# Patient Record
Sex: MALE | Race: WHITE | Employment: FULL TIME | URBAN - METROPOLITAN AREA
[De-identification: names, ages, dates, MRNs, and addresses within clinical notes are randomized per-mention and may not be internally consistent; named-entity substitution may affect disease eponyms.]

---

## 2017-02-28 RX ORDER — AMLODIPINE BESYLATE 10 MG/1
TABLET ORAL
Qty: 30 TABLET | Refills: 1 | Status: SHIPPED | OUTPATIENT
Start: 2017-02-28 | End: 2017-05-11 | Stop reason: SDUPTHER

## 2017-02-28 RX ORDER — LOSARTAN POTASSIUM 100 MG/1
TABLET ORAL
Qty: 30 TABLET | Refills: 1 | Status: SHIPPED | OUTPATIENT
Start: 2017-02-28 | End: 2017-05-11 | Stop reason: SDUPTHER

## 2017-02-28 RX ORDER — METOPROLOL SUCCINATE 50 MG/1
TABLET, EXTENDED RELEASE ORAL
Qty: 30 TABLET | Refills: 1 | Status: SHIPPED | OUTPATIENT
Start: 2017-02-28 | End: 2017-05-11 | Stop reason: SDUPTHER

## 2017-05-11 RX ORDER — METOPROLOL SUCCINATE 50 MG/1
TABLET, EXTENDED RELEASE ORAL
Qty: 30 TABLET | Refills: 0 | Status: SHIPPED | OUTPATIENT
Start: 2017-05-11 | End: 2017-05-19 | Stop reason: SDUPTHER

## 2017-05-11 RX ORDER — AMLODIPINE BESYLATE 10 MG/1
TABLET ORAL
Qty: 30 TABLET | Refills: 0 | Status: SHIPPED | OUTPATIENT
Start: 2017-05-11 | End: 2017-05-19 | Stop reason: SDUPTHER

## 2017-05-11 RX ORDER — LOSARTAN POTASSIUM 100 MG/1
TABLET ORAL
Qty: 30 TABLET | Refills: 0 | Status: SHIPPED | OUTPATIENT
Start: 2017-05-11 | End: 2017-05-19 | Stop reason: SDUPTHER

## 2017-05-19 ENCOUNTER — OFFICE VISIT (OUTPATIENT)
Dept: FAMILY MEDICINE CLINIC | Age: 57
End: 2017-05-19

## 2017-05-19 VITALS
RESPIRATION RATE: 16 BRPM | HEART RATE: 64 BPM | TEMPERATURE: 97.9 F | SYSTOLIC BLOOD PRESSURE: 124 MMHG | HEIGHT: 67 IN | WEIGHT: 185.6 LBS | DIASTOLIC BLOOD PRESSURE: 76 MMHG | BODY MASS INDEX: 29.13 KG/M2

## 2017-05-19 DIAGNOSIS — R60.0 LEG EDEMA, RIGHT: ICD-10-CM

## 2017-05-19 DIAGNOSIS — M79.604 RIGHT LEG PAIN: ICD-10-CM

## 2017-05-19 DIAGNOSIS — Z00.00 HEALTHCARE MAINTENANCE: Primary | ICD-10-CM

## 2017-05-19 DIAGNOSIS — I83.91 VARICOSE VEINS OF RIGHT LOWER EXTREMITY: ICD-10-CM

## 2017-05-19 DIAGNOSIS — E78.5 DYSLIPIDEMIA: ICD-10-CM

## 2017-05-19 DIAGNOSIS — I10 ESSENTIAL HYPERTENSION: ICD-10-CM

## 2017-05-19 PROCEDURE — 99396 PREV VISIT EST AGE 40-64: CPT | Performed by: FAMILY MEDICINE

## 2017-05-19 RX ORDER — METOPROLOL SUCCINATE 50 MG/1
TABLET, EXTENDED RELEASE ORAL
Qty: 30 TABLET | Refills: 5 | Status: SHIPPED | OUTPATIENT
Start: 2017-05-19 | End: 2017-12-24 | Stop reason: SDUPTHER

## 2017-05-19 RX ORDER — AMLODIPINE BESYLATE 10 MG/1
TABLET ORAL
Qty: 30 TABLET | Refills: 5 | Status: SHIPPED | OUTPATIENT
Start: 2017-05-19 | End: 2017-12-24 | Stop reason: SDUPTHER

## 2017-05-19 RX ORDER — LOSARTAN POTASSIUM 100 MG/1
TABLET ORAL
Qty: 30 TABLET | Refills: 5 | Status: SHIPPED | OUTPATIENT
Start: 2017-05-19 | End: 2017-12-24 | Stop reason: SDUPTHER

## 2017-05-22 DIAGNOSIS — M79.604 RIGHT LEG PAIN: ICD-10-CM

## 2017-05-22 DIAGNOSIS — R60.0 LEG EDEMA, RIGHT: ICD-10-CM

## 2017-06-09 DIAGNOSIS — I10 ESSENTIAL HYPERTENSION: ICD-10-CM

## 2017-06-09 DIAGNOSIS — E78.5 DYSLIPIDEMIA: ICD-10-CM

## 2017-06-09 LAB
ALBUMIN SERPL-MCNC: 4.3 G/DL (ref 3.9–4.9)
ALP BLD-CCNC: 86 U/L (ref 35–104)
ALT SERPL-CCNC: 24 U/L (ref 0–41)
ANION GAP SERPL CALCULATED.3IONS-SCNC: 14 MEQ/L (ref 7–13)
AST SERPL-CCNC: 22 U/L (ref 0–40)
BILIRUB SERPL-MCNC: 0.7 MG/DL (ref 0–1.2)
BUN BLDV-MCNC: 23 MG/DL (ref 6–20)
CALCIUM SERPL-MCNC: 9.1 MG/DL (ref 8.6–10.2)
CHLORIDE BLD-SCNC: 99 MEQ/L (ref 98–107)
CHOLESTEROL, TOTAL: 219 MG/DL (ref 0–199)
CO2: 26 MEQ/L (ref 22–29)
CREAT SERPL-MCNC: 0.74 MG/DL (ref 0.7–1.2)
GFR AFRICAN AMERICAN: >60
GFR NON-AFRICAN AMERICAN: >60
GLOBULIN: 2.7 G/DL (ref 2.3–3.5)
GLUCOSE BLD-MCNC: 83 MG/DL (ref 74–109)
HDLC SERPL-MCNC: 66 MG/DL (ref 40–59)
LDL CHOLESTEROL CALCULATED: 130 MG/DL (ref 0–129)
POTASSIUM SERPL-SCNC: 4.4 MEQ/L (ref 3.5–5.1)
SODIUM BLD-SCNC: 139 MEQ/L (ref 132–144)
TOTAL PROTEIN: 7 G/DL (ref 6.4–8.1)
TRIGL SERPL-MCNC: 117 MG/DL (ref 0–200)

## 2017-12-26 RX ORDER — AMLODIPINE BESYLATE 10 MG/1
TABLET ORAL
Qty: 30 TABLET | Refills: 0 | Status: SHIPPED | OUTPATIENT
Start: 2017-12-26 | End: 2018-01-23 | Stop reason: SDUPTHER

## 2017-12-26 RX ORDER — METOPROLOL SUCCINATE 50 MG/1
TABLET, EXTENDED RELEASE ORAL
Qty: 30 TABLET | Refills: 0 | Status: SHIPPED | OUTPATIENT
Start: 2017-12-26 | End: 2018-01-23 | Stop reason: SDUPTHER

## 2017-12-26 RX ORDER — LOSARTAN POTASSIUM 100 MG/1
TABLET ORAL
Qty: 30 TABLET | Refills: 0 | Status: SHIPPED | OUTPATIENT
Start: 2017-12-26 | End: 2018-01-23 | Stop reason: SDUPTHER

## 2017-12-26 NOTE — TELEPHONE ENCOUNTER
Treatment approved for 1 month   Called the patient. Needs follow up appointment.   Not seen in over 6 months

## 2017-12-26 NOTE — TELEPHONE ENCOUNTER
Pharmacy requests refill on medication. Please approve or deny this request.  Last seen by you on 5/19/2017. Pt needs follow up for HTN    No future appointments.

## 2018-01-21 RX ORDER — AMLODIPINE BESYLATE 10 MG/1
TABLET ORAL
Qty: 30 TABLET | OUTPATIENT
Start: 2018-01-21

## 2018-01-21 RX ORDER — METOPROLOL SUCCINATE 50 MG/1
TABLET, EXTENDED RELEASE ORAL
Qty: 30 TABLET | OUTPATIENT
Start: 2018-01-21

## 2018-01-21 RX ORDER — LOSARTAN POTASSIUM 100 MG/1
TABLET ORAL
Qty: 30 TABLET | OUTPATIENT
Start: 2018-01-21

## 2018-01-23 ENCOUNTER — OFFICE VISIT (OUTPATIENT)
Dept: FAMILY MEDICINE CLINIC | Age: 58
End: 2018-01-23
Payer: COMMERCIAL

## 2018-01-23 VITALS
HEIGHT: 67 IN | HEART RATE: 76 BPM | RESPIRATION RATE: 16 BRPM | DIASTOLIC BLOOD PRESSURE: 82 MMHG | TEMPERATURE: 98.7 F | WEIGHT: 192.4 LBS | BODY MASS INDEX: 30.2 KG/M2 | SYSTOLIC BLOOD PRESSURE: 122 MMHG

## 2018-01-23 DIAGNOSIS — I10 ESSENTIAL HYPERTENSION: Primary | ICD-10-CM

## 2018-01-23 DIAGNOSIS — E78.5 DYSLIPIDEMIA: ICD-10-CM

## 2018-01-23 PROCEDURE — 99213 OFFICE O/P EST LOW 20 MIN: CPT | Performed by: FAMILY MEDICINE

## 2018-01-23 RX ORDER — METOPROLOL SUCCINATE 50 MG/1
TABLET, EXTENDED RELEASE ORAL
Qty: 30 TABLET | Refills: 5 | Status: SHIPPED | OUTPATIENT
Start: 2018-01-23 | End: 2018-07-26 | Stop reason: SDUPTHER

## 2018-01-23 RX ORDER — LOSARTAN POTASSIUM 100 MG/1
TABLET ORAL
Qty: 30 TABLET | Refills: 5 | Status: SHIPPED | OUTPATIENT
Start: 2018-01-23 | End: 2018-07-26 | Stop reason: SDUPTHER

## 2018-01-23 RX ORDER — AMLODIPINE BESYLATE 10 MG/1
TABLET ORAL
Qty: 30 TABLET | Refills: 5 | Status: SHIPPED | OUTPATIENT
Start: 2018-01-23 | End: 2018-07-26 | Stop reason: SDUPTHER

## 2018-01-23 ASSESSMENT — PATIENT HEALTH QUESTIONNAIRE - PHQ9
2. FEELING DOWN, DEPRESSED OR HOPELESS: 0
SUM OF ALL RESPONSES TO PHQ9 QUESTIONS 1 & 2: 0
1. LITTLE INTEREST OR PLEASURE IN DOING THINGS: 0
SUM OF ALL RESPONSES TO PHQ QUESTIONS 1-9: 0

## 2018-01-23 NOTE — PROGRESS NOTES
palpable lymphadenopathy, no hepatosplenomegaly  Chest - clear to auscultation, no wheezes, rales or rhonchi, symmetric air entry  Heart - normal rate, regular rhythm, normal S1, S2, no murmurs, rubs, clicks or gallops  Abdomen - soft, nontender, nondistended, no masses or organomegaly  Neurological - alert, oriented, normal speech, no focal findings or movement disorder noted  Musculoskeletal - no joint tenderness, deformity or swelling. Assessment:     1. Essential hypertension  Comprehensive Metabolic Panel    Lipid Panel    CBC Auto Differential   2. Dyslipidemia            Plan:     Outpatient Encounter Prescriptions as of 1/23/2018   Medication Sig Dispense Refill    metoprolol succinate (TOPROL XL) 50 MG extended release tablet Take 1 tablet by mouth daily 30 tablet 5    amLODIPine (NORVASC) 10 MG tablet Take 1 tablet by mouth daily 30 tablet 5    losartan (COZAAR) 100 MG tablet Take 1 tablet by mouth daily 30 tablet 5    aspirin 81 MG tablet Take 81 mg by mouth daily      [DISCONTINUED] metoprolol succinate (TOPROL XL) 50 MG extended release tablet Take 1 tablet by mouth daily 30 tablet 0    [DISCONTINUED] losartan (COZAAR) 100 MG tablet Take 1 tablet by mouth daily 30 tablet 0    [DISCONTINUED] amLODIPine (NORVASC) 10 MG tablet Take 1 tablet by mouth daily 30 tablet 0     No facility-administered encounter medications on file as of 1/23/2018. Orders Placed This Encounter   Procedures    Comprehensive Metabolic Panel     Standing Status:   Future     Standing Expiration Date:   1/23/2019    Lipid Panel     Standing Status:   Future     Standing Expiration Date:   1/23/2019     Order Specific Question:   Is Patient Fasting?/# of Hours     Answer:   yes 9-12 hrs    CBC Auto Differential     Standing Status:   Future     Standing Expiration Date:   1/23/2019        Reviewed diet, exercise and weight control. Recommended sodium restriction.   Cardiovascular risk and specific lipid/LDL goals reviewed. Reviewed medications and side effects in detail. Return in about 6 months (around 7/23/2018).

## 2018-01-25 ENCOUNTER — TELEPHONE (OUTPATIENT)
Dept: OTHER | Facility: CLINIC | Age: 58
End: 2018-01-25

## 2018-03-13 ENCOUNTER — OFFICE VISIT (OUTPATIENT)
Dept: FAMILY MEDICINE CLINIC | Age: 58
End: 2018-03-13
Payer: COMMERCIAL

## 2018-03-13 ENCOUNTER — TELEPHONE (OUTPATIENT)
Dept: FAMILY MEDICINE CLINIC | Age: 58
End: 2018-03-13

## 2018-03-13 VITALS
HEIGHT: 67 IN | WEIGHT: 195.4 LBS | RESPIRATION RATE: 16 BRPM | BODY MASS INDEX: 30.67 KG/M2 | HEART RATE: 87 BPM | OXYGEN SATURATION: 95 % | TEMPERATURE: 100.5 F | DIASTOLIC BLOOD PRESSURE: 78 MMHG | SYSTOLIC BLOOD PRESSURE: 130 MMHG

## 2018-03-13 DIAGNOSIS — R06.2 WHEEZES: ICD-10-CM

## 2018-03-13 DIAGNOSIS — J11.1 INFLUENZA: Primary | ICD-10-CM

## 2018-03-13 DIAGNOSIS — R09.89 RHONCHI: ICD-10-CM

## 2018-03-13 DIAGNOSIS — R06.02 SHORTNESS OF BREATH: ICD-10-CM

## 2018-03-13 DIAGNOSIS — R05.9 COUGH: ICD-10-CM

## 2018-03-13 DIAGNOSIS — R68.89 FLU-LIKE SYMPTOMS: ICD-10-CM

## 2018-03-13 LAB
INFLUENZA A ANTIBODY: NORMAL
INFLUENZA B ANTIBODY: NORMAL

## 2018-03-13 PROCEDURE — 87804 INFLUENZA ASSAY W/OPTIC: CPT | Performed by: NURSE PRACTITIONER

## 2018-03-13 PROCEDURE — 99213 OFFICE O/P EST LOW 20 MIN: CPT | Performed by: NURSE PRACTITIONER

## 2018-03-13 PROCEDURE — 94640 AIRWAY INHALATION TREATMENT: CPT | Performed by: NURSE PRACTITIONER

## 2018-03-13 RX ORDER — ALBUTEROL SULFATE 2.5 MG/3ML
2.5 SOLUTION RESPIRATORY (INHALATION) ONCE
Status: COMPLETED | OUTPATIENT
Start: 2018-03-13 | End: 2018-03-13

## 2018-03-13 RX ORDER — DEXTROMETHORPHAN HYDROBROMIDE AND PROMETHAZINE HYDROCHLORIDE 15; 6.25 MG/5ML; MG/5ML
5 SYRUP ORAL 4 TIMES DAILY PRN
Qty: 150 ML | Refills: 0 | Status: SHIPPED | OUTPATIENT
Start: 2018-03-13 | End: 2018-03-20

## 2018-03-13 RX ORDER — ALBUTEROL SULFATE 90 UG/1
2 AEROSOL, METERED RESPIRATORY (INHALATION) EVERY 6 HOURS PRN
Qty: 1 INHALER | Refills: 0 | Status: SHIPPED | OUTPATIENT
Start: 2018-03-13 | End: 2018-07-26 | Stop reason: ALTCHOICE

## 2018-03-13 RX ORDER — ALBUTEROL SULFATE 90 UG/1
2 AEROSOL, METERED RESPIRATORY (INHALATION) EVERY 4 HOURS PRN
Qty: 1 INHALER | Refills: 0 | Status: SHIPPED | OUTPATIENT
Start: 2018-03-13 | End: 2018-03-13

## 2018-03-13 RX ORDER — GUAIFENESIN 600 MG/1
1200 TABLET, EXTENDED RELEASE ORAL 2 TIMES DAILY
Qty: 28 TABLET | Refills: 0 | Status: SHIPPED | OUTPATIENT
Start: 2018-03-13 | End: 2018-03-20

## 2018-03-13 RX ORDER — AZITHROMYCIN 250 MG/1
TABLET, FILM COATED ORAL
Qty: 1 PACKET | Refills: 0 | Status: SHIPPED | OUTPATIENT
Start: 2018-03-13 | End: 2018-07-26 | Stop reason: ALTCHOICE

## 2018-03-13 RX ORDER — PREDNISONE 10 MG/1
TABLET ORAL
Qty: 20 TABLET | Refills: 0 | Status: SHIPPED | OUTPATIENT
Start: 2018-03-13 | End: 2018-07-26 | Stop reason: ALTCHOICE

## 2018-03-13 RX ADMIN — ALBUTEROL SULFATE 2.5 MG: 2.5 SOLUTION RESPIRATORY (INHALATION) at 15:24

## 2018-03-13 ASSESSMENT — ENCOUNTER SYMPTOMS
DIARRHEA: 0
NAUSEA: 0
SINUS PAIN: 1
SORE THROAT: 1
WHEEZING: 0
CONSTIPATION: 0
EYE DISCHARGE: 0
SHORTNESS OF BREATH: 0
COUGH: 1
VOMITING: 0
FLU SYMPTOMS: 1
ABDOMINAL PAIN: 0
SINUS PRESSURE: 1

## 2018-03-13 NOTE — PROGRESS NOTES
facility-administered medications for this visit. Review of Systems   Constitutional: Positive for chills, fatigue and fever. HENT: Positive for congestion, sinus pain, sinus pressure, sneezing and sore throat (dry scratchy). Negative for ear pain. Eyes: Negative for discharge. Respiratory: Positive for cough. Negative for shortness of breath and wheezing. Cardiovascular: Negative for chest pain. Gastrointestinal: Positive for anorexia (mild). Negative for abdominal pain, constipation, diarrhea, nausea and vomiting. Genitourinary: Negative for decreased urine volume and difficulty urinating. Musculoskeletal: Positive for myalgias. Skin: Negative for rash. Allergic/Immunologic: Positive for environmental allergies. Neurological: Positive for headaches. Negative for dizziness and light-headedness. PMH, Surgical Hx, Family Hx, and Social Hx reviewed and updated. Health Maintenance reviewed. Objective    Vitals:    03/13/18 1444   BP: 130/78   Site: Left Arm   Position: Sitting   Cuff Size: Large Adult   Pulse: 87   Resp: 16   Temp: 100.5 °F (38.1 °C)   TempSrc: Temporal   SpO2: 95%   Weight: 195 lb 6.4 oz (88.6 kg)   Height: 5' 7\" (1.702 m)       Physical Exam   Constitutional: He is oriented to person, place, and time. Vital signs are normal. He appears well-developed and well-nourished. HENT:   Head: Normocephalic and atraumatic. Right Ear: Hearing, tympanic membrane, external ear and ear canal normal.   Left Ear: Hearing, tympanic membrane, external ear and ear canal normal.   Nose: Rhinorrhea present. No mucosal edema or sinus tenderness. Right sinus exhibits no maxillary sinus tenderness and no frontal sinus tenderness. Left sinus exhibits no maxillary sinus tenderness and no frontal sinus tenderness. Mouth/Throat: Mucous membranes are normal. Posterior oropharyngeal erythema (mild) present. No oropharyngeal exudate or posterior oropharyngeal edema.    Eyes: Conjunctivae

## 2018-03-13 NOTE — PATIENT INSTRUCTIONS
Chest xray  Zpak as ordered  Albuterol inhaler as ordered  Prednisone taper  Cough syrup at night or when you will not be driving as it may make you sleepy  Rest  Increase fluids: especially water  Warm salt water gargles: 1 tsp to 1 cup warm water to help soothe sore and scratchy throat   Warm or cool beverages/soups will also help soothe throat and help break up secretions. Warm decaffeinated tea with honey, brothy soups, sherbet, popsicles  May suck on cough drops or hard candies to help keep throat lubricated  Cool mist humidifier  Tylenol or Ibuprofen for pain/fever  Saline nasal spray for sinus rinses: may use up to 3 times a day  Expel nasal secretions frequently  You may take Mucinex plain with plenty of water. Cough up and expel mucus. Frequent hand washing    Note provided for work    Patient Education        Influenza (Flu): Care Instructions  Your Care Instructions    Influenza (flu) is an infection in the lungs and breathing passages. It is caused by the influenza virus. There are different strains, or types, of the flu virus from year to year. Unlike the common cold, the flu comes on suddenly and the symptoms, such as a cough, congestion, fever, chills, fatigue, aches, and pains, are more severe. These symptoms may last up to 10 days. Although the flu can make you feel very sick, it usually doesn't cause serious health problems. Home treatment is usually all you need for flu symptoms. But your doctor may prescribe antiviral medicine to prevent other health problems, such as pneumonia, from developing. Older people and those who have a long-term health condition, such as lung disease, are most at risk for having pneumonia or other health problems. Follow-up care is a key part of your treatment and safety. Be sure to make and go to all appointments, and call your doctor if you are having problems. It's also a good idea to know your test results and keep a list of the medicines you take.   How can of medicine. · Ask people living with you to talk to their doctors about preventing the flu. They may get antiviral medicine to keep from getting the flu from you. · To prevent the flu in the future, get a flu vaccine every fall. Encourage people living with you to get the vaccine. · Cover your mouth when you cough or sneeze. When should you call for help? Call 911 anytime you think you may need emergency care. For example, call if:  ? · You have severe trouble breathing. ?Call your doctor now or seek immediate medical care if:  ? · You have new or worse trouble breathing. ? · You seem to be getting much sicker. ? · You feel very sleepy or confused. ? · You have a new or higher fever. ? · You get a new rash. ? Watch closely for changes in your health, and be sure to contact your doctor if:  ? · You begin to get better and then get worse. ? · You are not getting better after 1 week. Where can you learn more? Go to https://[employer]ch. org and sign in to your [employer] account. Enter G213 in the CoContest box to learn more about \"Influenza (Flu): Care Instructions. \"     If you do not have an account, please click on the \"Sign Up Now\" link. Current as of: May 12, 2017  Content Version: 11.5  © 0735-8233 [employer]wise, Incorporated. Care instructions adapted under license by [facility] ([facility]). If you have questions about a medical condition or this instruction, always ask your healthcare professional. [place] [address] any warranty or liability for your use of this information. Patient Education        Saline Nasal Washes: Care Instructions  Your Care Instructions  Saline nasal washes help keep the nasal passages open by washing out thick or dried mucus. This simple remedy can help relieve symptoms of allergies, sinusitis, and colds.  It also can make the nose feel more comfortable by keeping the mucous membranes moist. You may notice a little burning

## 2018-03-14 ENCOUNTER — HOSPITAL ENCOUNTER (OUTPATIENT)
Dept: GENERAL RADIOLOGY | Age: 58
Discharge: HOME | End: 2018-03-16
Payer: COMMERCIAL

## 2018-03-14 ENCOUNTER — HOSPITAL ENCOUNTER (OUTPATIENT)
Age: 58
Discharge: HOME | End: 2018-03-16
Payer: COMMERCIAL

## 2018-03-14 DIAGNOSIS — R06.2 WHEEZES: ICD-10-CM

## 2018-03-14 DIAGNOSIS — R06.02 SHORTNESS OF BREATH: ICD-10-CM

## 2018-03-14 DIAGNOSIS — R05.9 COUGH: ICD-10-CM

## 2018-03-14 DIAGNOSIS — R09.89 RHONCHI: ICD-10-CM

## 2018-03-14 PROCEDURE — 71046 X-RAY EXAM CHEST 2 VIEWS: CPT

## 2018-03-18 ENCOUNTER — HOSPITAL ENCOUNTER (EMERGENCY)
Age: 58
Discharge: HOME | End: 2018-03-18
Attending: EMERGENCY MEDICINE
Payer: COMMERCIAL

## 2018-03-18 VITALS
SYSTOLIC BLOOD PRESSURE: 136 MMHG | HEIGHT: 67 IN | TEMPERATURE: 98.5 F | BODY MASS INDEX: 29.03 KG/M2 | WEIGHT: 185 LBS | HEART RATE: 70 BPM | OXYGEN SATURATION: 95 % | DIASTOLIC BLOOD PRESSURE: 87 MMHG | RESPIRATION RATE: 16 BRPM

## 2018-03-18 DIAGNOSIS — I10 HYPERTENSION, UNSPECIFIED TYPE: Primary | ICD-10-CM

## 2018-03-18 PROCEDURE — 99282 EMERGENCY DEPT VISIT SF MDM: CPT

## 2018-03-18 PROCEDURE — 96374 THER/PROPH/DIAG INJ IV PUSH: CPT

## 2018-03-18 PROCEDURE — 2500000003 HC RX 250 WO HCPCS: Performed by: EMERGENCY MEDICINE

## 2018-03-18 RX ORDER — METOPROLOL TARTRATE 5 MG/5ML
5 INJECTION INTRAVENOUS ONCE
Status: COMPLETED | OUTPATIENT
Start: 2018-03-18 | End: 2018-03-18

## 2018-03-18 RX ADMIN — METOPROLOL TARTRATE 5 MG: 5 INJECTION, SOLUTION INTRAVENOUS at 15:34

## 2018-03-18 ASSESSMENT — ENCOUNTER SYMPTOMS
VOMITING: 0
COUGH: 1
WHEEZING: 1

## 2018-03-18 NOTE — ED NOTES
Explained discharge instructions to patient. Went over discharge diagnosis and pertinent educational material with patient. Patient stated understanding of discharge diagnosis, instructions, and follow up. Patient denies any questions at this time. No signs or symptoms of pain or distress noted at this time. Patient discharged to home with spouse. Denies needs at time of discharge, reasons to return to ER reviewed. A/0 x3, ambulatory, respirations even and unlabored on room air.       Kenzie Reina RN  03/18/18 2933

## 2018-03-18 NOTE — ED PROVIDER NOTES
[facility] ED  eMERGENCY dEPARTMENT eNCOUnter      Pt Name: Sherin Hopson  MRN: [id number]  [place] 1960  Date of evaluation: 3/18/2018  Provider: Sb Loya MD    [address] [place] [address]       Chief Complaint   Patient presents with    Hypertension         HISTORY OF PRESENT ILLNESS   (Location/Symptom, Timing/Onset, Context/Setting, Quality, Duration, Modifying Factors, Severity)  Note limiting factors. Sherin Hopson is a 56 y.o. male who presents to the emergency department With feeling hazy are slightly confused and having some muffling of his hearing. He states he has felt this way previously when his blood pressure was elevated. Patient is treated for respiratory infection including steroids multiple cough and and other medications including inhaler. He states prior to all his medications his blood pressure been well controlled in the 130/70 range during most recent office visit. No headache. No visual change. His respirations are symptoms are improving but not completely improved. No chest pain or palpitations. No syncope. No dizziness or weakness. REVIEW OF SYSTEMS    (2-9 systems for level 4, 10 or more for level 5)     Review of Systems   Constitutional: Negative for fever. HENT: Positive for congestion. Eyes: Negative for visual disturbance. Respiratory: Positive for cough and wheezing. Cardiovascular: Negative for chest pain. Gastrointestinal: Negative for vomiting. Skin: Negative for rash. Neurological: Negative for weakness, numbness and headaches. Psychiatric/Behavioral: The patient is nervous/anxious. Except as noted above the remainder of the review of systems was reviewed and negative. PAST MEDICAL HISTORY     Past Medical History:   Diagnosis Date    Anxiety     Osteoarthritis          SURGICAL HISTORY       Past Surgical History:   Procedure Laterality Date    CERVICAL One [address] SURGERY  4/23/13    DR. PIZARRO     COLONOSCOPY  6/13/14

## 2018-07-17 ENCOUNTER — HOSPITAL ENCOUNTER (OUTPATIENT)
Dept: LAB | Age: 58
Discharge: HOME | End: 2018-07-17
Payer: COMMERCIAL

## 2018-07-17 DIAGNOSIS — I10 ESSENTIAL HYPERTENSION: ICD-10-CM

## 2018-07-17 LAB
ALBUMIN SERPL-MCNC: 4.1 G/DL (ref 3.9–4.9)
ALP BLD-CCNC: 80 U/L (ref 35–104)
ALT SERPL-CCNC: 24 U/L (ref 0–41)
ANION GAP SERPL CALCULATED.3IONS-SCNC: 14 MEQ/L (ref 7–13)
AST SERPL-CCNC: 27 U/L (ref 0–40)
BASOPHILS ABSOLUTE: 0.1 K/UL (ref 0–0.2)
BASOPHILS RELATIVE PERCENT: 1.1 %
BILIRUB SERPL-MCNC: 0.7 MG/DL (ref 0–1.2)
BUN BLDV-MCNC: 20 MG/DL (ref 6–20)
CALCIUM SERPL-MCNC: 9 MG/DL (ref 8.6–10.2)
CHLORIDE BLD-SCNC: 103 MEQ/L (ref 98–107)
CHOLESTEROL, TOTAL: 225 MG/DL (ref 0–199)
CO2: 24 MEQ/L (ref 22–29)
CREAT SERPL-MCNC: 0.61 MG/DL (ref 0.7–1.2)
EOSINOPHILS ABSOLUTE: 0.3 K/UL (ref 0–0.7)
EOSINOPHILS RELATIVE PERCENT: 5 %
GFR AFRICAN AMERICAN: >60
GFR NON-AFRICAN AMERICAN: >60
GLOBULIN: 3 G/DL (ref 2.3–3.5)
GLUCOSE BLD-MCNC: 86 MG/DL (ref 74–109)
HCT VFR BLD CALC: 48 % (ref 42–52)
HDLC SERPL-MCNC: 61 MG/DL (ref 40–59)
HEMOGLOBIN: 16 G/DL (ref 14–18)
LDL CHOLESTEROL CALCULATED: 142 MG/DL (ref 0–129)
LYMPHOCYTES ABSOLUTE: 1.8 K/UL (ref 1–4.8)
LYMPHOCYTES RELATIVE PERCENT: 26.6 %
MCH RBC QN AUTO: 31 PG (ref 27–31.3)
MCHC RBC AUTO-ENTMCNC: 33.3 % (ref 33–37)
MCV RBC AUTO: 93.3 FL (ref 80–100)
MONOCYTES ABSOLUTE: 0.8 K/UL (ref 0.2–0.8)
MONOCYTES RELATIVE PERCENT: 12.3 %
NEUTROPHILS ABSOLUTE: 3.7 K/UL (ref 1.4–6.5)
NEUTROPHILS RELATIVE PERCENT: 55 %
PDW BLD-RTO: 13.7 % (ref 11.5–14.5)
PLATELET # BLD: 255 K/UL (ref 130–400)
POTASSIUM SERPL-SCNC: 4.7 MEQ/L (ref 3.5–5.1)
RBC # BLD: 5.15 M/UL (ref 4.7–6.1)
SODIUM BLD-SCNC: 141 MEQ/L (ref 132–144)
TOTAL PROTEIN: 7.1 G/DL (ref 6.4–8.1)
TRIGL SERPL-MCNC: 111 MG/DL (ref 0–200)
WBC # BLD: 6.7 K/UL (ref 4.8–10.8)

## 2018-07-17 PROCEDURE — 80053 COMPREHEN METABOLIC PANEL: CPT

## 2018-07-17 PROCEDURE — 36415 COLL VENOUS BLD VENIPUNCTURE: CPT

## 2018-07-17 PROCEDURE — 85025 COMPLETE CBC W/AUTO DIFF WBC: CPT

## 2018-07-17 PROCEDURE — 80061 LIPID PANEL: CPT

## 2018-07-26 ENCOUNTER — OFFICE VISIT (OUTPATIENT)
Dept: FAMILY MEDICINE CLINIC | Age: 58
End: 2018-07-26
Payer: COMMERCIAL

## 2018-07-26 VITALS
WEIGHT: 188.2 LBS | SYSTOLIC BLOOD PRESSURE: 128 MMHG | HEIGHT: 67 IN | HEART RATE: 68 BPM | RESPIRATION RATE: 16 BRPM | DIASTOLIC BLOOD PRESSURE: 70 MMHG | BODY MASS INDEX: 29.54 KG/M2 | TEMPERATURE: 98.3 F

## 2018-07-26 DIAGNOSIS — R68.81 EARLY SATIETY: ICD-10-CM

## 2018-07-26 DIAGNOSIS — R10.13 DYSPEPSIA: ICD-10-CM

## 2018-07-26 DIAGNOSIS — I10 ESSENTIAL HYPERTENSION: Primary | ICD-10-CM

## 2018-07-26 DIAGNOSIS — E78.5 DYSLIPIDEMIA: ICD-10-CM

## 2018-07-26 PROCEDURE — 99214 OFFICE O/P EST MOD 30 MIN: CPT | Performed by: FAMILY MEDICINE

## 2018-07-26 RX ORDER — METOPROLOL SUCCINATE 50 MG/1
TABLET, EXTENDED RELEASE ORAL
Qty: 30 TABLET | Refills: 5 | Status: SHIPPED | OUTPATIENT
Start: 2018-07-26 | End: 2019-03-02 | Stop reason: SDUPTHER

## 2018-07-26 RX ORDER — LOSARTAN POTASSIUM 100 MG/1
TABLET ORAL
Qty: 30 TABLET | Refills: 5 | Status: SHIPPED | OUTPATIENT
Start: 2018-07-26 | End: 2019-03-02 | Stop reason: SDUPTHER

## 2018-07-26 RX ORDER — AMLODIPINE BESYLATE 10 MG/1
TABLET ORAL
Qty: 30 TABLET | Refills: 5 | Status: SHIPPED | OUTPATIENT
Start: 2018-07-26 | End: 2019-03-02 | Stop reason: SDUPTHER

## 2018-07-26 ASSESSMENT — PATIENT HEALTH QUESTIONNAIRE - PHQ9
SUM OF ALL RESPONSES TO PHQ QUESTIONS 1-9: 0
SUM OF ALL RESPONSES TO PHQ9 QUESTIONS 1 & 2: 0
2. FEELING DOWN, DEPRESSED OR HOPELESS: 0
1. LITTLE INTEREST OR PLEASURE IN DOING THINGS: 0

## 2018-07-26 NOTE — PROGRESS NOTES
Subjective:     Chief Complaint   Patient presents with    6 Month Follow-Up     htn, dyslipidemia and labs         Susana Guadalupe is a 56 y.o. male who presents for follow up on hypertension,  Hyperlipidemia, and other medical conditions noted below. He indicates that he is feeling well and denies any symptoms referable to his elevated blood pressure. Specifically denies chest pain, palpitations, dyspnea, orthopnea, PND or peripheral edema. No anorexia, arthralgia, or leg cramps noted. Current medication regimen is as listed below. He denies any side effects of medication, and has been taking it regularly. He presents today with dyspepsia and feeling of fullness after eating small meals. He also complains of early satiety. He has occasional epigastric pain and discomfort with eating. No nausea no vomiting. No heartburn. He denies dysphagia and no odynophagia. He has no constipation and no diarrhea. No weight loss. He has no hematochezia etc. no hematemesis. Patient Active Problem List   Diagnosis    Osteoarthritis    Anxiety    HTN (hypertension)    Dyslipidemia    Drug-induced erectile dysfunction    S/P lumbar microdiscectomy    Herniated lumbar intervertebral disc       Current Outpatient Prescriptions   Medication Sig Dispense Refill    metoprolol succinate (TOPROL XL) 50 MG extended release tablet Take 1 tablet by mouth daily 30 tablet 5    amLODIPine (NORVASC) 10 MG tablet Take 1 tablet by mouth daily 30 tablet 5    losartan (COZAAR) 100 MG tablet Take 1 tablet by mouth daily 30 tablet 5    aspirin 81 MG tablet Take 81 mg by mouth daily       No current facility-administered medications for this visit.         Allergies   Allergen Reactions    Lisinopril      cough    Morphine        Social History   Substance Use Topics    Smoking status: Current Every Day Smoker     Packs/day: 0.10     Years: 18.00     Types: Cigars     Start date: 2000    Smokeless tobacco: Never Used significant adenopathy  Lymphatics - no palpable lymphadenopathy, no hepatosplenomegaly  Chest - clear to auscultation, no wheezes, rales or rhonchi, symmetric air entry  Heart - normal rate, regular rhythm, normal S1, S2, no murmurs, rubs, clicks or gallops  Abdomen - soft, nontender, nondistended, no masses or organomegaly  Neurological - alert, oriented, normal speech, no focal findings or movement disorder noted  Musculoskeletal - no joint tenderness, deformity or swelling        Hospital Outpatient Visit on 07/17/2018   Component Date Value Ref Range Status    Sodium 07/17/2018 141  132 - 144 mEq/L Final    Potassium 07/17/2018 4.7  3.5 - 5.1 mEq/L Final    Chloride 07/17/2018 103  98 - 107 mEq/L Final    CO2 07/17/2018 24  22 - 29 mEq/L Final    Anion Gap 07/17/2018 14* 7 - 13 mEq/L Final    Glucose 07/17/2018 86  74 - 109 mg/dL Final    BUN 07/17/2018 20  6 - 20 mg/dL Final    CREATININE 07/17/2018 0.61* 0.70 - 1.20 mg/dL Final    GFR Non- 07/17/2018 >60.0  >60 Final    Comment: >60 mL/min/1.73m2 EGFR, calc. for ages 25 and older using the  MDRD formula (not corrected for weight), is valid for stable  renal function.  GFR  07/17/2018 >60.0  >60 Final    Comment: >60 mL/min/1.73m2 EGFR, calc. for ages 25 and older using the  MDRD formula (not corrected for weight), is valid for stable  renal function.  Calcium 07/17/2018 9.0  8.6 - 10.2 mg/dL Final    Total Protein 07/17/2018 7.1  6.4 - 8.1 g/dL Final    Alb 07/17/2018 4.1  3.9 - 4.9 g/dL Final    Total Bilirubin 07/17/2018 0.7  0.0 - 1.2 mg/dL Final    Alkaline Phosphatase 07/17/2018 80  35 - 104 U/L Final    ALT 07/17/2018 24  0 - 41 U/L Final    AST 07/17/2018 27  0 - 40 U/L Final    Globulin 07/17/2018 3.0  2.3 - 3.5 g/dL Final    Cholesterol, Total 07/17/2018 225* 0 - 199 mg/dL Final    ATP III Cholesterol Classification is Borderline High.     Triglycerides 07/17/2018 111  0 - 200 mg/dL Final Visits Requested:   1     Outpatient Encounter Prescriptions as of 7/26/2018   Medication Sig Dispense Refill    metoprolol succinate (TOPROL XL) 50 MG extended release tablet Take 1 tablet by mouth daily 30 tablet 5    amLODIPine (NORVASC) 10 MG tablet Take 1 tablet by mouth daily 30 tablet 5    losartan (COZAAR) 100 MG tablet Take 1 tablet by mouth daily 30 tablet 5    aspirin 81 MG tablet Take 81 mg by mouth daily      [DISCONTINUED] azithromycin (ZITHROMAX) 250 MG tablet Take 2 tabs (500 mg) on Day 1, and take 1 tab (250 mg) on days 2 through 5. 1 packet 0    [DISCONTINUED] predniSONE (DELTASONE) 10 MG tablet Take 3 tabs for 3 days, then 2 tabs for 3 days, then 1 tab for 3 days, then 1/2 tab for 3 days, then stop. 20 tablet 0    [DISCONTINUED] albuterol sulfate  (90 Base) MCG/ACT inhaler Inhale 2 puffs into the lungs every 6 hours as needed for Wheezing 1 Inhaler 0    [DISCONTINUED] metoprolol succinate (TOPROL XL) 50 MG extended release tablet Take 1 tablet by mouth daily 30 tablet 5    [DISCONTINUED] amLODIPine (NORVASC) 10 MG tablet Take 1 tablet by mouth daily 30 tablet 5    [DISCONTINUED] losartan (COZAAR) 100 MG tablet Take 1 tablet by mouth daily 30 tablet 5     No facility-administered encounter medications on file as of 7/26/2018. The nature of cardiac risk has been fully discussed with this patient. I have made him aware of his LDL target goal given his cardiovascular risk analysis. I have discussed the appropriate diet. The need for lifelong compliance in order to reduce risk is stressed. A regular exercise program is recommended to help achieve and maintain normal body weight, fitness and improve lipid balance. A written copy of a low fat, low cholesterol diet has been given to the patient. Return in about 6 months (around 1/26/2019).

## 2018-09-11 ENCOUNTER — OFFICE VISIT (OUTPATIENT)
Dept: FAMILY MEDICINE CLINIC | Age: 58
End: 2018-09-11
Payer: COMMERCIAL

## 2018-09-11 ENCOUNTER — HOSPITAL ENCOUNTER (OUTPATIENT)
Age: 58
Discharge: HOME | End: 2018-09-13
Payer: COMMERCIAL

## 2018-09-11 ENCOUNTER — HOSPITAL ENCOUNTER (OUTPATIENT)
Dept: ULTRASOUND IMAGING | Age: 58
Discharge: HOME | End: 2018-09-13
Payer: COMMERCIAL

## 2018-09-11 ENCOUNTER — HOSPITAL ENCOUNTER (OUTPATIENT)
Dept: GENERAL RADIOLOGY | Age: 58
Discharge: HOME | End: 2018-09-13
Payer: COMMERCIAL

## 2018-09-11 VITALS
DIASTOLIC BLOOD PRESSURE: 76 MMHG | SYSTOLIC BLOOD PRESSURE: 116 MMHG | OXYGEN SATURATION: 99 % | TEMPERATURE: 97.9 F | RESPIRATION RATE: 16 BRPM | HEIGHT: 67 IN | BODY MASS INDEX: 29.51 KG/M2 | HEART RATE: 70 BPM | WEIGHT: 188 LBS

## 2018-09-11 DIAGNOSIS — M79.89 PAIN AND SWELLING OF LEFT LOWER LEG: Primary | ICD-10-CM

## 2018-09-11 DIAGNOSIS — M79.662 PAIN AND SWELLING OF LEFT LOWER LEG: ICD-10-CM

## 2018-09-11 DIAGNOSIS — M79.662 PAIN AND SWELLING OF LEFT LOWER LEG: Primary | ICD-10-CM

## 2018-09-11 DIAGNOSIS — M79.89 PAIN AND SWELLING OF LEFT LOWER LEG: ICD-10-CM

## 2018-09-11 PROCEDURE — 99214 OFFICE O/P EST MOD 30 MIN: CPT | Performed by: FAMILY MEDICINE

## 2018-09-11 PROCEDURE — 93971 EXTREMITY STUDY: CPT

## 2018-09-11 PROCEDURE — 73562 X-RAY EXAM OF KNEE 3: CPT

## 2018-09-11 RX ORDER — MELOXICAM 15 MG/1
15 TABLET ORAL DAILY
Qty: 30 TABLET | Refills: 3 | Status: SHIPPED | OUTPATIENT
Start: 2018-09-11 | End: 2019-07-23 | Stop reason: ALTCHOICE

## 2018-09-11 NOTE — PROGRESS NOTES
Subjective:      Patient ID: Lucia Wong is a 66 y.o. male who presents today for:  Chief Complaint   Patient presents with   Manuel Valadez     previous PCP Dr. Vicky Portillo. patient's wife wants him to [facility] providers.  Joint Swelling     x 1-2 weeks. complains left knee swelling, pain. denies any injury to knee. patient has tried Naproxen and Tylenol. stopped taking medication 3 days ago. states medication has provided mild relief of pain. has also tried Ace bandage while at work. after work tries ICE and elavation.  Health Maintenance     patient refuses Flu Vaccination. HPI  Patient is a 51-year-old male presents today to discuss left knee and leg swelling. He states that he noticed it a couple of days after using a leg press machine while at [employer]. He denied any pain at that time and states that the swelling was insidious in onset. He denies any chest pain or shortness of breath states that the swelling around knee and upper calf prevent him from bending leg completely. Denies any instability or locking or popping of the  Past Medical History:   Diagnosis Date    Anxiety     Osteoarthritis      Past Surgical History:   Procedure Laterality Date    CERVICAL One [address] SURGERY  4/23/13    DR. PIZARRO     COLONOSCOPY  6/13/14     [address] [facility] [address] REPAIR  2004    SPINE SURGERY  06/30/2016    lumbar    TOTAL HIP ARTHROPLASTY Right 12/2009     Family History   Problem Relation Age of Onset    COPD Mother     Stroke Father     High Blood Pressure Brother      Social History     Social History    Marital status:      Spouse name: N/A    Number of children: N/A    Years of education: N/A     Occupational History    Not on file.      Social History Main Topics    Smoking status: Current Every Day Smoker     Packs/day: 0.10     Years: 18.00     Types: Cigars     Start date: 2000    Smokeless tobacco: Never Used      Comment: 1-2 per day    Alcohol use 0.0 oz/week      Comment: 21 beers a week, 2 shots of liquor a week     Drug use: No    Sexual activity: Not on file     Other Topics Concern    Not on file     Social History Narrative    No narrative on file     Current Outpatient Prescriptions on File Prior to Visit   Medication Sig Dispense Refill    metoprolol succinate (TOPROL XL) 50 MG extended release tablet Take 1 tablet by mouth daily 30 tablet 5    amLODIPine (NORVASC) 10 MG tablet Take 1 tablet by mouth daily 30 tablet 5    losartan (COZAAR) 100 MG tablet Take 1 tablet by mouth daily 30 tablet 5    aspirin 81 MG tablet Take 81 mg by mouth daily       No current facility-administered medications on file prior to visit. Allergies:  Lisinopril and Morphine    Review of Systems   Constitutional: Negative for activity change, appetite change and fatigue. Respiratory: Negative for apnea, cough, chest tightness and shortness of breath. Cardiovascular: Negative for chest pain, palpitations and leg swelling. Gastrointestinal: Negative for abdominal pain, blood in stool, constipation, diarrhea, nausea and vomiting. Musculoskeletal: Positive for arthralgias, gait problem and joint swelling. Neurological: Negative for seizures and headaches. Psychiatric/Behavioral: Negative for hallucinations and suicidal ideas. Objective:   /76 (Site: Left Upper Arm, Position: Sitting, Cuff Size: Medium Adult)   Pulse 70   Temp 97.9 °F (36.6 °C) (Temporal)   Resp 16   Ht 5' 7\" (1.702 m)   Wt 188 lb (85.3 kg)   SpO2 99%   BMI 29.44 kg/m²     Physical Exam   Constitutional: He is oriented to person, place, and time. He appears well-developed and well-nourished. No distress. HENT:   Head: Normocephalic and atraumatic. Eyes: Pupils are equal, round, and reactive to light. Conjunctivae and EOM are normal.   Neck: Normal range of motion. Cardiovascular: Normal rate, regular rhythm and normal heart sounds. Exam reveals no gallop and no friction rub.     No murmur heard.  Pulmonary/Chest: Effort normal and breath sounds normal. No respiratory distress. He has no wheezes. He has no rales. He exhibits no tenderness. Musculoskeletal:        Left knee: He exhibits decreased range of motion, swelling and effusion. He exhibits no ecchymosis, no laceration, no erythema, normal alignment, no LCL laxity, no bony tenderness and normal meniscus. Tenderness found. Medial joint line tenderness noted. No lateral joint line, no MCL, no LCL and no patellar tendon tenderness noted. Neurological: He is alert and oriented to person, place, and time. Skin: Skin is warm and dry. He is not diaphoretic. Psychiatric: He has a normal mood and affect. His behavior is normal. Judgment and thought content normal.   Nursing note and vitals reviewed. The 10-year ASCVD risk score (Chacha Porter, et al., 2013) is: 10.4%    Values used to calculate the score:      Age: 66 years      Sex: Male      Is Non- : No      Diabetic: No      Tobacco smoker: Yes      Systolic Blood Pressure: 068 mmHg      Is BP treated: Yes      HDL Cholesterol: 61 mg/dL      Total Cholesterol: 225 mg/dL    Assessment & Plan:      1. Pain and swelling of left lower leg  Due to the swelling posterior to the knee into the calf will need to obtain an ultrasound to rule out DVT although less likely  Anti-inflammatory and range of motion exercises for 2 weeks and follow up for signs for improvement if no improvement at that time we will need to obtain an MRI  - XR KNEE LEFT (3 VIEWS); Future  - meloxicam (MOBIC) 15 MG tablet; Take 1 tablet by mouth daily  Dispense: 30 tablet; Refill: 3  - US Duplex Lower Extremity Left Giovani; Future      No Follow-up on file.     José Antonio Donovan MD

## 2018-09-16 ASSESSMENT — ENCOUNTER SYMPTOMS
CHEST TIGHTNESS: 0
DIARRHEA: 0
ABDOMINAL PAIN: 0
COUGH: 0
APNEA: 0
VOMITING: 0
CONSTIPATION: 0
BLOOD IN STOOL: 0
SHORTNESS OF BREATH: 0
NAUSEA: 0

## 2019-03-04 RX ORDER — LOSARTAN POTASSIUM 100 MG/1
TABLET ORAL
Qty: 30 TABLET | Refills: 5 | Status: SHIPPED | OUTPATIENT
Start: 2019-03-04 | End: 2019-10-04 | Stop reason: SDUPTHER

## 2019-03-04 RX ORDER — AMLODIPINE BESYLATE 10 MG/1
TABLET ORAL
Qty: 30 TABLET | Refills: 5 | Status: SHIPPED | OUTPATIENT
Start: 2019-03-04 | End: 2019-10-11 | Stop reason: SDUPTHER

## 2019-03-04 RX ORDER — METOPROLOL SUCCINATE 50 MG/1
TABLET, EXTENDED RELEASE ORAL
Qty: 30 TABLET | Refills: 5 | Status: SHIPPED | OUTPATIENT
Start: 2019-03-04 | End: 2019-10-11 | Stop reason: SDUPTHER

## 2019-07-23 ENCOUNTER — OFFICE VISIT (OUTPATIENT)
Dept: FAMILY MEDICINE CLINIC | Age: 59
End: 2019-07-23
Payer: COMMERCIAL

## 2019-07-23 VITALS
OXYGEN SATURATION: 96 % | BODY MASS INDEX: 29.79 KG/M2 | SYSTOLIC BLOOD PRESSURE: 132 MMHG | HEIGHT: 67 IN | RESPIRATION RATE: 16 BRPM | TEMPERATURE: 98.2 F | WEIGHT: 189.8 LBS | HEART RATE: 84 BPM | DIASTOLIC BLOOD PRESSURE: 80 MMHG

## 2019-07-23 DIAGNOSIS — Z12.5 PROSTATE CANCER SCREENING: ICD-10-CM

## 2019-07-23 DIAGNOSIS — E78.5 DYSLIPIDEMIA: ICD-10-CM

## 2019-07-23 DIAGNOSIS — I10 ESSENTIAL HYPERTENSION: ICD-10-CM

## 2019-07-23 DIAGNOSIS — M54.50 LUMBAR BACK PAIN: Primary | ICD-10-CM

## 2019-07-23 DIAGNOSIS — Z12.11 COLON CANCER SCREENING: ICD-10-CM

## 2019-07-23 DIAGNOSIS — M25.552 LEFT HIP PAIN: ICD-10-CM

## 2019-07-23 PROCEDURE — 99214 OFFICE O/P EST MOD 30 MIN: CPT | Performed by: FAMILY MEDICINE

## 2019-07-23 PROCEDURE — 4004F PT TOBACCO SCREEN RCVD TLK: CPT | Performed by: FAMILY MEDICINE

## 2019-07-23 PROCEDURE — G8419 CALC BMI OUT NRM PARAM NOF/U: HCPCS | Performed by: FAMILY MEDICINE

## 2019-07-23 PROCEDURE — G8427 DOCREV CUR MEDS BY ELIG CLIN: HCPCS | Performed by: FAMILY MEDICINE

## 2019-07-23 PROCEDURE — 3017F COLORECTAL CA SCREEN DOC REV: CPT | Performed by: FAMILY MEDICINE

## 2019-07-23 ASSESSMENT — PATIENT HEALTH QUESTIONNAIRE - PHQ9
SUM OF ALL RESPONSES TO PHQ QUESTIONS 1-9: 0
SUM OF ALL RESPONSES TO PHQ9 QUESTIONS 1 & 2: 0
2. FEELING DOWN, DEPRESSED OR HOPELESS: 0
SUM OF ALL RESPONSES TO PHQ QUESTIONS 1-9: 0
1. LITTLE INTEREST OR PLEASURE IN DOING THINGS: 0

## 2019-07-26 ENCOUNTER — TELEPHONE (OUTPATIENT)
Dept: ENDOSCOPY | Age: 59
End: 2019-07-26

## 2019-08-02 ENCOUNTER — HOSPITAL ENCOUNTER (OUTPATIENT)
Dept: GENERAL RADIOLOGY | Age: 59
Discharge: HOME | End: 2019-08-04
Payer: COMMERCIAL

## 2019-08-02 ENCOUNTER — HOSPITAL ENCOUNTER (OUTPATIENT)
Age: 59
Discharge: HOME | End: 2019-08-04
Payer: COMMERCIAL

## 2019-08-02 DIAGNOSIS — M54.50 LUMBAR BACK PAIN: ICD-10-CM

## 2019-08-02 DIAGNOSIS — M25.552 LEFT HIP PAIN: ICD-10-CM

## 2019-08-02 PROCEDURE — 73521 X-RAY EXAM HIPS BI 2 VIEWS: CPT

## 2019-08-02 PROCEDURE — 72110 X-RAY EXAM L-2 SPINE 4/>VWS: CPT

## 2019-08-04 ASSESSMENT — ENCOUNTER SYMPTOMS
SHORTNESS OF BREATH: 0
VOMITING: 0
APNEA: 0
COUGH: 0
CHEST TIGHTNESS: 0
DIARRHEA: 0
CONSTIPATION: 0
BACK PAIN: 1
ABDOMINAL PAIN: 0
WHEEZING: 0

## 2019-08-09 ENCOUNTER — ANESTHESIA (OUTPATIENT)
Dept: ENDOSCOPY | Age: 59
End: 2019-08-09
Payer: COMMERCIAL

## 2019-08-09 ENCOUNTER — ANESTHESIA EVENT (OUTPATIENT)
Dept: ENDOSCOPY | Age: 59
End: 2019-08-09
Payer: COMMERCIAL

## 2019-08-09 ENCOUNTER — HOSPITAL ENCOUNTER (OUTPATIENT)
Age: 59
Setting detail: OUTPATIENT SURGERY
Discharge: HOME | End: 2019-08-09
Attending: SPECIALIST | Admitting: SPECIALIST
Payer: COMMERCIAL

## 2019-08-09 VITALS
RESPIRATION RATE: 15 BRPM | SYSTOLIC BLOOD PRESSURE: 99 MMHG | OXYGEN SATURATION: 94 % | DIASTOLIC BLOOD PRESSURE: 55 MMHG

## 2019-08-09 VITALS
HEART RATE: 73 BPM | BODY MASS INDEX: 29.66 KG/M2 | WEIGHT: 189 LBS | OXYGEN SATURATION: 96 % | HEIGHT: 67 IN | RESPIRATION RATE: 16 BRPM | TEMPERATURE: 98.2 F | DIASTOLIC BLOOD PRESSURE: 93 MMHG | SYSTOLIC BLOOD PRESSURE: 135 MMHG

## 2019-08-09 PROCEDURE — 6360000002 HC RX W HCPCS: Performed by: NURSE ANESTHETIST, CERTIFIED REGISTERED

## 2019-08-09 PROCEDURE — 2580000003 HC RX 258: Performed by: NURSE ANESTHETIST, CERTIFIED REGISTERED

## 2019-08-09 PROCEDURE — 2580000003 HC RX 258: Performed by: SPECIALIST

## 2019-08-09 PROCEDURE — 88305 TISSUE EXAM BY PATHOLOGIST: CPT

## 2019-08-09 PROCEDURE — 2500000003 HC RX 250 WO HCPCS: Performed by: NURSE ANESTHETIST, CERTIFIED REGISTERED

## 2019-08-09 PROCEDURE — 45385 COLONOSCOPY W/LESION REMOVAL: CPT | Performed by: SPECIALIST

## 2019-08-09 PROCEDURE — 45380 COLONOSCOPY AND BIOPSY: CPT | Performed by: SPECIALIST

## 2019-08-09 PROCEDURE — 3700000000 HC ANESTHESIA ATTENDED CARE: Performed by: SPECIALIST

## 2019-08-09 PROCEDURE — 3609027000 HC COLONOSCOPY: Performed by: SPECIALIST

## 2019-08-09 PROCEDURE — 3700000001 HC ADD 15 MINUTES (ANESTHESIA): Performed by: SPECIALIST

## 2019-08-09 PROCEDURE — 7100000011 HC PHASE II RECOVERY - ADDTL 15 MIN: Performed by: SPECIALIST

## 2019-08-09 PROCEDURE — 7100000010 HC PHASE II RECOVERY - FIRST 15 MIN: Performed by: SPECIALIST

## 2019-08-09 RX ORDER — SODIUM CHLORIDE 0.9 % (FLUSH) 0.9 %
10 SYRINGE (ML) INJECTION EVERY 12 HOURS SCHEDULED
Status: DISCONTINUED | OUTPATIENT
Start: 2019-08-09 | End: 2019-08-09 | Stop reason: HOSPADM

## 2019-08-09 RX ORDER — SODIUM CHLORIDE 0.9 % (FLUSH) 0.9 %
10 SYRINGE (ML) INJECTION PRN
Status: DISCONTINUED | OUTPATIENT
Start: 2019-08-09 | End: 2019-08-09 | Stop reason: HOSPADM

## 2019-08-09 RX ORDER — LIDOCAINE HYDROCHLORIDE 10 MG/ML
1 INJECTION, SOLUTION EPIDURAL; INFILTRATION; INTRACAUDAL; PERINEURAL
Status: DISCONTINUED | OUTPATIENT
Start: 2019-08-09 | End: 2019-08-09 | Stop reason: HOSPADM

## 2019-08-09 RX ORDER — PROPOFOL 10 MG/ML
INJECTION, EMULSION INTRAVENOUS PRN
Status: DISCONTINUED | OUTPATIENT
Start: 2019-08-09 | End: 2019-08-09 | Stop reason: SDUPTHER

## 2019-08-09 RX ORDER — LIDOCAINE HYDROCHLORIDE 10 MG/ML
INJECTION, SOLUTION INFILTRATION; PERINEURAL PRN
Status: DISCONTINUED | OUTPATIENT
Start: 2019-08-09 | End: 2019-08-09 | Stop reason: SDUPTHER

## 2019-08-09 RX ORDER — SODIUM CHLORIDE 9 MG/ML
INJECTION, SOLUTION INTRAVENOUS CONTINUOUS PRN
Status: DISCONTINUED | OUTPATIENT
Start: 2019-08-09 | End: 2019-08-09 | Stop reason: SDUPTHER

## 2019-08-09 RX ORDER — SODIUM CHLORIDE 9 MG/ML
INJECTION, SOLUTION INTRAVENOUS CONTINUOUS
Status: DISCONTINUED | OUTPATIENT
Start: 2019-08-09 | End: 2019-08-09 | Stop reason: HOSPADM

## 2019-08-09 RX ORDER — ONDANSETRON 2 MG/ML
4 INJECTION INTRAMUSCULAR; INTRAVENOUS
Status: DISCONTINUED | OUTPATIENT
Start: 2019-08-09 | End: 2019-08-09 | Stop reason: HOSPADM

## 2019-08-09 RX ADMIN — PROPOFOL 30 MG: 10 INJECTION, EMULSION INTRAVENOUS at 09:15

## 2019-08-09 RX ADMIN — LIDOCAINE HYDROCHLORIDE 30 MG: 10 INJECTION, SOLUTION INFILTRATION; PERINEURAL at 09:07

## 2019-08-09 RX ADMIN — SODIUM CHLORIDE: 9 INJECTION, SOLUTION INTRAVENOUS at 08:49

## 2019-08-09 RX ADMIN — PROPOFOL 30 MG: 10 INJECTION, EMULSION INTRAVENOUS at 09:17

## 2019-08-09 RX ADMIN — PROPOFOL 30 MG: 10 INJECTION, EMULSION INTRAVENOUS at 09:23

## 2019-08-09 RX ADMIN — SODIUM CHLORIDE: 9 INJECTION, SOLUTION INTRAVENOUS at 08:39

## 2019-08-09 RX ADMIN — PROPOFOL 30 MG: 10 INJECTION, EMULSION INTRAVENOUS at 09:12

## 2019-08-09 RX ADMIN — PROPOFOL 100 MG: 10 INJECTION, EMULSION INTRAVENOUS at 09:10

## 2019-08-09 RX ADMIN — PROPOFOL 20 MG: 10 INJECTION, EMULSION INTRAVENOUS at 09:13

## 2019-08-09 RX ADMIN — PROPOFOL 20 MG: 10 INJECTION, EMULSION INTRAVENOUS at 09:07

## 2019-08-09 RX ADMIN — PROPOFOL 50 MG: 10 INJECTION, EMULSION INTRAVENOUS at 09:11

## 2019-08-09 ASSESSMENT — LIFESTYLE VARIABLES: SMOKING_STATUS: 1

## 2019-09-21 ENCOUNTER — HOSPITAL ENCOUNTER (OUTPATIENT)
Dept: LAB | Age: 59
Discharge: HOME | End: 2019-09-21
Payer: COMMERCIAL

## 2019-09-21 DIAGNOSIS — E78.5 DYSLIPIDEMIA: ICD-10-CM

## 2019-09-21 DIAGNOSIS — I10 ESSENTIAL HYPERTENSION: ICD-10-CM

## 2019-09-21 DIAGNOSIS — Z12.5 PROSTATE CANCER SCREENING: ICD-10-CM

## 2019-09-21 LAB
ALBUMIN SERPL-MCNC: 4.1 G/DL (ref 3.5–4.6)
ALP BLD-CCNC: 85 U/L (ref 35–104)
ALT SERPL-CCNC: 18 U/L (ref 0–41)
ANION GAP SERPL CALCULATED.3IONS-SCNC: 12 MEQ/L (ref 9–15)
AST SERPL-CCNC: 22 U/L (ref 0–40)
BASOPHILS ABSOLUTE: 0.1 K/UL (ref 0–0.2)
BASOPHILS RELATIVE PERCENT: 0.9 %
BILIRUB SERPL-MCNC: 0.9 MG/DL (ref 0.2–0.7)
BUN BLDV-MCNC: 21 MG/DL (ref 6–20)
CALCIUM SERPL-MCNC: 9.1 MG/DL (ref 8.5–9.9)
CHLORIDE BLD-SCNC: 107 MEQ/L (ref 95–107)
CHOLESTEROL, TOTAL: 196 MG/DL (ref 0–199)
CO2: 23 MEQ/L (ref 20–31)
CREAT SERPL-MCNC: 0.58 MG/DL (ref 0.7–1.2)
EOSINOPHILS ABSOLUTE: 0.3 K/UL (ref 0–0.7)
EOSINOPHILS RELATIVE PERCENT: 4.4 %
GFR AFRICAN AMERICAN: >60
GFR NON-AFRICAN AMERICAN: >60
GLOBULIN: 3.1 G/DL (ref 2.3–3.5)
GLUCOSE BLD-MCNC: 87 MG/DL (ref 70–99)
HCT VFR BLD CALC: 47.7 % (ref 42–52)
HDLC SERPL-MCNC: 55 MG/DL (ref 40–59)
HEMOGLOBIN: 15.5 G/DL (ref 14–18)
LDL CHOLESTEROL CALCULATED: 116 MG/DL (ref 0–129)
LYMPHOCYTES ABSOLUTE: 2 K/UL (ref 1–4.8)
LYMPHOCYTES RELATIVE PERCENT: 27.9 %
MCH RBC QN AUTO: 30.4 PG (ref 27–31.3)
MCHC RBC AUTO-ENTMCNC: 32.5 % (ref 33–37)
MCV RBC AUTO: 93.6 FL (ref 80–100)
MONOCYTES ABSOLUTE: 0.8 K/UL (ref 0.2–0.8)
MONOCYTES RELATIVE PERCENT: 11.7 %
NEUTROPHILS ABSOLUTE: 4 K/UL (ref 1.4–6.5)
NEUTROPHILS RELATIVE PERCENT: 55.1 %
PDW BLD-RTO: 13.7 % (ref 11.5–14.5)
PLATELET # BLD: 262 K/UL (ref 130–400)
POTASSIUM SERPL-SCNC: 4.5 MEQ/L (ref 3.4–4.9)
PROSTATE SPECIFIC ANTIGEN: 1.28 NG/ML (ref 0–3.89)
RBC # BLD: 5.09 M/UL (ref 4.7–6.1)
SODIUM BLD-SCNC: 142 MEQ/L (ref 135–144)
TOTAL PROTEIN: 7.2 G/DL (ref 6.3–8)
TRIGL SERPL-MCNC: 126 MG/DL (ref 0–150)
WBC # BLD: 7.2 K/UL (ref 4.8–10.8)

## 2019-09-21 PROCEDURE — 85025 COMPLETE CBC W/AUTO DIFF WBC: CPT

## 2019-09-21 PROCEDURE — 36415 COLL VENOUS BLD VENIPUNCTURE: CPT

## 2019-09-21 PROCEDURE — 80053 COMPREHEN METABOLIC PANEL: CPT

## 2019-09-21 PROCEDURE — 84153 ASSAY OF PSA TOTAL: CPT

## 2019-09-21 PROCEDURE — 80061 LIPID PANEL: CPT

## 2019-10-07 RX ORDER — LOSARTAN POTASSIUM 100 MG/1
100 TABLET ORAL DAILY
Qty: 30 TABLET | Refills: 5 | Status: SHIPPED | OUTPATIENT
Start: 2019-10-07 | End: 2020-05-06

## 2019-10-15 RX ORDER — AMLODIPINE BESYLATE 10 MG/1
TABLET ORAL
Qty: 30 TABLET | Refills: 5 | Status: SHIPPED | OUTPATIENT
Start: 2019-10-15 | End: 2020-03-24

## 2019-10-15 RX ORDER — METOPROLOL SUCCINATE 50 MG/1
TABLET, EXTENDED RELEASE ORAL
Qty: 30 TABLET | Refills: 5 | Status: SHIPPED | OUTPATIENT
Start: 2019-10-15 | End: 2020-03-24

## 2020-03-17 ENCOUNTER — OFFICE VISIT (OUTPATIENT)
Dept: FAMILY MEDICINE CLINIC | Age: 60
End: 2020-03-17
Payer: COMMERCIAL

## 2020-03-17 VITALS
HEART RATE: 72 BPM | BODY MASS INDEX: 30.13 KG/M2 | SYSTOLIC BLOOD PRESSURE: 118 MMHG | HEIGHT: 67 IN | WEIGHT: 192 LBS | OXYGEN SATURATION: 95 % | DIASTOLIC BLOOD PRESSURE: 72 MMHG | TEMPERATURE: 97.8 F | RESPIRATION RATE: 14 BRPM

## 2020-03-17 LAB
INFLUENZA A ANTIBODY: NORMAL
INFLUENZA B ANTIBODY: NORMAL

## 2020-03-17 PROCEDURE — G8427 DOCREV CUR MEDS BY ELIG CLIN: HCPCS | Performed by: NURSE PRACTITIONER

## 2020-03-17 PROCEDURE — 87804 INFLUENZA ASSAY W/OPTIC: CPT | Performed by: NURSE PRACTITIONER

## 2020-03-17 PROCEDURE — 3017F COLORECTAL CA SCREEN DOC REV: CPT | Performed by: NURSE PRACTITIONER

## 2020-03-17 PROCEDURE — 4004F PT TOBACCO SCREEN RCVD TLK: CPT | Performed by: NURSE PRACTITIONER

## 2020-03-17 PROCEDURE — G8417 CALC BMI ABV UP PARAM F/U: HCPCS | Performed by: NURSE PRACTITIONER

## 2020-03-17 PROCEDURE — G8484 FLU IMMUNIZE NO ADMIN: HCPCS | Performed by: NURSE PRACTITIONER

## 2020-03-17 PROCEDURE — 99212 OFFICE O/P EST SF 10 MIN: CPT | Performed by: NURSE PRACTITIONER

## 2020-03-17 ASSESSMENT — PATIENT HEALTH QUESTIONNAIRE - PHQ9
SUM OF ALL RESPONSES TO PHQ QUESTIONS 1-9: 0
1. LITTLE INTEREST OR PLEASURE IN DOING THINGS: 0
SUM OF ALL RESPONSES TO PHQ9 QUESTIONS 1 & 2: 0
SUM OF ALL RESPONSES TO PHQ QUESTIONS 1-9: 0
2. FEELING DOWN, DEPRESSED OR HOPELESS: 0

## 2020-03-17 ASSESSMENT — ENCOUNTER SYMPTOMS
NAUSEA: 1
CONSTIPATION: 0
WHEEZING: 0
SINUS PRESSURE: 0
COUGH: 0
VOMITING: 0
ABDOMINAL PAIN: 0
SINUS PAIN: 0
DIARRHEA: 0
SORE THROAT: 0
RHINORRHEA: 1
SHORTNESS OF BREATH: 0
COLOR CHANGE: 0

## 2020-03-24 NOTE — TELEPHONE ENCOUNTER
lov 7/23/19  No follow up  Requested Prescriptions     Pending Prescriptions Disp Refills    amLODIPine (NORVASC) 10 MG tablet [Pharmacy Med Name: amlodipine 10 mg tablet] 30 tablet 5     Sig: TAKE 1 TABLET BY MOUTH DAILY    metoprolol succinate (TOPROL XL) 50 MG extended release tablet [Pharmacy Med Name: metoprolol succinate ER 50 mg tablet,extended release 24 hr] 30 tablet 5     Sig: TAKE 1 TABLET BY MOUTH DAILY

## 2020-03-25 RX ORDER — METOPROLOL SUCCINATE 50 MG/1
TABLET, EXTENDED RELEASE ORAL
Qty: 90 TABLET | Refills: 0 | Status: SHIPPED | OUTPATIENT
Start: 2020-03-25 | End: 2020-07-01 | Stop reason: SDUPTHER

## 2020-03-25 RX ORDER — AMLODIPINE BESYLATE 10 MG/1
TABLET ORAL
Qty: 90 TABLET | Refills: 0 | Status: SHIPPED | OUTPATIENT
Start: 2020-03-25 | End: 2020-07-01 | Stop reason: SDUPTHER

## 2020-05-06 RX ORDER — LOSARTAN POTASSIUM 100 MG/1
100 TABLET ORAL DAILY
Qty: 30 TABLET | Refills: 5 | Status: SHIPPED | OUTPATIENT
Start: 2020-05-06 | End: 2020-07-01 | Stop reason: SDUPTHER

## 2020-07-01 ENCOUNTER — OFFICE VISIT (OUTPATIENT)
Dept: FAMILY MEDICINE CLINIC | Age: 60
End: 2020-07-01
Payer: COMMERCIAL

## 2020-07-01 VITALS
DIASTOLIC BLOOD PRESSURE: 80 MMHG | HEIGHT: 67 IN | BODY MASS INDEX: 29.35 KG/M2 | SYSTOLIC BLOOD PRESSURE: 132 MMHG | HEART RATE: 64 BPM | OXYGEN SATURATION: 94 % | WEIGHT: 187 LBS | RESPIRATION RATE: 14 BRPM | TEMPERATURE: 98.5 F

## 2020-07-01 PROCEDURE — G8427 DOCREV CUR MEDS BY ELIG CLIN: HCPCS | Performed by: FAMILY MEDICINE

## 2020-07-01 PROCEDURE — 3017F COLORECTAL CA SCREEN DOC REV: CPT | Performed by: FAMILY MEDICINE

## 2020-07-01 PROCEDURE — 99213 OFFICE O/P EST LOW 20 MIN: CPT | Performed by: FAMILY MEDICINE

## 2020-07-01 PROCEDURE — 4004F PT TOBACCO SCREEN RCVD TLK: CPT | Performed by: FAMILY MEDICINE

## 2020-07-01 PROCEDURE — G8417 CALC BMI ABV UP PARAM F/U: HCPCS | Performed by: FAMILY MEDICINE

## 2020-07-01 RX ORDER — LOSARTAN POTASSIUM 100 MG/1
100 TABLET ORAL DAILY
Qty: 30 TABLET | Refills: 5 | Status: SHIPPED | OUTPATIENT
Start: 2020-07-01 | End: 2020-12-02 | Stop reason: SDUPTHER

## 2020-07-01 RX ORDER — METOPROLOL SUCCINATE 50 MG/1
50 TABLET, EXTENDED RELEASE ORAL DAILY
Qty: 90 TABLET | Refills: 0 | Status: SHIPPED | OUTPATIENT
Start: 2020-07-01 | End: 2020-12-02 | Stop reason: SDUPTHER

## 2020-07-01 RX ORDER — AMLODIPINE BESYLATE 10 MG/1
10 TABLET ORAL DAILY
Qty: 90 TABLET | Refills: 0 | Status: SHIPPED | OUTPATIENT
Start: 2020-07-01 | End: 2020-12-02 | Stop reason: SDUPTHER

## 2020-07-01 NOTE — PROGRESS NOTES
Subjective:      Patient ID: Alexy Perez is a 64 y.o. male who presents today for:     Chief Complaint   Patient presents with    Hypertension     Patient presents with follow up HTN.  Cataract       Hypertension   This is a chronic problem. The problem is controlled. Pertinent negatives include no anxiety, blurred vision, chest pain, headaches, malaise/fatigue, neck pain, orthopnea, palpitations, peripheral edema, PND, shortness of breath or sweats. There are no associated agents to hypertension. Past treatments include beta blockers, calcium channel blockers and ACE inhibitors. The current treatment provides significant improvement. There are no compliance problems. Past Medical History:   Diagnosis Date    Anxiety     Hypertension     Osteoarthritis      Past Surgical History:   Procedure Laterality Date    CERVICAL [address] SURGERY  4/23/13    DR. PIZARRO     COLONOSCOPY  6/13/14     [facility]    COLONOSCOPY N/A 8/9/2019    COLORECTAL CANCER SCREENING, HIGH RISK performed by Gerard Chapman MD at [address]  2004    JOINT REPLACEMENT      RTHR    SPINE SURGERY  06/30/2016    lumbar    TOTAL HIP ARTHROPLASTY Right 12/2009     Family History   Problem Relation Age of Onset    COPD Mother     Stroke Father     High Blood Pressure Brother      Social History     Socioeconomic History    Marital status:      Spouse name: Not on file    Number of children: Not on file    Years of education: Not on file    Highest education level: Not on file   Occupational History    Not on file   Social Needs    Financial resource strain: Not on file    Food insecurity     Worry: Not on file     Inability: Not on file   [employer] needs     Medical: Not on file     Non-medical: Not on file   Tobacco Use    Smoking status: Current Every Day Smoker     Packs/day: 0.10     Years: 18.00     Pack years: 1.80     Types: Cigars     Start date: 2000    Smokeless tobacco: Never Used    Tobacco comment: 1-2 per day   Substance and Sexual Activity    Alcohol use: Yes     Alcohol/week: 0.0 standard drinks     Comment: 21 beers a week, 2 shots of liquor a week     Drug use: No    Sexual activity: Not on file   Lifestyle    Physical activity     Days per week: Not on file     Minutes per session: Not on file    Stress: Not on file   Relationships    Social connections     Talks on phone: Not on file     Gets together: Not on file     Attends Hindu service: Not on file     Active member of club or organization: Not on file     Attends meetings of clubs or organizations: Not on file     Relationship status: Not on file    Intimate partner violence     Fear of current or ex partner: Not on file     Emotionally abused: Not on file     Physically abused: Not on file     Forced sexual activity: Not on file   Other Topics Concern    Not on file   Social History Narrative    Not on file     Current Outpatient Medications on File Prior to Visit   Medication Sig Dispense Refill    aspirin 81 MG tablet Take 81 mg by mouth daily       No current facility-administered medications on file prior to visit. Allergies:  Morphine and Lisinopril    Review of Systems   Constitutional: Negative for activity change, appetite change, fatigue and malaise/fatigue. Eyes: Negative for blurred vision. Respiratory: Negative for apnea, cough, chest tightness and shortness of breath. Cardiovascular: Negative for chest pain, palpitations, orthopnea, leg swelling and PND. Gastrointestinal: Negative for abdominal pain, blood in stool, constipation, diarrhea, nausea and vomiting. Musculoskeletal: Negative for arthralgias and neck pain. Neurological: Negative for seizures and headaches. Psychiatric/Behavioral: Negative for hallucinations and suicidal ideas.        Objective:   /80 (Site: Right Upper Arm, Position: Sitting, Cuff Size: Large Adult)   Pulse 64   Temp 98.5 °F (36.9 °C) (Temporal)   Resp 14   Ht 5' 7\" (1.702 m)   Wt 187 lb (84.8 kg)   SpO2 94%   BMI 29.29 kg/m²     Physical Exam  Vitals signs and nursing note reviewed. Constitutional:       General: He is not in acute distress. Appearance: Normal appearance. He is well-developed. He is not diaphoretic. HENT:      Head: Normocephalic and atraumatic. Nose: Nose normal.      Mouth/Throat:      Mouth: Mucous membranes are moist.      Pharynx: Oropharynx is clear. Eyes:      Conjunctiva/sclera: Conjunctivae normal.      Pupils: Pupils are equal, round, and reactive to light. Neck:      Musculoskeletal: Normal range of motion. Cardiovascular:      Rate and Rhythm: Normal rate and regular rhythm. Heart sounds: Normal heart sounds. No murmur. No friction rub. No gallop. Pulmonary:      Effort: Pulmonary effort is normal. No respiratory distress. Breath sounds: Normal breath sounds. No wheezing or rales. Chest:      Chest wall: No tenderness. Abdominal:      General: Abdomen is flat. Bowel sounds are normal.      Palpations: Abdomen is soft. Tenderness: There is no abdominal tenderness. Skin:     General: Skin is warm and dry. Neurological:      Mental Status: He is alert and oriented to person, place, and time. Psychiatric:         Behavior: Behavior normal.         Thought Content: Thought content normal.         Judgment: Judgment normal.         Assessment & Plan:     1. Essential hypertension  Continue current medication  Would like to try to decrease medication, blood pressure is on the higher end and will only consider decreasing medication once blood pressure is below 120  - metoprolol succinate (TOPROL XL) 50 MG extended release tablet; Take 1 tablet by mouth daily  Dispense: 90 tablet; Refill: 0  - amLODIPine (NORVASC) 10 MG tablet; Take 1 tablet by mouth daily  Dispense: 90 tablet; Refill: 0  - losartan (COZAAR) 100 MG tablet;  Take 1 tablet by mouth daily  Dispense: 30 tablet;

## 2020-07-02 ASSESSMENT — ENCOUNTER SYMPTOMS
CONSTIPATION: 0
VOMITING: 0
APNEA: 0
BLOOD IN STOOL: 0
NAUSEA: 0
BLURRED VISION: 0
COUGH: 0
CHEST TIGHTNESS: 0
ORTHOPNEA: 0
DIARRHEA: 0
SHORTNESS OF BREATH: 0
ABDOMINAL PAIN: 0

## 2020-09-04 ENCOUNTER — HOSPITAL ENCOUNTER (OUTPATIENT)
Dept: LAB | Age: 60
Discharge: HOME | End: 2020-09-04
Payer: COMMERCIAL

## 2020-09-04 LAB
ALBUMIN SERPL-MCNC: 4.1 G/DL (ref 3.5–4.6)
ALP BLD-CCNC: 85 U/L (ref 35–104)
ALT SERPL-CCNC: 16 U/L (ref 0–41)
ANION GAP SERPL CALCULATED.3IONS-SCNC: 13 MEQ/L (ref 9–15)
AST SERPL-CCNC: 21 U/L (ref 0–40)
BASOPHILS ABSOLUTE: 0.1 K/UL (ref 0–0.2)
BASOPHILS RELATIVE PERCENT: 1.2 %
BILIRUB SERPL-MCNC: 0.6 MG/DL (ref 0.2–0.7)
BUN BLDV-MCNC: 20 MG/DL (ref 6–20)
CALCIUM SERPL-MCNC: 8.9 MG/DL (ref 8.5–9.9)
CHLORIDE BLD-SCNC: 104 MEQ/L (ref 95–107)
CHOLESTEROL, TOTAL: 197 MG/DL (ref 0–199)
CO2: 24 MEQ/L (ref 20–31)
CREAT SERPL-MCNC: 0.64 MG/DL (ref 0.7–1.2)
EOSINOPHILS ABSOLUTE: 0.3 K/UL (ref 0–0.7)
EOSINOPHILS RELATIVE PERCENT: 5.1 %
GFR AFRICAN AMERICAN: >60
GFR NON-AFRICAN AMERICAN: >60
GLOBULIN: 2.8 G/DL (ref 2.3–3.5)
GLUCOSE BLD-MCNC: 82 MG/DL (ref 70–99)
HCT VFR BLD CALC: 46.5 % (ref 42–52)
HDLC SERPL-MCNC: 54 MG/DL (ref 40–59)
HEMOGLOBIN: 15.5 G/DL (ref 14–18)
LDL CHOLESTEROL CALCULATED: 111 MG/DL (ref 0–129)
LYMPHOCYTES ABSOLUTE: 2 K/UL (ref 1–4.8)
LYMPHOCYTES RELATIVE PERCENT: 34.1 %
MCH RBC QN AUTO: 30.7 PG (ref 27–31.3)
MCHC RBC AUTO-ENTMCNC: 33.3 % (ref 33–37)
MCV RBC AUTO: 92.3 FL (ref 80–100)
MONOCYTES ABSOLUTE: 0.6 K/UL (ref 0.2–0.8)
MONOCYTES RELATIVE PERCENT: 11.2 %
NEUTROPHILS ABSOLUTE: 2.8 K/UL (ref 1.4–6.5)
NEUTROPHILS RELATIVE PERCENT: 48.4 %
PDW BLD-RTO: 13.3 % (ref 11.5–14.5)
PLATELET # BLD: 266 K/UL (ref 130–400)
POTASSIUM SERPL-SCNC: 4.1 MEQ/L (ref 3.4–4.9)
PROSTATE SPECIFIC ANTIGEN: 1.19 NG/ML (ref 0–3.89)
RBC # BLD: 5.04 M/UL (ref 4.7–6.1)
SODIUM BLD-SCNC: 141 MEQ/L (ref 135–144)
TOTAL PROTEIN: 6.9 G/DL (ref 6.3–8)
TRIGL SERPL-MCNC: 158 MG/DL (ref 0–150)
WBC # BLD: 5.8 K/UL (ref 4.8–10.8)

## 2020-09-04 PROCEDURE — 80061 LIPID PANEL: CPT

## 2020-09-04 PROCEDURE — 80053 COMPREHEN METABOLIC PANEL: CPT

## 2020-09-04 PROCEDURE — 84153 ASSAY OF PSA TOTAL: CPT

## 2020-09-04 PROCEDURE — 85025 COMPLETE CBC W/AUTO DIFF WBC: CPT

## 2020-09-04 PROCEDURE — 36415 COLL VENOUS BLD VENIPUNCTURE: CPT

## 2020-10-12 ENCOUNTER — TELEPHONE (OUTPATIENT)
Dept: FAMILY MEDICINE CLINIC | Age: 60
End: 2020-10-12

## 2020-10-12 NOTE — TELEPHONE ENCOUNTER
Patient was wondering if he needs to schedule an appointment to go over his medications now that he has labs done? He is not sure if you wanted to change any of his meds or not. Please advise.

## 2020-12-02 ENCOUNTER — OFFICE VISIT (OUTPATIENT)
Dept: FAMILY MEDICINE CLINIC | Age: 60
End: 2020-12-02
Payer: COMMERCIAL

## 2020-12-02 VITALS
TEMPERATURE: 97.6 F | HEART RATE: 92 BPM | SYSTOLIC BLOOD PRESSURE: 146 MMHG | DIASTOLIC BLOOD PRESSURE: 76 MMHG | OXYGEN SATURATION: 99 % | HEIGHT: 67 IN | BODY MASS INDEX: 29.82 KG/M2 | WEIGHT: 190 LBS

## 2020-12-02 PROCEDURE — 99396 PREV VISIT EST AGE 40-64: CPT | Performed by: FAMILY MEDICINE

## 2020-12-02 PROCEDURE — G8484 FLU IMMUNIZE NO ADMIN: HCPCS | Performed by: FAMILY MEDICINE

## 2020-12-02 RX ORDER — METOPROLOL SUCCINATE 50 MG/1
50 TABLET, EXTENDED RELEASE ORAL DAILY
Qty: 90 TABLET | Refills: 0 | Status: SHIPPED | OUTPATIENT
Start: 2020-12-02 | End: 2021-03-15

## 2020-12-02 RX ORDER — AMLODIPINE BESYLATE 10 MG/1
10 TABLET ORAL DAILY
Qty: 90 TABLET | Refills: 0 | Status: SHIPPED | OUTPATIENT
Start: 2020-12-02 | End: 2021-03-15

## 2020-12-02 RX ORDER — LOSARTAN POTASSIUM 100 MG/1
100 TABLET ORAL DAILY
Qty: 30 TABLET | Refills: 5 | Status: SHIPPED | OUTPATIENT
Start: 2020-12-02

## 2020-12-02 NOTE — PROGRESS NOTES
2020    Neville Martin (:  1960) is a 64 y.o. male, here for a preventive medicine evaluation. Patient states that he is doing well after recent COVID-19 diagnosis. He is asymptomatic. Patient Active Problem List   Diagnosis    Osteoarthritis    Anxiety    HTN (hypertension)    Dyslipidemia    Drug-induced erectile dysfunction    S/P lumbar microdiscectomy    Herniated lumbar intervertebral disc    Adenomatous polyp of sigmoid colon    Adenomatous polyp of ascending colon       Review of Systems   Constitutional: Negative for activity change, appetite change, chills, diaphoresis, fatigue, fever and unexpected weight change. HENT: Negative for congestion, dental problem, ear discharge, ear pain, facial swelling, hearing loss, mouth sores, nosebleeds, postnasal drip, rhinorrhea, sinus pressure, sneezing, sore throat, tinnitus, trouble swallowing and voice change. Eyes: Negative for photophobia, pain, discharge, redness, itching and visual disturbance. Respiratory: Negative for apnea, cough, choking, chest tightness, shortness of breath and wheezing. Cardiovascular: Negative for chest pain, palpitations and leg swelling. Gastrointestinal: Negative for abdominal distention, abdominal pain, anal bleeding, blood in stool, constipation, diarrhea, nausea and vomiting. Endocrine: Negative for cold intolerance, heat intolerance, polydipsia, polyphagia and polyuria. Genitourinary: Negative for difficulty urinating, discharge, dysuria, flank pain, frequency, genital sores, hematuria, penile pain, scrotal swelling, testicular pain and urgency. Musculoskeletal: Negative for arthralgias, back pain, gait problem, joint swelling, myalgias and neck pain. Skin: Negative for rash. Neurological: Negative for dizziness, seizures, syncope, weakness, light-headedness, numbness and headaches. Hematological: Negative for adenopathy.    Psychiatric/Behavioral: Negative for behavioral problems, confusion, hallucinations, self-injury and suicidal ideas. The patient is not nervous/anxious and is not hyperactive. Prior to Visit Medications    Medication Sig Taking? Authorizing Provider   tadalafil (CIALIS) 10 MG tablet Take 1 tablet by mouth daily as needed for Erectile Dysfunction Yes Carole Barroso MD   amLODIPine (NORVASC) 10 MG tablet Take 1 tablet by mouth daily Yes Carole Barroso MD   losartan (COZAAR) 100 MG tablet Take 1 tablet by mouth daily Yes Carole Barroso MD   metoprolol succinate (TOPROL XL) 50 MG extended release tablet Take 1 tablet by mouth daily Yes Carole Barroso MD   aspirin 81 MG tablet Take 81 mg by mouth daily Yes Historical Provider, MD        Allergies   Allergen Reactions    Morphine      Hallucinations      Lisinopril      cough       Past Medical History:   Diagnosis Date    Anxiety     Hypertension     Osteoarthritis        Past Surgical History:   Procedure Laterality Date    CERVICAL [address] SURGERY  4/23/13    DR. PIZARRO     COLONOSCOPY  6/13/14    DR Natanael Hernandez    COLONOSCOPY N/A 8/9/2019    COLORECTAL CANCER SCREENING, HIGH RISK performed by Alma Mcadams MD at [address]  2004    JOINT REPLACEMENT      RTHR    SPINE SURGERY  06/30/2016    lumbar    TOTAL HIP ARTHROPLASTY Right 12/2009       Social History     Socioeconomic History    Marital status:      Spouse name: Not on file    Number of children: Not on file    Years of education: Not on file    Highest education level: Not on file   Occupational History    Not on file   Social Needs    Financial resource strain: Not on file    Food insecurity     Worry: Not on file     Inability: Not on file    Transportation needs     Medical: Not on file     Non-medical: Not on file   Tobacco Use    Smoking status: Current Every Day Smoker     Packs/day: 0.10     Years: 18.00     Pack years: 1.80     Types: Cigars     Start date: 2000    Smokeless tobacco: Never Used    Tobacco comment: 1-2 per day   Substance and Sexual Activity    Alcohol use: Yes     Alcohol/week: 0.0 standard drinks     Comment: 21 beers a week, 2 shots of liquor a week     Drug use: No    Sexual activity: Not on file   Lifestyle    Physical activity     Days per week: Not on file     Minutes per session: Not on file    Stress: Not on file   Relationships    Social connections     Talks on phone: Not on file     Gets together: Not on file     Attends Anglican service: Not on file     Active member of club or organization: Not on file     Attends meetings of clubs or organizations: Not on file     Relationship status: Not on file    Intimate partner violence     Fear of current or ex partner: Not on file     Emotionally abused: Not on file     Physically abused: Not on file     Forced sexual activity: Not on file   Other Topics Concern    Not on file   Social History Narrative    Not on file        Family History   Problem Relation Age of Onset    COPD Mother     Stroke Father     High Blood Pressure Brother        ADVANCE DIRECTIVE: N, <no information>    Vitals:    12/02/20 1514   BP: (!) 146/76   Site: Left Upper Arm   Position: Sitting   Cuff Size: Large Adult   Pulse: 92   Temp: 97.6 °F (36.4 °C)   SpO2: 99%   Weight: 190 lb (86.2 kg)   Height: 5' 7\" (1.702 m)     Estimated body mass index is 29.76 kg/m² as calculated from the following:    Height as of this encounter: 5' 7\" (1.702 m). Weight as of this encounter: 190 lb (86.2 kg). Physical Exam  Vitals signs reviewed. Constitutional:       General: He is not in acute distress. Appearance: Normal appearance. He is well-developed and normal weight. He is not diaphoretic. HENT:      Head: Normocephalic and atraumatic.       Right Ear: Tympanic membrane, ear canal and external ear normal.      Left Ear: Tympanic membrane, ear canal and external ear normal.      Nose: Nose normal.      Mouth/Throat:      Mouth: Mucous membranes are moist.      Pharynx: Oropharynx is clear. No oropharyngeal exudate. Eyes:      General: No scleral icterus. Right eye: No discharge. Left eye: No discharge. Extraocular Movements: Extraocular movements intact. Conjunctiva/sclera: Conjunctivae normal.      Pupils: Pupils are equal, round, and reactive to light. Neck:      Musculoskeletal: Normal range of motion and neck supple. Thyroid: No thyromegaly. Vascular: No carotid bruit or JVD. Cardiovascular:      Rate and Rhythm: Normal rate and regular rhythm. Pulses: Normal pulses. Heart sounds: Normal heart sounds. No murmur. No friction rub. No gallop. Pulmonary:      Effort: Pulmonary effort is normal. No respiratory distress. Breath sounds: Normal breath sounds. No stridor. No wheezing or rales. Chest:      Chest wall: No tenderness. Abdominal:      General: Abdomen is flat. Bowel sounds are normal. There is no distension. Palpations: Abdomen is soft. There is no mass. Tenderness: There is no abdominal tenderness. There is no right CVA tenderness, left CVA tenderness, guarding or rebound. Musculoskeletal: Normal range of motion. General: No swelling, tenderness or deformity. Lymphadenopathy:      Cervical: No cervical adenopathy. Skin:     General: Skin is warm and dry. Coloration: Skin is not pale. Findings: No erythema or rash. Neurological:      General: No focal deficit present. Mental Status: He is alert and oriented to person, place, and time. Mental status is at baseline. Cranial Nerves: No cranial nerve deficit. Motor: No abnormal muscle tone. Coordination: Coordination normal.      Deep Tendon Reflexes: Reflexes are normal and symmetric. Reflexes normal.   Psychiatric:         Behavior: Behavior normal.         Thought Content: Thought content normal.         Judgment: Judgment normal.         No flowsheet data found.     Lab Results Component Value Date    CHOL 197 09/04/2020    CHOL 196 09/21/2019    CHOL 225 07/17/2018    TRIG 158 09/04/2020    TRIG 126 09/21/2019    TRIG 111 07/17/2018    HDL 54 09/04/2020    HDL 55 09/21/2019    HDL 61 07/17/2018    LDLCALC 111 09/04/2020    LDLCALC 116 09/21/2019    LDLCALC 142 07/17/2018    GLUCOSE 82 09/04/2020    GLUCOSE 105 02/15/2012       The 10-year ASCVD risk score (Robina Stanton, et al., 2013) is: 16.5%    Values used to calculate the score:      Age: 64 years      Sex: Male      Is Non- : No      Diabetic: No      Tobacco smoker: Yes      Systolic Blood Pressure: 944 mmHg      Is BP treated: Yes      HDL Cholesterol: 54 mg/dL      Total Cholesterol: 197 mg/dL      There is no immunization history on file for this patient. Health Maintenance   Topic Date Due    Hepatitis C screen  1960    Pneumococcal 0-64 years Vaccine (1 of 1 - PPSV23) 09/08/1968    HIV screen  09/08/1977    DTaP/Tdap/Td vaccine (1 - Tdap) 09/08/1981    Shingles Vaccine (1 of 2) 09/08/2012    Flu vaccine (1) 09/01/2020    Potassium monitoring  09/04/2021    Creatinine monitoring  09/04/2021    Colon cancer screen colonoscopy  08/09/2024    Lipid screen  09/04/2025    Hepatitis A vaccine  Aged Out    Hepatitis B vaccine  Aged Out    Hib vaccine  Aged Out    Meningococcal (ACWY) vaccine  Aged Out       ASSESSMENT/PLAN:  1. Essential hypertension  Advised the patient keep a close eye on blood pressure intake daily and report back in 2 weeks  - amLODIPine (NORVASC) 10 MG tablet; Take 1 tablet by mouth daily  Dispense: 90 tablet; Refill: 0  - losartan (COZAAR) 100 MG tablet; Take 1 tablet by mouth daily  Dispense: 30 tablet; Refill: 5  - metoprolol succinate (TOPROL XL) 50 MG extended release tablet; Take 1 tablet by mouth daily  Dispense: 90 tablet; Refill: 0    2.  Erectile dysfunction, unspecified erectile dysfunction type  Patient was prescribed Cialis in the past and tolerated well  - tadalafil (CIALIS) 10 MG tablet; Take 1 tablet by mouth daily as needed for Erectile Dysfunction  Dispense: 10 tablet; Refill: 0      Return in about 6 months (around 6/2/2021), or if symptoms worsen or fail to improve, for F/u HTN.     An electronic signature was used to authenticate this note.    --Mike Henriquez MD on 12/3/2020 at 10:33 PM

## 2020-12-03 RX ORDER — TADALAFIL 10 MG/1
10 TABLET ORAL DAILY PRN
Qty: 10 TABLET | Refills: 0 | Status: SHIPPED | OUTPATIENT
Start: 2020-12-03 | End: 2021-03-10

## 2020-12-03 ASSESSMENT — ENCOUNTER SYMPTOMS
NAUSEA: 0
COUGH: 0
PHOTOPHOBIA: 0
VOICE CHANGE: 0
SHORTNESS OF BREATH: 0
EYE DISCHARGE: 0
ANAL BLEEDING: 0
CHEST TIGHTNESS: 0
ABDOMINAL DISTENTION: 0
TROUBLE SWALLOWING: 0
SORE THROAT: 0
APNEA: 0
VOMITING: 0
SINUS PRESSURE: 0
ABDOMINAL PAIN: 0
FACIAL SWELLING: 0
BACK PAIN: 0
EYE REDNESS: 0
DIARRHEA: 0
CHOKING: 0
CONSTIPATION: 0
EYE PAIN: 0
RHINORRHEA: 0
EYE ITCHING: 0
BLOOD IN STOOL: 0
WHEEZING: 0

## 2020-12-22 ENCOUNTER — TELEPHONE (OUTPATIENT)
Dept: FAMILY MEDICINE CLINIC | Age: 60
End: 2020-12-22

## 2020-12-22 NOTE — TELEPHONE ENCOUNTER
Would like to see if patient will come in tomorrow to have an EKG performed and then we can likely either increase his metoprolol or place him on a mild diuretic

## 2020-12-24 ENCOUNTER — TELEPHONE (OUTPATIENT)
Dept: FAMILY MEDICINE CLINIC | Age: 60
End: 2020-12-24

## 2020-12-24 ENCOUNTER — NURSE ONLY (OUTPATIENT)
Dept: FAMILY MEDICINE CLINIC | Age: 60
End: 2020-12-24
Payer: COMMERCIAL

## 2020-12-24 VITALS
HEIGHT: 67 IN | OXYGEN SATURATION: 96 % | WEIGHT: 186 LBS | DIASTOLIC BLOOD PRESSURE: 85 MMHG | SYSTOLIC BLOOD PRESSURE: 138 MMHG | HEART RATE: 75 BPM | BODY MASS INDEX: 29.19 KG/M2 | RESPIRATION RATE: 18 BRPM

## 2020-12-24 PROCEDURE — 93000 ELECTROCARDIOGRAM COMPLETE: CPT | Performed by: FAMILY MEDICINE

## 2020-12-24 NOTE — TELEPHONE ENCOUNTER
Patient is here in office and would like clarification on his medications that will be changed because he is on different, he has been having high blood pressure readings and is wondering why there was talk of lowering his medication dose also would like a phone call asap about his EKG that was completed today.  He would prefer the medication change and ekg reading come from Dr. Giuliana Hernandez    Please advise

## 2020-12-24 NOTE — TELEPHONE ENCOUNTER
Pt was in office for an EKG due to having high BP readings, EKG was checked by Neema Hastings stated she would send a new medication for patients BP.   Pts BP reading today in the office was 138/ 85 on right upper arm  Pt stated he wanted this all ran by Dr. Indira Bhagat states e trust your decision on medication change but would lik Dr. Indira Bhagat opinion

## 2020-12-24 NOTE — TELEPHONE ENCOUNTER
Please let him know that Dr. Marlena Griffiths notified me that he wanted to start a low-dose additional blood pressure medication not decrease his medication. This would be added to his current medication. He has advised hydrochlorothiazide 12.5 mg.  I will send this in for him if he would like it over the weekend or else he can wait till next week and talk to Dr. Marlena Griffiths. EKG findings are overall normal with no acute abnormalities.

## 2020-12-24 NOTE — TELEPHONE ENCOUNTER
Patient would like to know why does he need an additional medication, and what will this medication do differently then the other 3 medications he is taking already? He can be reached at [phone]. He wants Dr. Dawn Marx to call him. He is aware that the provider will be back in the office on Monday.  Arturo Vásquez

## 2020-12-24 NOTE — TELEPHONE ENCOUNTER
Spoke to patient at length. We will wait until Monday to start a new medication since bp is acceptable but not optimal. We discussed family history of brother with CAD and will have patient seen by cardiology for stress testing. Patient denied any chest pain, shortness of breath or lower extremity edema.

## 2020-12-29 ENCOUNTER — TELEPHONE (OUTPATIENT)
Dept: FAMILY MEDICINE CLINIC | Age: 60
End: 2020-12-29

## 2020-12-29 RX ORDER — HYDROCHLOROTHIAZIDE 12.5 MG/1
12.5 CAPSULE, GELATIN COATED ORAL EVERY MORNING
Qty: 30 CAPSULE | Refills: 0 | Status: SHIPPED | OUTPATIENT
Start: 2020-12-29 | End: 2021-03-10

## 2020-12-29 NOTE — TELEPHONE ENCOUNTER
Spoke to patient to follow up on hypertension. I sent in the blood pressure medication and will have patient call in and report to the office to have blood pressure checked. Also upon review, did not follow up with his orthopedic surgeon as directed regarding his xray findings due to covid and scheduling difficulties.  He is already established with the group, however, I have placed a new referral. Please make sure he is seen as soon as possible

## 2020-12-30 NOTE — TELEPHONE ENCOUNTER
Clinic hours for Dr. MALCOLM Dacosta:  Monday 8 am - 2 pm   Tuesday 10 am - 7 pm  Wednesday 8 am - 2 pm  Thursday 8 am - 2 pm  Friday  8 am - 2 pm    If you need a refill on your prescription please call your pharmacy and let them know. Please be proactive and call before your medication runs out. The pharmacy will then contact us for the refill. Please allow 24-48 hours for the refill to be processed.     If your physician has ordered additional laboratory or radiology testing as part of your ongoing plan of care, please allow 7-10 business days from the day of your lab draw or test for the results to be sent and reviewed by your provider. If your results are critical and require more immediate intervention, you will be contacted sooner. Your results will be conveyed to you via a phone call or letter.    You may be receiving a patient satisfaction survey in the mail. Please take the time to complete, as your feedback is very important to us. We strive to make your experience exceptional and your comments help us with that goal. We look forward to hearing from you.       Scheduled pt for 01/06/2021 at 2:30 with [facility] to see Dr. Zach Renteria. Pt aware.

## 2021-01-19 ENCOUNTER — TELEPHONE (OUTPATIENT)
Dept: CARDIOLOGY CLINIC | Age: 61
End: 2021-01-19

## 2021-01-19 ENCOUNTER — OFFICE VISIT (OUTPATIENT)
Dept: CARDIOLOGY CLINIC | Age: 61
End: 2021-01-19
Payer: COMMERCIAL

## 2021-01-19 ENCOUNTER — TELEPHONE (OUTPATIENT)
Dept: FAMILY MEDICINE CLINIC | Age: 61
End: 2021-01-19

## 2021-01-19 VITALS
RESPIRATION RATE: 16 BRPM | HEIGHT: 67 IN | DIASTOLIC BLOOD PRESSURE: 88 MMHG | WEIGHT: 194 LBS | SYSTOLIC BLOOD PRESSURE: 132 MMHG | OXYGEN SATURATION: 98 % | BODY MASS INDEX: 30.45 KG/M2 | HEART RATE: 69 BPM

## 2021-01-19 DIAGNOSIS — I10 ESSENTIAL HYPERTENSION: Primary | ICD-10-CM

## 2021-01-19 DIAGNOSIS — I10 ACCELERATED HYPERTENSION: ICD-10-CM

## 2021-01-19 DIAGNOSIS — E78.5 DYSLIPIDEMIA: ICD-10-CM

## 2021-01-19 PROCEDURE — G8427 DOCREV CUR MEDS BY ELIG CLIN: HCPCS | Performed by: INTERNAL MEDICINE

## 2021-01-19 PROCEDURE — G8484 FLU IMMUNIZE NO ADMIN: HCPCS | Performed by: INTERNAL MEDICINE

## 2021-01-19 PROCEDURE — 3017F COLORECTAL CA SCREEN DOC REV: CPT | Performed by: INTERNAL MEDICINE

## 2021-01-19 PROCEDURE — G8417 CALC BMI ABV UP PARAM F/U: HCPCS | Performed by: INTERNAL MEDICINE

## 2021-01-19 PROCEDURE — 99204 OFFICE O/P NEW MOD 45 MIN: CPT | Performed by: INTERNAL MEDICINE

## 2021-01-19 PROCEDURE — 4004F PT TOBACCO SCREEN RCVD TLK: CPT | Performed by: INTERNAL MEDICINE

## 2021-01-19 ASSESSMENT — ENCOUNTER SYMPTOMS
WHEEZING: 0
RESPIRATORY NEGATIVE: 1
COUGH: 0
STRIDOR: 0
NAUSEA: 0
GASTROINTESTINAL NEGATIVE: 1
CHEST TIGHTNESS: 0
BLOOD IN STOOL: 0
SHORTNESS OF BREATH: 0
EYES NEGATIVE: 1

## 2021-01-19 NOTE — TELEPHONE ENCOUNTER
The patient made a call to schedule his testing you ordered today and was informed he would have 1000.00 dollar out of pocket expense. He does not qualify for financial assistance due to his income. Is there any one testing that would be more beneficial for you? He is not going to be able to do all of them. Thank you.

## 2021-01-19 NOTE — PROGRESS NOTES
Minutes per session: None    Stress: None   Relationships    Social connections     Talks on phone: None     Gets together: None     Attends Sabianism service: None     Active member of club or organization: None     Attends meetings of clubs or organizations: None     Relationship status: None    Intimate partner violence     Fear of current or ex partner: None     Emotionally abused: None     Physically abused: None     Forced sexual activity: None   Other Topics Concern    None   Social History Narrative    None       Allergies   Allergen Reactions    Morphine      Hallucinations      Lisinopril      cough       Current Outpatient Medications   Medication Sig Dispense Refill    hydroCHLOROthiazide (MICROZIDE) 12.5 MG capsule Take 1 capsule by mouth every morning 30 capsule 0    tadalafil (CIALIS) 10 MG tablet Take 1 tablet by mouth daily as needed for Erectile Dysfunction 10 tablet 0    losartan (COZAAR) 100 MG tablet Take 1 tablet by mouth daily 30 tablet 5    metoprolol succinate (TOPROL XL) 50 MG extended release tablet Take 1 tablet by mouth daily 90 tablet 0    aspirin 81 MG tablet Take 81 mg by mouth daily      amLODIPine (NORVASC) 10 MG tablet Take 1 tablet by mouth daily 90 tablet 0     No current facility-administered medications for this visit. Review of Systems:   Review of Systems   Constitutional: Negative. Negative for diaphoresis and fatigue. HENT: Negative. Eyes: Negative. Respiratory: Negative. Negative for cough, chest tightness, shortness of breath, wheezing and stridor. Cardiovascular: Negative. Negative for chest pain, palpitations and leg swelling. Gastrointestinal: Negative. Negative for blood in stool and nausea. Genitourinary: Negative. Musculoskeletal: Negative. Skin: Negative. Neurological: Negative. Negative for dizziness, syncope, weakness and light-headedness. Hematological: Negative. Psychiatric/Behavioral: Negative. Physical Examination:    /88 (Site: Left Upper Arm, Position: Sitting, Cuff Size: Medium Adult)   Pulse 69   Resp 16   Ht 5' 7\" (1.702 m)   Wt 194 lb (88 kg)   SpO2 98%   BMI 30.38 kg/m²    Physical Exam   Constitutional: He appears healthy. No distress. HENT:   Normal cephalic and Atraumatic   Eyes: Pupils are equal, round, and reactive to light. Neck: Normal range of motion and thyroid normal. Neck supple. No JVD present. No neck adenopathy. No thyromegaly present. Cardiovascular: Normal rate, regular rhythm, normal heart sounds, intact distal pulses and normal pulses. Pulmonary/Chest: Effort normal and breath sounds normal. He has no wheezes. He has no rales. He exhibits no tenderness. Abdominal: Soft. Bowel sounds are normal. There is no abdominal tenderness. Musculoskeletal: Normal range of motion. General: No tenderness or edema. Neurological: He is alert and oriented to person, place, and time. Skin: Skin is warm. No cyanosis. Nails show no clubbing.        LABS:  CBC:   Lab Results   Component Value Date    WBC 5.8 09/04/2020    RBC 5.04 09/04/2020    HGB 15.5 09/04/2020    HCT 46.5 09/04/2020    MCV 92.3 09/04/2020    MCH 30.7 09/04/2020    MCHC 33.3 09/04/2020    RDW 13.3 09/04/2020     09/04/2020    MPV 7.5 07/08/2015     Lipids:  Lab Results   Component Value Date    CHOL 197 09/04/2020    CHOL 196 09/21/2019    CHOL 225 (H) 07/17/2018     Lab Results   Component Value Date    TRIG 158 (H) 09/04/2020    TRIG 126 09/21/2019    TRIG 111 07/17/2018     Lab Results   Component Value Date    HDL 54 09/04/2020    HDL 55 09/21/2019    HDL 61 (H) 07/17/2018     Lab Results   Component Value Date    LDLCALC 111 09/04/2020    LDLCALC 116 09/21/2019    LDLCALC 142 (H) 07/17/2018     No results found for: LABVLDL, VLDL  Lab Results   Component Value Date    CHOLHDLRATIO 2.9 10/04/2013    CHOLHDLRATIO 3.6 02/15/2012     CMP:    Lab Results   Component Value Date  09/04/2020    K 4.1 09/04/2020     09/04/2020    CO2 24 09/04/2020    BUN 20 09/04/2020    CREATININE 0.64 09/04/2020    GFRAA >60.0 09/04/2020    LABGLOM >60.0 09/04/2020    GLUCOSE 82 09/04/2020    GLUCOSE 105 02/15/2012    PROT 6.9 09/04/2020    LABALBU 4.1 09/04/2020    LABALBU 4.5 02/15/2012    CALCIUM 8.9 09/04/2020    BILITOT 0.6 09/04/2020    ALKPHOS 85 09/04/2020    AST 21 09/04/2020    ALT 16 09/04/2020     BMP:    Lab Results   Component Value Date     09/04/2020    K 4.1 09/04/2020     09/04/2020    CO2 24 09/04/2020    BUN 20 09/04/2020    LABALBU 4.1 09/04/2020    LABALBU 4.5 02/15/2012    CREATININE 0.64 09/04/2020    CALCIUM 8.9 09/04/2020    GFRAA >60.0 09/04/2020    LABGLOM >60.0 09/04/2020    GLUCOSE 82 09/04/2020    GLUCOSE 105 02/15/2012     Magnesium:  No results found for: MG  TSH:No results found for: TSHFT4, TSH          Patient Active Problem List   Diagnosis    Osteoarthritis    Anxiety    HTN (hypertension)    Dyslipidemia    Drug-induced erectile dysfunction    S/P lumbar microdiscectomy    Herniated lumbar intervertebral disc    Adenomatous polyp of sigmoid colon    Adenomatous polyp of ascending colon       There are no discontinued medications. Modified Medications    No medications on file       No orders of the defined types were placed in this encounter. Assessment/Plan:    1. Essential hypertension     - CARDIAC STRESS TEST EXERCISE ONLY; Future  - ECHO Complete 2D W Doppler W Color; Future    2. Dyslipidemia       3. Accelerated hypertension     - US DUP ABD PEL RETRO SCROT COMPLETE; Future       Counseling:  Heart Healthy Lifestyle, Low Salt Diet, Take Precautions to Prevent Falls and Walk Daily    Return for AFTER TESTS.     Electronically signed by Kinga Frank MD on 1/19/2021 at 3:50 PM

## 2021-01-19 NOTE — TELEPHONE ENCOUNTER
Testing for Dr. Carlos Jameson was going to be 1,000 out of pocket. Ultrasound of heart, stress test and something for the kidneys. Someone already reached out to Dr. Carlos Jameson to see if there is something else he can do. Pt  just wanted to let you know.

## 2021-01-26 NOTE — TELEPHONE ENCOUNTER
Dr. Arslan Paul said out of the three tests he should get kidney ultrasound done and it is scheduled for saturday at THE [facility] lab. Been taking BP almost every day pulse rate 65 BP: 118/77. Lower range for the last week, week in half.

## 2021-01-27 DIAGNOSIS — I10 HYPERTENSION, UNSPECIFIED TYPE: Primary | ICD-10-CM

## 2021-01-27 NOTE — TELEPHONE ENCOUNTER
Blood pressure seems to be within acceptable ranges. Please let me know if there is anything I can do to assist the patient.   Also make sure patient has BMP drawn (order placed) as a new medication was started that can affect electrolytes and kidney function

## 2021-01-28 NOTE — TELEPHONE ENCOUNTER
Kidney ultra sound will be done  this Saturday and pt stated he will wait for that result to come back before he does the blood work.

## 2021-01-29 NOTE — TELEPHONE ENCOUNTER
Just to clarify.  The blood work is because of the new medication that he is on and is not directly related to the ultrasound

## 2021-01-30 ENCOUNTER — HOSPITAL ENCOUNTER (OUTPATIENT)
Dept: ULTRASOUND IMAGING | Age: 61
Discharge: HOME | End: 2021-02-01
Payer: COMMERCIAL

## 2021-01-30 DIAGNOSIS — I10 ACCELERATED HYPERTENSION: ICD-10-CM

## 2021-01-30 PROCEDURE — 93975 VASCULAR STUDY: CPT

## 2021-02-02 PROCEDURE — 93975 VASCULAR STUDY: CPT | Performed by: INTERNAL MEDICINE

## 2021-02-03 ENCOUNTER — TELEPHONE (OUTPATIENT)
Dept: FAMILY MEDICINE CLINIC | Age: 61
End: 2021-02-03

## 2021-02-04 NOTE — TELEPHONE ENCOUNTER
Pt states that his labs are done  every 6 months. He wants PSA screening, Lipid panel, CMP and CBC done with what else you ordered to get it all out of the way and in one shot. He has an appt in march to go over labs and results. Please advise.

## 2021-02-05 DIAGNOSIS — I10 HYPERTENSION, UNSPECIFIED TYPE: Primary | ICD-10-CM

## 2021-02-05 DIAGNOSIS — Z12.5 PROSTATE CANCER SCREENING: ICD-10-CM

## 2021-02-05 DIAGNOSIS — E78.5 DYSLIPIDEMIA: ICD-10-CM

## 2021-02-06 ENCOUNTER — HOSPITAL ENCOUNTER (OUTPATIENT)
Dept: LAB | Age: 61
Discharge: HOME | End: 2021-02-06
Payer: COMMERCIAL

## 2021-02-06 DIAGNOSIS — E78.5 DYSLIPIDEMIA: ICD-10-CM

## 2021-02-06 DIAGNOSIS — I10 HYPERTENSION, UNSPECIFIED TYPE: ICD-10-CM

## 2021-02-06 DIAGNOSIS — Z12.5 PROSTATE CANCER SCREENING: ICD-10-CM

## 2021-02-06 LAB
ALBUMIN SERPL-MCNC: 3.9 G/DL (ref 3.5–4.6)
ALP BLD-CCNC: 87 U/L (ref 35–104)
ALT SERPL-CCNC: 16 U/L (ref 0–41)
ANION GAP SERPL CALCULATED.3IONS-SCNC: 8 MEQ/L (ref 9–15)
AST SERPL-CCNC: 18 U/L (ref 0–40)
BASOPHILS ABSOLUTE: 0.1 K/UL (ref 0–0.2)
BASOPHILS RELATIVE PERCENT: 0.9 %
BILIRUB SERPL-MCNC: 1.1 MG/DL (ref 0.2–0.7)
BUN BLDV-MCNC: 19 MG/DL (ref 6–20)
CALCIUM SERPL-MCNC: 9.4 MG/DL (ref 8.5–9.9)
CHLORIDE BLD-SCNC: 106 MEQ/L (ref 95–107)
CHOLESTEROL, TOTAL: 185 MG/DL (ref 0–199)
CO2: 25 MEQ/L (ref 20–31)
CREAT SERPL-MCNC: 0.78 MG/DL (ref 0.7–1.2)
EOSINOPHILS ABSOLUTE: 0.3 K/UL (ref 0–0.7)
EOSINOPHILS RELATIVE PERCENT: 4.3 %
GFR AFRICAN AMERICAN: >60
GFR NON-AFRICAN AMERICAN: >60
GLOBULIN: 3 G/DL (ref 2.3–3.5)
GLUCOSE BLD-MCNC: 98 MG/DL (ref 70–99)
HCT VFR BLD CALC: 47.7 % (ref 42–52)
HDLC SERPL-MCNC: 43 MG/DL (ref 40–59)
HEMOGLOBIN: 15.8 G/DL (ref 14–18)
LDL CHOLESTEROL CALCULATED: 122 MG/DL (ref 0–129)
LYMPHOCYTES ABSOLUTE: 1.9 K/UL (ref 1–4.8)
LYMPHOCYTES RELATIVE PERCENT: 27.2 %
MCH RBC QN AUTO: 30 PG (ref 27–31.3)
MCHC RBC AUTO-ENTMCNC: 33 % (ref 33–37)
MCV RBC AUTO: 90.8 FL (ref 80–100)
MONOCYTES ABSOLUTE: 0.8 K/UL (ref 0.2–0.8)
MONOCYTES RELATIVE PERCENT: 11.1 %
NEUTROPHILS ABSOLUTE: 3.9 K/UL (ref 1.4–6.5)
NEUTROPHILS RELATIVE PERCENT: 56.5 %
PDW BLD-RTO: 13.1 % (ref 11.5–14.5)
PLATELET # BLD: 287 K/UL (ref 130–400)
POTASSIUM SERPL-SCNC: 4.5 MEQ/L (ref 3.4–4.9)
PROSTATE SPECIFIC ANTIGEN: 1.27 NG/ML (ref 0–3.89)
RBC # BLD: 5.26 M/UL (ref 4.7–6.1)
SODIUM BLD-SCNC: 139 MEQ/L (ref 135–144)
TOTAL PROTEIN: 6.9 G/DL (ref 6.3–8)
TRIGL SERPL-MCNC: 102 MG/DL (ref 0–150)
WBC # BLD: 6.8 K/UL (ref 4.8–10.8)

## 2021-02-06 PROCEDURE — 36415 COLL VENOUS BLD VENIPUNCTURE: CPT

## 2021-02-06 PROCEDURE — 80061 LIPID PANEL: CPT

## 2021-02-06 PROCEDURE — 80053 COMPREHEN METABOLIC PANEL: CPT

## 2021-02-06 PROCEDURE — G0103 PSA SCREENING: HCPCS

## 2021-02-06 PROCEDURE — 85025 COMPLETE CBC W/AUTO DIFF WBC: CPT

## 2021-02-08 ENCOUNTER — TELEPHONE (OUTPATIENT)
Dept: FAMILY MEDICINE CLINIC | Age: 61
End: 2021-02-08

## 2021-02-08 DIAGNOSIS — R17 ELEVATED BILIRUBIN: Primary | ICD-10-CM

## 2021-02-22 ENCOUNTER — TELEPHONE (OUTPATIENT)
Dept: FAMILY MEDICINE CLINIC | Age: 61
End: 2021-02-22

## 2021-02-23 NOTE — TELEPHONE ENCOUNTER
Patient's blood pressure is within normal ranges.   Please have him follow-up to make sure bilirubin is rechecked his labs have already been ordered

## 2021-02-27 ENCOUNTER — HOSPITAL ENCOUNTER (OUTPATIENT)
Dept: LAB | Age: 61
Discharge: HOME | End: 2021-02-27
Payer: COMMERCIAL

## 2021-02-27 DIAGNOSIS — R17 ELEVATED BILIRUBIN: ICD-10-CM

## 2021-02-27 LAB
BILIRUB SERPL-MCNC: 1 MG/DL (ref 0.2–0.7)
BILIRUBIN DIRECT: <0.2 MG/DL (ref 0–0.4)
BILIRUBIN, INDIRECT: ABNORMAL MG/DL (ref 0–0.6)

## 2021-02-27 PROCEDURE — 36415 COLL VENOUS BLD VENIPUNCTURE: CPT

## 2021-02-27 PROCEDURE — 82248 BILIRUBIN DIRECT: CPT

## 2021-02-27 PROCEDURE — 82247 BILIRUBIN TOTAL: CPT

## 2021-03-09 DIAGNOSIS — N52.9 ERECTILE DYSFUNCTION, UNSPECIFIED ERECTILE DYSFUNCTION TYPE: ICD-10-CM

## 2021-03-10 ENCOUNTER — OFFICE VISIT (OUTPATIENT)
Dept: FAMILY MEDICINE CLINIC | Age: 61
End: 2021-03-10
Payer: COMMERCIAL

## 2021-03-10 VITALS
WEIGHT: 193 LBS | BODY MASS INDEX: 30.29 KG/M2 | HEART RATE: 68 BPM | SYSTOLIC BLOOD PRESSURE: 128 MMHG | HEIGHT: 67 IN | OXYGEN SATURATION: 98 % | DIASTOLIC BLOOD PRESSURE: 78 MMHG

## 2021-03-10 DIAGNOSIS — I10 HYPERTENSION, UNSPECIFIED TYPE: ICD-10-CM

## 2021-03-10 DIAGNOSIS — R17 ELEVATED BILIRUBIN: Primary | ICD-10-CM

## 2021-03-10 PROCEDURE — 99213 OFFICE O/P EST LOW 20 MIN: CPT | Performed by: FAMILY MEDICINE

## 2021-03-10 PROCEDURE — 3017F COLORECTAL CA SCREEN DOC REV: CPT | Performed by: FAMILY MEDICINE

## 2021-03-10 PROCEDURE — 4004F PT TOBACCO SCREEN RCVD TLK: CPT | Performed by: FAMILY MEDICINE

## 2021-03-10 PROCEDURE — G8417 CALC BMI ABV UP PARAM F/U: HCPCS | Performed by: FAMILY MEDICINE

## 2021-03-10 PROCEDURE — G8427 DOCREV CUR MEDS BY ELIG CLIN: HCPCS | Performed by: FAMILY MEDICINE

## 2021-03-10 PROCEDURE — G8484 FLU IMMUNIZE NO ADMIN: HCPCS | Performed by: FAMILY MEDICINE

## 2021-03-10 RX ORDER — TADALAFIL 10 MG/1
10 TABLET ORAL DAILY PRN
Qty: 10 TABLET | Refills: 0 | Status: SHIPPED | OUTPATIENT
Start: 2021-03-10

## 2021-03-10 ASSESSMENT — PATIENT HEALTH QUESTIONNAIRE - PHQ9
SUM OF ALL RESPONSES TO PHQ QUESTIONS 1-9: 0
1. LITTLE INTEREST OR PLEASURE IN DOING THINGS: 0
2. FEELING DOWN, DEPRESSED OR HOPELESS: 0
SUM OF ALL RESPONSES TO PHQ QUESTIONS 1-9: 0

## 2021-03-10 NOTE — PROGRESS NOTES
Subjective:      Patient ID: Baldemar Laws is a 64 y.o. male who presents today for:     Chief Complaint   Patient presents with    1 Month Follow-Up     f/u labs       HPI  Patient is a very pleasant 57-year-old male with a history of hypertension presents today to follow-up on chronic issues. He has stopped his hydrochlorothiazide over the past month and noted stabilization of his blood pressures. He has also made intervention of abstaining from alcohol and cigars, and noticed a difference. Of note there has been mild elevation in bilirubin levels on patient's recent labs. Patient denies any abdominal pain, diarrhea, constipation, nausea no change in stool. Past Medical History:   Diagnosis Date    Anxiety     Hypertension     Osteoarthritis      Past Surgical History:   Procedure Laterality Date    CERVICAL [address] SURGERY  4/23/13    DR. PIZARRO     COLONOSCOPY  6/13/14     [facility]    COLONOSCOPY N/A 8/9/2019    COLORECTAL CANCER SCREENING, HIGH RISK performed by Yeison Delgadillo MD at [address]  2004    JOINT REPLACEMENT      RTHR    SPINE SURGERY  06/30/2016    lumbar    TOTAL HIP ARTHROPLASTY Right 12/2009     Family History   Problem Relation Age of Onset    COPD Mother     Stroke Father     High Blood Pressure Brother      Social History     Socioeconomic History    Marital status:      Spouse name: Not on file    Number of children: Not on file    Years of education: Not on file    Highest education level: Not on file   Occupational History    Not on file   Social Needs    Financial resource strain: Not on file    Food insecurity     Worry: Not on file     Inability: Not on file   [employer] needs     Medical: Not on file     Non-medical: Not on file   Tobacco Use    Smoking status: Current Every Day Smoker     Packs/day: 0.10     Years: 18.00     Pack years: 1.80     Types: Cigars     Start date: 2000    Smokeless tobacco: Never Used    Tobacco comment: 1-2 per day   Substance and Sexual Activity    Alcohol use: Yes     Alcohol/week: 0.0 standard drinks     Comment: 21 beers a week, 2 shots of liquor a week     Drug use: No    Sexual activity: Not on file   Lifestyle    Physical activity     Days per week: Not on file     Minutes per session: Not on file    Stress: Not on file   Relationships    Social connections     Talks on phone: Not on file     Gets together: Not on file     Attends Congregational service: Not on file     Active member of club or organization: Not on file     Attends meetings of clubs or organizations: Not on file     Relationship status: Not on file    Intimate partner violence     Fear of current or ex partner: Not on file     Emotionally abused: Not on file     Physically abused: Not on file     Forced sexual activity: Not on file   Other Topics Concern    Not on file   Social History Narrative    Not on file     Current Outpatient Medications on File Prior to Visit   Medication Sig Dispense Refill    tadalafil (CIALIS) 10 MG tablet Take 1 tablet by mouth daily as needed for Erectile Dysfunction 10 tablet 0    amLODIPine (NORVASC) 10 MG tablet Take 1 tablet by mouth daily 90 tablet 0    losartan (COZAAR) 100 MG tablet Take 1 tablet by mouth daily 30 tablet 5    metoprolol succinate (TOPROL XL) 50 MG extended release tablet Take 1 tablet by mouth daily 90 tablet 0    aspirin 81 MG tablet Take 81 mg by mouth daily       No current facility-administered medications on file prior to visit. Allergies:  Morphine and Lisinopril    Review of Systems   Constitutional: Negative for activity change, appetite change and fatigue. Respiratory: Negative for apnea, cough, chest tightness and shortness of breath. Cardiovascular: Negative for chest pain, palpitations and leg swelling. Gastrointestinal: Negative for abdominal pain, blood in stool, constipation, diarrhea, nausea and vomiting.    Musculoskeletal: Negative for arthralgias. Neurological: Negative for seizures and headaches. Psychiatric/Behavioral: Negative for hallucinations and suicidal ideas. Objective:   /78 (Site: Right Upper Arm, Position: Sitting, Cuff Size: Medium Adult)   Pulse 68   Ht 5' 7\" (1.702 m)   Wt 193 lb (87.5 kg)   SpO2 98%   BMI 30.23 kg/m²     Physical Exam  Vitals signs and nursing note reviewed. Constitutional:       General: He is not in acute distress. Appearance: Normal appearance. He is well-developed. He is not diaphoretic. HENT:      Head: Normocephalic and atraumatic. Nose: Nose normal.      Mouth/Throat:      Mouth: Mucous membranes are moist.      Pharynx: Oropharynx is clear. Eyes:      Conjunctiva/sclera: Conjunctivae normal.      Pupils: Pupils are equal, round, and reactive to light. Neck:      Musculoskeletal: Normal range of motion. Cardiovascular:      Rate and Rhythm: Normal rate and regular rhythm. Heart sounds: Normal heart sounds. No murmur. No friction rub. No gallop. Pulmonary:      Effort: Pulmonary effort is normal. No respiratory distress. Breath sounds: Normal breath sounds. No wheezing or rales. Chest:      Chest wall: No tenderness. Abdominal:      General: Abdomen is flat. Bowel sounds are normal.      Palpations: Abdomen is soft. Tenderness: There is no abdominal tenderness. Skin:     General: Skin is warm and dry. Neurological:      Mental Status: He is alert and oriented to person, place, and time. Psychiatric:         Behavior: Behavior normal.         Thought Content: Thought content normal.         Judgment: Judgment normal.         Assessment & Plan:     1. Elevated bilirubin  We will recheck CMP in 3 months per patient request and obtain a right upper quadrant ultrasound  - US GALLBLADDER RUQ; Future  - Comprehensive Metabolic Panel; Future    2.  Hypertension, unspecified type  Controlled: Continue current medication      Return in about 3 months (around 6/10/2021).     Beth Bustos MD

## 2021-03-13 ASSESSMENT — ENCOUNTER SYMPTOMS
COUGH: 0
ABDOMINAL PAIN: 0
NAUSEA: 0
CONSTIPATION: 0
APNEA: 0
DIARRHEA: 0
CHEST TIGHTNESS: 0
SHORTNESS OF BREATH: 0
BLOOD IN STOOL: 0
VOMITING: 0

## 2021-03-14 DIAGNOSIS — I10 ESSENTIAL HYPERTENSION: ICD-10-CM

## 2021-03-15 RX ORDER — AMLODIPINE BESYLATE 10 MG/1
10 TABLET ORAL DAILY
Qty: 90 TABLET | Refills: 0 | Status: SHIPPED | OUTPATIENT
Start: 2021-03-15 | End: 2021-07-08

## 2021-03-15 RX ORDER — METOPROLOL SUCCINATE 50 MG/1
50 TABLET, EXTENDED RELEASE ORAL DAILY
Qty: 90 TABLET | Refills: 0 | Status: SHIPPED | OUTPATIENT
Start: 2021-03-15 | End: 2021-07-08

## 2021-03-15 NOTE — TELEPHONE ENCOUNTER
Pharmacy is requesting medication refill.  Please approve or deny this request.    Rx requested:  Requested Prescriptions     Pending Prescriptions Disp Refills    metoprolol succinate (TOPROL XL) 50 MG extended release tablet [Pharmacy Med Name: metoprolol succinate ER 50 mg tablet,extended release 24 hr] 90 tablet 0     Sig: Take 1 tablet by mouth daily    amLODIPine (NORVASC) 10 MG tablet [Pharmacy Med Name: amlodipine 10 mg tablet] 90 tablet 0     Sig: Take 1 tablet by mouth daily         Last Office Visit:   3/10/2021      Next Visit Date:  Future Appointments   Date Time Provider [address]   3/20/2021  8:00 AM [facility] ULTRASOUND ROOM 1 [facility] RAD

## 2021-03-20 ENCOUNTER — HOSPITAL ENCOUNTER (OUTPATIENT)
Dept: ULTRASOUND IMAGING | Age: 61
Discharge: HOME | End: 2021-03-22
Payer: COMMERCIAL

## 2021-03-20 DIAGNOSIS — R17 ELEVATED BILIRUBIN: ICD-10-CM

## 2021-03-20 PROCEDURE — 76705 ECHO EXAM OF ABDOMEN: CPT

## 2021-03-25 PROBLEM — K76.89 BENIGN LIVER CYST: Chronic | Status: ACTIVE | Noted: 2021-03-25

## 2021-03-25 NOTE — RESULT ENCOUNTER NOTE
Please call patient to inform him that recent ultrasound shows normal-appearing gallbladder and normal-appearing liver with a benign cyst that requires no further management. His common bile duct is normal in diameter with no suggestions of obstruction/dilatation.   No further imaging is indicated

## 2021-05-18 ENCOUNTER — VIRTUAL VISIT (OUTPATIENT)
Dept: FAMILY MEDICINE CLINIC | Age: 61
End: 2021-05-18
Payer: COMMERCIAL

## 2021-05-18 ENCOUNTER — HOSPITAL ENCOUNTER (OUTPATIENT)
Dept: LAB | Age: 61
Discharge: HOME | End: 2021-05-18
Payer: COMMERCIAL

## 2021-05-18 DIAGNOSIS — M13.0 POLYARTHRITIS: Primary | ICD-10-CM

## 2021-05-18 DIAGNOSIS — R53.83 FATIGUE, UNSPECIFIED TYPE: ICD-10-CM

## 2021-05-18 DIAGNOSIS — M13.0 POLYARTHRITIS: ICD-10-CM

## 2021-05-18 LAB
ALBUMIN SERPL-MCNC: 4 G/DL (ref 3.5–4.6)
ALP BLD-CCNC: 92 U/L (ref 35–104)
ALT SERPL-CCNC: 16 U/L (ref 0–41)
ANION GAP SERPL CALCULATED.3IONS-SCNC: 12 MEQ/L (ref 9–15)
AST SERPL-CCNC: 15 U/L (ref 0–40)
BASOPHILS ABSOLUTE: 0.1 K/UL (ref 0–0.2)
BASOPHILS RELATIVE PERCENT: 1 %
BILIRUB SERPL-MCNC: 0.8 MG/DL (ref 0.2–0.7)
BUN BLDV-MCNC: 20 MG/DL (ref 6–20)
C-REACTIVE PROTEIN: 1.9 MG/L (ref 0–5)
CALCIUM SERPL-MCNC: 9 MG/DL (ref 8.5–9.9)
CHLORIDE BLD-SCNC: 103 MEQ/L (ref 95–107)
CO2: 23 MEQ/L (ref 20–31)
CREAT SERPL-MCNC: 0.7 MG/DL (ref 0.7–1.2)
EOSINOPHILS ABSOLUTE: 0.3 K/UL (ref 0–0.7)
EOSINOPHILS RELATIVE PERCENT: 3.6 %
FOLATE: 18.2 NG/ML (ref 7.3–26.1)
GFR AFRICAN AMERICAN: >60
GFR NON-AFRICAN AMERICAN: >60
GLOBULIN: 2.7 G/DL (ref 2.3–3.5)
GLUCOSE BLD-MCNC: 126 MG/DL (ref 70–99)
HCT VFR BLD CALC: 45.6 % (ref 42–52)
HEMOGLOBIN: 15.1 G/DL (ref 14–18)
LYMPHOCYTES ABSOLUTE: 2.2 K/UL (ref 1–4.8)
LYMPHOCYTES RELATIVE PERCENT: 25.2 %
MCH RBC QN AUTO: 28.6 PG (ref 27–31.3)
MCHC RBC AUTO-ENTMCNC: 33.1 % (ref 33–37)
MCV RBC AUTO: 86.5 FL (ref 80–100)
MONOCYTES ABSOLUTE: 0.7 K/UL (ref 0.2–0.8)
MONOCYTES RELATIVE PERCENT: 8.7 %
NEUTROPHILS ABSOLUTE: 5.3 K/UL (ref 1.4–6.5)
NEUTROPHILS RELATIVE PERCENT: 61.5 %
PDW BLD-RTO: 12.9 % (ref 11.5–14.5)
PLATELET # BLD: 266 K/UL (ref 130–400)
POTASSIUM SERPL-SCNC: 4 MEQ/L (ref 3.4–4.9)
RBC # BLD: 5.27 M/UL (ref 4.7–6.1)
RHEUMATOID FACTOR: <10 IU/ML (ref 0–14)
SEDIMENTATION RATE, ERYTHROCYTE: 8 MM (ref 0–20)
SODIUM BLD-SCNC: 138 MEQ/L (ref 135–144)
TOTAL PROTEIN: 6.7 G/DL (ref 6.3–8)
TSH REFLEX: 0.84 UIU/ML (ref 0.44–3.86)
VITAMIN B-12: 624 PG/ML (ref 232–1245)
WBC # BLD: 8.5 K/UL (ref 4.8–10.8)

## 2021-05-18 PROCEDURE — 82607 VITAMIN B-12: CPT

## 2021-05-18 PROCEDURE — 36415 COLL VENOUS BLD VENIPUNCTURE: CPT

## 2021-05-18 PROCEDURE — 99442 PR PHYS/QHP TELEPHONE EVALUATION 11-20 MIN: CPT | Performed by: FAMILY MEDICINE

## 2021-05-18 PROCEDURE — 85652 RBC SED RATE AUTOMATED: CPT

## 2021-05-18 PROCEDURE — 82746 ASSAY OF FOLIC ACID SERUM: CPT

## 2021-05-18 PROCEDURE — 86618 LYME DISEASE ANTIBODY: CPT

## 2021-05-18 PROCEDURE — 86140 C-REACTIVE PROTEIN: CPT

## 2021-05-18 PROCEDURE — 86431 RHEUMATOID FACTOR QUANT: CPT

## 2021-05-18 PROCEDURE — 84443 ASSAY THYROID STIM HORMONE: CPT

## 2021-05-18 PROCEDURE — 80053 COMPREHEN METABOLIC PANEL: CPT

## 2021-05-18 PROCEDURE — 85025 COMPLETE CBC W/AUTO DIFF WBC: CPT

## 2021-05-18 ASSESSMENT — ENCOUNTER SYMPTOMS
NAUSEA: 0
COUGH: 0
ABDOMINAL PAIN: 0
BLOOD IN STOOL: 0
APNEA: 0
CONSTIPATION: 0
CHEST TIGHTNESS: 0
DIARRHEA: 0
SHORTNESS OF BREATH: 0
VOMITING: 0

## 2021-05-18 NOTE — PROGRESS NOTES
2021    TELEHEALTH EVALUATION -- Audio/Visual (During OYNFC-97 public health emergency)    Due to [place] 19 outbreak, patient's office visit was converted to a virtual visit. Patient was contacted and agreed to proceed with a virtual visit via Telephone Visit  The risks and benefits of converting to a virtual visit were discussed in light of the current infectious disease epidemic. Patient also understood that insurance coverage and co-pays are up to their individual insurance plans. Patient location: Home  Provider location: [place] office  HPI:    Mehdi Ye (:  1960) has requested an audio/video evaluation for the following concern(s):    Patient is a very pleasant 77-year-old female presents today due to progressive fatigue and arthralgias over the past 2 to 3 weeks. Patient is concerned about Lyme disease as he has most of the symptoms. He denies pain bitten by a tick but states that there have been multiple ticks on his children and on the dog. He denies any cough, fever, chills, weight loss, abdominal pain. He states that most of his joints are more painful than they typically are and he finds himself significantly fatigued making it easy for him to fall asleep during the day. He denies any sick contacts or recent travel. Review of Systems   Constitutional: Positive for fatigue. Negative for activity change, appetite change and fever. Respiratory: Negative for apnea, cough, chest tightness and shortness of breath. Cardiovascular: Negative for chest pain, palpitations and leg swelling. Gastrointestinal: Negative for abdominal pain, blood in stool, constipation, diarrhea, nausea and vomiting. Musculoskeletal: Positive for arthralgias and joint swelling. Neurological: Negative for seizures and headaches. Hematological: Negative for adenopathy. Psychiatric/Behavioral: Negative for hallucinations and suicidal ideas.        Prior to Visit Medications    Medication Sig Return if symptoms worsen or fail to improve. 11-20 minutes were spent on the digital evaluation and management of this patient. An  electronic signature was used to authenticate this note.    --Ashley Tay MD on 5/20/2021 at 1:34 PM    [id number]}    Pursuant to the emergency declaration under the [address] [place] [address] [facility] authority and the [employer] and [employer] Act, this Virtual  Visit was conducted, with patient's consent, to reduce the patient's risk of exposure to COVID-19 and provide continuity of care for an established patient. Services were provided through a video synchronous discussion virtually to substitute for in-person clinic visit.

## 2021-05-19 ENCOUNTER — TELEPHONE (OUTPATIENT)
Dept: FAMILY MEDICINE CLINIC | Age: 61
End: 2021-05-19

## 2021-05-19 NOTE — TELEPHONE ENCOUNTER
Labs were only obtained yesterday and are still incomplete. Thus far, there is nothing concerning.   Lyme disease panel is still pending

## 2021-05-20 ENCOUNTER — TELEPHONE (OUTPATIENT)
Dept: FAMILY MEDICINE CLINIC | Age: 61
End: 2021-05-20

## 2021-05-20 DIAGNOSIS — R73.9 HYPERGLYCEMIA: Primary | ICD-10-CM

## 2021-05-20 LAB — LYME, EIA: 0.27 LIV (ref 0–1.2)

## 2021-05-20 NOTE — TELEPHONE ENCOUNTER
Pt stopped by the office to get results for blood work done on 5/18/2021. Pt states that the blood work is contingent further treatment, pt is missing work and would like answers.

## 2021-05-20 NOTE — TELEPHONE ENCOUNTER
Pt made aware. Pt reports he still has been feeling fatigued and weakness. He has been feeling like this for weeks and does not think its okay that we are not following up on this problem. I went and spoke to Dr. Pratik Hastings and he stated that he will call pt today before he leaves BUT he needs an in person appointment. I scheduled an appointment for next Tuesday at 36 for IN person and pt is made aware.      FYI to PCP

## 2021-05-21 NOTE — TELEPHONE ENCOUNTER
Pt made aware. Will come in to walk in tomorrow if symptoms worsen from now until then he agreed to go to ED.

## 2021-07-05 DIAGNOSIS — I10 ESSENTIAL HYPERTENSION: ICD-10-CM

## 2021-07-05 NOTE — TELEPHONE ENCOUNTER
Requesting medication refill. Please approve or deny this request.    Rx requested:  Requested Prescriptions     Pending Prescriptions Disp Refills    metoprolol succinate (TOPROL XL) 50 MG extended release tablet [Pharmacy Med Name: metoprolol succinate ER 50 mg tablet,extended release 24 hr] 90 tablet 0     Sig: TAKE 1 TABLET BY MOUTH EVERY DAY    amLODIPine (NORVASC) 10 MG tablet [Pharmacy Med Name: amlodipine 10 mg tablet] 90 tablet 0     Sig: TAKE 1 TABLET BY MOUTH EVERY DAY       Last Office Visit:   5/18/2021    Last Filled:      Last Labs:      Next Visit Date:  No future appointments.

## 2021-07-08 RX ORDER — METOPROLOL SUCCINATE 50 MG/1
TABLET, EXTENDED RELEASE ORAL
Qty: 90 TABLET | Refills: 0 | Status: SHIPPED | OUTPATIENT
Start: 2021-07-08

## 2021-07-08 RX ORDER — AMLODIPINE BESYLATE 10 MG/1
TABLET ORAL
Qty: 90 TABLET | Refills: 0 | Status: SHIPPED | OUTPATIENT
Start: 2021-07-08

## 2021-09-03 ENCOUNTER — HOSPITAL ENCOUNTER (OUTPATIENT)
Dept: MRI IMAGING | Age: 61
Discharge: HOME | End: 2021-09-05
Payer: COMMERCIAL

## 2021-09-03 DIAGNOSIS — M47.816 OSTEOARTHRITIS OF LUMBAR SPINE, UNSPECIFIED SPINAL OSTEOARTHRITIS COMPLICATION STATUS: ICD-10-CM

## 2021-11-02 ENCOUNTER — INITIAL CONSULT (OUTPATIENT)
Dept: PAIN MANAGEMENT | Age: 61
End: 2021-11-02
Payer: COMMERCIAL

## 2021-11-02 VITALS
HEIGHT: 67 IN | WEIGHT: 185 LBS | TEMPERATURE: 97 F | BODY MASS INDEX: 29.03 KG/M2 | SYSTOLIC BLOOD PRESSURE: 128 MMHG | DIASTOLIC BLOOD PRESSURE: 68 MMHG

## 2021-11-02 DIAGNOSIS — M54.16 LUMBAR RADICULOPATHY: Primary | ICD-10-CM

## 2021-11-02 DIAGNOSIS — M51.36 DDD (DEGENERATIVE DISC DISEASE), LUMBAR: ICD-10-CM

## 2021-11-02 DIAGNOSIS — M47.817 LUMBOSACRAL SPONDYLOSIS WITHOUT MYELOPATHY: ICD-10-CM

## 2021-11-02 PROCEDURE — G8484 FLU IMMUNIZE NO ADMIN: HCPCS | Performed by: ANESTHESIOLOGY

## 2021-11-02 PROCEDURE — 99243 OFF/OP CNSLTJ NEW/EST LOW 30: CPT | Performed by: ANESTHESIOLOGY

## 2021-11-02 PROCEDURE — G8417 CALC BMI ABV UP PARAM F/U: HCPCS | Performed by: ANESTHESIOLOGY

## 2021-11-02 PROCEDURE — G8427 DOCREV CUR MEDS BY ELIG CLIN: HCPCS | Performed by: ANESTHESIOLOGY

## 2021-11-02 RX ORDER — GABAPENTIN 300 MG/1
300 CAPSULE ORAL 2 TIMES DAILY
Qty: 60 CAPSULE | Refills: 2 | Status: SHIPPED | OUTPATIENT
Start: 2021-11-02 | End: 2021-12-02

## 2021-11-02 ASSESSMENT — ENCOUNTER SYMPTOMS
SHORTNESS OF BREATH: 0
CONSTIPATION: 0
NAUSEA: 0
BACK PAIN: 1
DIARRHEA: 0

## 2021-11-02 NOTE — PROGRESS NOTES
Patient:  Tammie Flores  YOB: 1960  Date: 11/2/2021      Subjective:     Tammie Flores is a 63 y.o. male who presents today with:    Chief Complaint   Patient presents with    Back Pain     Lumbar    Hip Pain     Left hip        HPI: The patient presents to the clinic for an evaluation and treatment of a chronic left-sided low back pain radiating down the left groin. The patient is a history of a ruptured lumbar disc and underwent lumbar laminectomy in 2016 by Dr. Max Marie. He has done very well until January of this year when he started having a left-sided low back pain radiating down the left groin with some mild foot drop. Pain has been severe at times and lasts long. A month long pain got improved overnight after the Second Covid 19 vaccination by Richard Langley. This could be a coincidence. Allergies:  Morphine and Lisinopril  Past Medical History:   Diagnosis Date    Anxiety     Benign liver cyst 3/25/2021    Left hepatic cyst on 03/2021 [facility]    Hypertension     Osteoarthritis      Past Surgical History:   Procedure Laterality Date    CERVICAL [address] SURGERY  4/23/13    DR. PIZARRO     COLONOSCOPY  6/13/14    DR Kamilah Campbell    COLONOSCOPY N/A 8/9/2019    COLORECTAL CANCER SCREENING, HIGH RISK performed by Arvind Redmond MD at [address]  2004    JOINT REPLACEMENT      RTHR    SPINE SURGERY  06/30/2016    lumbar    TOTAL HIP ARTHROPLASTY Right 12/2009     Social History     Socioeconomic History    Marital status:      Spouse name: Not on file    Number of children: Not on file    Years of education: Not on file    Highest education level: Not on file   Occupational History    Not on file   Tobacco Use    Smoking status: Current Every Day Smoker     Packs/day: 0.10     Years: 18.00     Pack years: 1.80     Types: Cigars     Start date: 2000    Smokeless tobacco: Never Used    Tobacco comment: 1-2 per day   Substance and Sexual Activity    Alcohol use: Yes     Alcohol/week: 0.0 standard drinks     Comment: 21 beers a week, 2 shots of liquor a week     Drug use: No    Sexual activity: Not on file   Other Topics Concern    Not on file   Social History Narrative    Not on file     Social Determinants of Health     Financial Resource Strain: Low Risk     Difficulty of Paying Living Expenses: Not hard at all   Food Insecurity: No Food Insecurity    Worried About Running Out of Food in the Last Year: Never true    [address] in the Last Year: Never true   Transportation Needs:     Lack of Transportation (Medical):  Lack of Transportation (Non-Medical):    Physical Activity:     Days of Exercise per Week:     Minutes of Exercise per Session:    Stress:     Feeling of Stress :    Social Connections:     Frequency of Communication with Friends and Family:     Frequency of Social Gatherings with Friends and Family:     Attends [employer] Services:     Active Member of Clubs or Organizations:     Attends Club or Organization Meetings:     Marital Status:    Intimate Partner Violence:     Fear of Current or Ex-Partner:     Emotionally Abused:     Physically Abused:     Sexually Abused:      Family History   Problem Relation Age of Onset    COPD Mother     Stroke Father     High Blood Pressure Brother        Current Outpatient Medications on File Prior to Visit   Medication Sig Dispense Refill    metoprolol succinate (TOPROL XL) 50 MG extended release tablet TAKE 1 TABLET BY MOUTH EVERY DAY 90 tablet 0    amLODIPine (NORVASC) 10 MG tablet TAKE 1 TABLET BY MOUTH EVERY DAY 90 tablet 0    tadalafil (CIALIS) 10 MG tablet Take 1 tablet by mouth daily as needed for Erectile Dysfunction 10 tablet 0    losartan (COZAAR) 100 MG tablet Take 1 tablet by mouth daily 30 tablet 5    aspirin 81 MG tablet Take 81 mg by mouth daily       No current facility-administered medications on file prior to visit.        He has not tried physical therapy. Recent Procedures:  N/A    Review of Systems   Constitutional: Negative for fever. HENT: Negative for hearing loss. Respiratory: Negative for shortness of breath. Gastrointestinal: Negative for constipation, diarrhea and nausea. Genitourinary: Negative for difficulty urinating. Musculoskeletal: Positive for back pain. Negative for neck pain. Skin: Negative for rash. Neurological: Negative for headaches. Hematological: Does not bruise/bleed easily. Psychiatric/Behavioral: Negative for sleep disturbance. Objective:     Vitals:  /68   Temp 97 °F (36.1 °C) (Infrared)   Ht 5' 7\" (1.702 m)   Wt 185 lb (83.9 kg)   BMI 28.98 kg/m² Pain Score:   3    PHYSICAL EXAM:    Patient alert and oriented times three, recent and remote memory intact, mood and affect normal, judgement and insight normal. Strength functional for ambulation. Balance and coordinational functional. Visualized skin intact. No visualized cyanosis, pulses intact. Cranial nerves 2-12 grossly intact. No obvious upper motor neuron signs seen. Sensation intact to light touch. The range of motion of the lumbosacral spine is limited especially toward the left. Straight leg raising positive. Radiculopathy:  Positive    Studies Review:  Reviewed Xrayreport of Lumbosacral spine dated 1/6/2021. Assessment:           Diagnosis Orders   1. Lumbar radiculopathy  MN NJX AA&/STRD TFRML EPI LUMBAR/SACRAL 1 LEVEL   2.  Lumbosacral spondylosis without myelopathy  MN NJX AA&/STRD TFRML EPI LUMBAR/SACRAL 1 LEVEL   3. DDD (degenerative disc disease), lumbar  MN NJX AA&/STRD TFRML EPI LUMBAR/SACRAL 1 LEVEL         Plan:     Orders Placed This Encounter   Procedures    MN NJX AA&/STRD TFRML EPI LUMBAR/SACRAL 1 LEVEL     Lt L5 SNRB     Standing Status:   Future     Standing Expiration Date:   1/31/2022       Orders Placed This Encounter   Medications    gabapentin (NEURONTIN) 300 MG capsule     Sig: Take 1 capsule by mouth 2 times daily for 30 days. Dispense:  60 capsule     Refill:  2       Pending left L5 selective nerve root block. We will place him on gabapentin 300 mg twice a day. The patient has been advised to contact us if he feels fine to cancel the scheduled block. Follow up:  Return for 1-2 week SNRB.     Harry Real MD

## 2023-07-18 DIAGNOSIS — I10 ESSENTIAL (PRIMARY) HYPERTENSION: ICD-10-CM

## 2023-07-18 RX ORDER — LOSARTAN POTASSIUM 100 MG/1
TABLET ORAL
Qty: 30 TABLET | Refills: 11 | Status: SHIPPED | OUTPATIENT
Start: 2023-07-18 | End: 2023-09-08 | Stop reason: SDUPTHER

## 2023-08-17 LAB
ALANINE AMINOTRANSFERASE (SGPT) (U/L) IN SER/PLAS: 18 U/L (ref 10–52)
ALBUMIN (G/DL) IN SER/PLAS: 4.1 G/DL (ref 3.4–5)
ALKALINE PHOSPHATASE (U/L) IN SER/PLAS: 84 U/L (ref 33–136)
ANION GAP IN SER/PLAS: 12 MMOL/L (ref 10–20)
ASPARTATE AMINOTRANSFERASE (SGOT) (U/L) IN SER/PLAS: 21 U/L (ref 9–39)
BASOPHILS (10*3/UL) IN BLOOD BY AUTOMATED COUNT: 0.07 X10E9/L (ref 0–0.1)
BASOPHILS/100 LEUKOCYTES IN BLOOD BY AUTOMATED COUNT: 1.1 % (ref 0–2)
BILIRUBIN TOTAL (MG/DL) IN SER/PLAS: 1.4 MG/DL (ref 0–1.2)
CALCIUM (MG/DL) IN SER/PLAS: 9.2 MG/DL (ref 8.6–10.3)
CARBON DIOXIDE, TOTAL (MMOL/L) IN SER/PLAS: 26 MMOL/L (ref 21–32)
CHLORIDE (MMOL/L) IN SER/PLAS: 103 MMOL/L (ref 98–107)
CHOLESTEROL (MG/DL) IN SER/PLAS: 170 MG/DL (ref 0–199)
CHOLESTEROL IN HDL (MG/DL) IN SER/PLAS: 52.1 MG/DL
CHOLESTEROL/HDL RATIO: 3.3
CREATININE (MG/DL) IN SER/PLAS: 0.8 MG/DL (ref 0.5–1.3)
EOSINOPHILS (10*3/UL) IN BLOOD BY AUTOMATED COUNT: 0.21 X10E9/L (ref 0–0.7)
EOSINOPHILS/100 LEUKOCYTES IN BLOOD BY AUTOMATED COUNT: 3.2 % (ref 0–6)
ERYTHROCYTE DISTRIBUTION WIDTH (RATIO) BY AUTOMATED COUNT: 12.9 % (ref 11.5–14.5)
ERYTHROCYTE MEAN CORPUSCULAR HEMOGLOBIN CONCENTRATION (G/DL) BY AUTOMATED: 32.7 G/DL (ref 32–36)
ERYTHROCYTE MEAN CORPUSCULAR VOLUME (FL) BY AUTOMATED COUNT: 86 FL (ref 80–100)
ERYTHROCYTES (10*6/UL) IN BLOOD BY AUTOMATED COUNT: 5.51 X10E12/L (ref 4.5–5.9)
GFR MALE: >90 ML/MIN/1.73M2
GLUCOSE (MG/DL) IN SER/PLAS: 98 MG/DL (ref 74–99)
HEMATOCRIT (%) IN BLOOD BY AUTOMATED COUNT: 47.4 % (ref 41–52)
HEMOGLOBIN (G/DL) IN BLOOD: 15.5 G/DL (ref 13.5–17.5)
IMMATURE GRANULOCYTES/100 LEUKOCYTES IN BLOOD BY AUTOMATED COUNT: 0.6 % (ref 0–0.9)
LDL: 103 MG/DL (ref 0–99)
LEUKOCYTES (10*3/UL) IN BLOOD BY AUTOMATED COUNT: 6.5 X10E9/L (ref 4.4–11.3)
LYMPHOCYTES (10*3/UL) IN BLOOD BY AUTOMATED COUNT: 1.2 X10E9/L (ref 1.2–4.8)
LYMPHOCYTES/100 LEUKOCYTES IN BLOOD BY AUTOMATED COUNT: 18.5 % (ref 13–44)
MAGNESIUM (MG/DL) IN SER/PLAS: 1.95 MG/DL (ref 1.6–2.4)
MONOCYTES (10*3/UL) IN BLOOD BY AUTOMATED COUNT: 0.99 X10E9/L (ref 0.1–1)
MONOCYTES/100 LEUKOCYTES IN BLOOD BY AUTOMATED COUNT: 15.3 % (ref 2–10)
NEUTROPHILS (10*3/UL) IN BLOOD BY AUTOMATED COUNT: 3.98 X10E9/L (ref 1.2–7.7)
NEUTROPHILS/100 LEUKOCYTES IN BLOOD BY AUTOMATED COUNT: 61.3 % (ref 40–80)
PLATELETS (10*3/UL) IN BLOOD AUTOMATED COUNT: 227 X10E9/L (ref 150–450)
POTASSIUM (MMOL/L) IN SER/PLAS: 4.1 MMOL/L (ref 3.5–5.3)
PROTEIN TOTAL: 7 G/DL (ref 6.4–8.2)
SODIUM (MMOL/L) IN SER/PLAS: 137 MMOL/L (ref 136–145)
THYROTROPIN (MIU/L) IN SER/PLAS BY DETECTION LIMIT <= 0.05 MIU/L: 0.86 MIU/L (ref 0.44–3.98)
TRIGLYCERIDE (MG/DL) IN SER/PLAS: 76 MG/DL (ref 0–149)
UREA NITROGEN (MG/DL) IN SER/PLAS: 22 MG/DL (ref 6–23)
VLDL: 15 MG/DL (ref 0–40)

## 2023-08-29 DIAGNOSIS — I10 ESSENTIAL (PRIMARY) HYPERTENSION: ICD-10-CM

## 2023-08-31 RX ORDER — AMLODIPINE BESYLATE 10 MG/1
10 TABLET ORAL DAILY
Qty: 30 TABLET | Refills: 0 | Status: SHIPPED | OUTPATIENT
Start: 2023-08-31 | End: 2023-09-08 | Stop reason: SDUPTHER

## 2023-08-31 RX ORDER — METOPROLOL SUCCINATE 50 MG/1
50 TABLET, EXTENDED RELEASE ORAL DAILY
Qty: 30 TABLET | Refills: 0 | Status: SHIPPED | OUTPATIENT
Start: 2023-08-31 | End: 2023-09-08 | Stop reason: SDUPTHER

## 2023-09-01 PROBLEM — M43.10 SPONDYLOLISTHESIS: Status: ACTIVE | Noted: 2023-09-01

## 2023-09-01 PROBLEM — N52.9 ERECTILE DYSFUNCTION: Status: ACTIVE | Noted: 2023-09-01

## 2023-09-01 PROBLEM — D12.6 ADENOMATOUS POLYP OF COLON: Status: RESOLVED | Noted: 2023-09-01 | Resolved: 2023-09-01

## 2023-09-01 PROBLEM — K76.89 BENIGN LIVER CYST: Chronic | Status: RESOLVED | Noted: 2021-03-25 | Resolved: 2023-09-01

## 2023-09-01 PROBLEM — M47.816 DEGENERATIVE ARTHRITIS OF LUMBAR SPINE: Status: ACTIVE | Noted: 2023-09-01

## 2023-09-01 PROBLEM — R35.0 BENIGN PROSTATIC HYPERPLASIA WITH URINARY FREQUENCY: Status: ACTIVE | Noted: 2023-09-01

## 2023-09-01 PROBLEM — M16.9 OSTEOARTHRITIS OF HIP: Status: ACTIVE | Noted: 2023-09-01

## 2023-09-01 PROBLEM — N40.1 BENIGN PROSTATIC HYPERPLASIA WITH URINARY FREQUENCY: Status: ACTIVE | Noted: 2023-09-01

## 2023-09-01 PROBLEM — M54.9 BACK PAIN: Status: ACTIVE | Noted: 2023-09-01

## 2023-09-01 LAB — PROSTATE SPECIFIC ANTIGEN,SCREEN: 0.92 NG/ML (ref 0–4)

## 2023-09-01 RX ORDER — ASPIRIN 81 MG/1
1 TABLET ORAL DAILY
COMMUNITY
Start: 2021-09-20 | End: 2023-09-08 | Stop reason: ALTCHOICE

## 2023-09-01 RX ORDER — TADALAFIL 2.5 MG/1
2.5 TABLET ORAL DAILY
COMMUNITY
End: 2023-09-08 | Stop reason: SDUPTHER

## 2023-09-08 ENCOUNTER — TELEPHONE (OUTPATIENT)
Dept: PRIMARY CARE | Facility: CLINIC | Age: 63
End: 2023-09-08

## 2023-09-08 ENCOUNTER — OFFICE VISIT (OUTPATIENT)
Dept: PRIMARY CARE | Facility: CLINIC | Age: 63
End: 2023-09-08
Payer: COMMERCIAL

## 2023-09-08 VITALS
DIASTOLIC BLOOD PRESSURE: 75 MMHG | SYSTOLIC BLOOD PRESSURE: 115 MMHG | WEIGHT: 180 LBS | OXYGEN SATURATION: 96 % | HEIGHT: 66 IN | RESPIRATION RATE: 16 BRPM | HEART RATE: 58 BPM | TEMPERATURE: 97.3 F | BODY MASS INDEX: 28.93 KG/M2

## 2023-09-08 DIAGNOSIS — Z00.00 ROUTINE GENERAL MEDICAL EXAMINATION AT A HEALTH CARE FACILITY: Primary | ICD-10-CM

## 2023-09-08 DIAGNOSIS — I10 ESSENTIAL (PRIMARY) HYPERTENSION: ICD-10-CM

## 2023-09-08 DIAGNOSIS — N52.9 VASCULOGENIC ERECTILE DYSFUNCTION, UNSPECIFIED VASCULOGENIC ERECTILE DYSFUNCTION TYPE: ICD-10-CM

## 2023-09-08 DIAGNOSIS — Z12.5 SPECIAL SCREENING EXAMINATION FOR NEOPLASM OF PROSTATE: ICD-10-CM

## 2023-09-08 PROCEDURE — 3074F SYST BP LT 130 MM HG: CPT | Performed by: FAMILY MEDICINE

## 2023-09-08 PROCEDURE — 1036F TOBACCO NON-USER: CPT | Performed by: FAMILY MEDICINE

## 2023-09-08 PROCEDURE — 99396 PREV VISIT EST AGE 40-64: CPT | Performed by: FAMILY MEDICINE

## 2023-09-08 PROCEDURE — 3078F DIAST BP <80 MM HG: CPT | Performed by: FAMILY MEDICINE

## 2023-09-08 RX ORDER — METOPROLOL SUCCINATE 25 MG/1
25 TABLET, EXTENDED RELEASE ORAL DAILY
Qty: 90 TABLET | Refills: 3 | Status: SHIPPED | OUTPATIENT
Start: 2023-09-08 | End: 2023-10-18 | Stop reason: SDUPTHER

## 2023-09-08 RX ORDER — AMLODIPINE BESYLATE 10 MG/1
10 TABLET ORAL DAILY
Qty: 90 TABLET | Refills: 3 | Status: SHIPPED | OUTPATIENT
Start: 2023-09-08 | End: 2024-09-07

## 2023-09-08 RX ORDER — TADALAFIL 2.5 MG/1
2.5 TABLET ORAL DAILY
Qty: 90 TABLET | Refills: 3 | Status: SHIPPED | OUTPATIENT
Start: 2023-09-08 | End: 2024-05-20 | Stop reason: ALTCHOICE

## 2023-09-08 RX ORDER — LOSARTAN POTASSIUM 100 MG/1
100 TABLET ORAL DAILY
Qty: 90 TABLET | Refills: 3 | Status: SHIPPED | OUTPATIENT
Start: 2023-09-08 | End: 2024-09-07

## 2023-09-08 ASSESSMENT — ENCOUNTER SYMPTOMS
FREQUENCY: 0
ARTHRALGIAS: 0
MYALGIAS: 0
WHEEZING: 0
SHORTNESS OF BREATH: 0
CONSTIPATION: 0
VOMITING: 0
FACIAL ASYMMETRY: 0
BLOOD IN STOOL: 0
ABDOMINAL DISTENTION: 0
LIGHT-HEADEDNESS: 0
TROUBLE SWALLOWING: 0
EYE REDNESS: 0
EYE DISCHARGE: 0
VOICE CHANGE: 0
NERVOUS/ANXIOUS: 0
FEVER: 0
EYE ITCHING: 0
EYE PAIN: 0
TREMORS: 0
UNEXPECTED WEIGHT CHANGE: 0
DIARRHEA: 0
HEADACHES: 0
CHEST TIGHTNESS: 0
SEIZURES: 0
SPEECH DIFFICULTY: 0
POLYDIPSIA: 0
DYSURIA: 0
SORE THROAT: 0
CONFUSION: 0
RHINORRHEA: 0
ADENOPATHY: 0
NECK STIFFNESS: 0
HEMATURIA: 0
ABDOMINAL PAIN: 0
NUMBNESS: 0
HYPERTENSION: 1
DIZZINESS: 0
BACK PAIN: 0
FATIGUE: 0
NECK PAIN: 0
COUGH: 0
ACTIVITY CHANGE: 0
FLANK PAIN: 0
WOUND: 0
DYSPHORIC MOOD: 0
CHILLS: 0
BRUISES/BLEEDS EASILY: 0
PALPITATIONS: 0
JOINT SWELLING: 0
WEAKNESS: 0
CHOKING: 0
HALLUCINATIONS: 0
SLEEP DISTURBANCE: 0
APPETITE CHANGE: 0
AGITATION: 0
PHOTOPHOBIA: 0
DIAPHORESIS: 0
NAUSEA: 0
SINUS PRESSURE: 0

## 2023-09-08 NOTE — TELEPHONE ENCOUNTER
Eliu pt calls in stating he received a bill for his recent labs. Pt did contact his insurance company and they stated that it wasn't coded as screening preventative. Can you look into this and pt can be reached at [phone]

## 2023-09-08 NOTE — PROGRESS NOTES
Subjective   Patient ID: Luis Alberto Lal is a 63 y.o. male who presents for Annual Exam (and review labs. ) and Arthritis (He would like a RX for Meloxicam ).  Subjective  Luis Alberto Lal is a 63 y.o. male and is here for a comprehensive physical exam. The patient reports htn.    Do you take any herbs or supplements that were not prescribed by a doctor? no  Are you taking calcium supplements? no  Are you taking aspirin daily? no      History:  Date last PSA: 9/2023      Arthritis  He reports no joint swelling. Pertinent negatives include no diarrhea, dysuria, fatigue, fever or rash.   Hypertension  This is a chronic problem. The current episode started more than 1 year ago. The problem is unchanged. The problem is controlled. Pertinent negatives include no chest pain, headaches, neck pain, palpitations or shortness of breath.       Review of Systems   Constitutional:  Negative for activity change, appetite change, chills, diaphoresis, fatigue, fever and unexpected weight change.   HENT:  Negative for congestion, ear pain, hearing loss, nosebleeds, postnasal drip, rhinorrhea, sinus pressure, sneezing, sore throat, tinnitus, trouble swallowing and voice change.    Eyes:  Negative for photophobia, pain, discharge, redness, itching and visual disturbance.   Respiratory:  Negative for cough, choking, chest tightness, shortness of breath and wheezing.    Cardiovascular:  Negative for chest pain, palpitations and leg swelling.   Gastrointestinal:  Negative for abdominal distention, abdominal pain, blood in stool, constipation, diarrhea, nausea and vomiting.   Endocrine: Negative for cold intolerance, heat intolerance, polydipsia and polyuria.   Genitourinary:  Negative for dysuria, flank pain, frequency, hematuria and urgency.   Musculoskeletal:  Positive for arthritis. Negative for arthralgias, back pain, joint swelling, myalgias, neck pain and neck stiffness.   Skin:  Negative for rash and wound.   Allergic/Immunologic:  "Negative for immunocompromised state.   Neurological:  Negative for dizziness, tremors, seizures, syncope, facial asymmetry, speech difficulty, weakness, light-headedness, numbness and headaches.   Hematological:  Negative for adenopathy. Does not bruise/bleed easily.   Psychiatric/Behavioral:  Negative for agitation, behavioral problems, confusion, dysphoric mood, hallucinations, self-injury, sleep disturbance and suicidal ideas. The patient is not nervous/anxious.        Objective   /75 (BP Location: Right arm, Patient Position: Sitting, BP Cuff Size: Large adult)   Pulse 58   Temp 36.3 °C (97.3 °F) (Temporal)   Resp 16   Ht 1.664 m (5' 5.5\")   Wt 81.6 kg (180 lb)   SpO2 96%   BMI 29.50 kg/m²   Physical Exam  Constitutional:       General: He is not in acute distress.     Appearance: He is not ill-appearing or diaphoretic.   HENT:      Head: Normocephalic and atraumatic.      Right Ear: External ear normal.      Left Ear: External ear normal.      Nose: Nose normal. No rhinorrhea.   Eyes:      General: Lids are normal. No scleral icterus.        Right eye: No discharge.         Left eye: No discharge.      Conjunctiva/sclera: Conjunctivae normal.   Cardiovascular:      Rate and Rhythm: Normal rate and regular rhythm.      Pulses: Normal pulses.      Heart sounds: No murmur heard.  Pulmonary:      Effort: Pulmonary effort is normal. No respiratory distress.      Breath sounds: No decreased breath sounds, wheezing, rhonchi or rales.   Abdominal:      General: Bowel sounds are normal. There is no distension.      Palpations: Abdomen is soft. There is no mass.      Tenderness: There is no abdominal tenderness. There is no guarding or rebound.   Musculoskeletal:         General: No swelling, tenderness or deformity.      Cervical back: No rigidity or tenderness.      Right lower leg: No edema.      Left lower leg: No edema.   Lymphadenopathy:      Cervical: No cervical adenopathy.      Upper Body:      " Right upper body: No supraclavicular adenopathy.      Left upper body: No supraclavicular adenopathy.   Skin:     General: Skin is warm and dry.      Coloration: Skin is not jaundiced or pale.      Findings: No erythema, lesion or rash.   Neurological:      General: No focal deficit present.      Mental Status: He is alert and oriented to person, place, and time.      Sensory: No sensory deficit.      Motor: No weakness or tremor.      Coordination: Coordination normal.      Gait: Gait normal.   Psychiatric:         Mood and Affect: Mood normal. Affect is not inappropriate.         Behavior: Behavior normal.         Assessment/Plan   Problem List Items Addressed This Visit       Erectile dysfunction    Relevant Medications    tadalafil (Cialis) 2.5 mg tablet     Other Visit Diagnoses       Routine yearly examination     -  Primary    Essential (primary) hypertension        Relevant Medications    amLODIPine (Norvasc) 10 mg tablet    metoprolol succinate XL (Toprol-XL) 25 mg 24 hr tablet    losartan (Cozaar) 100 mg tablet    Other Relevant Orders    CBC and Auto Differential    Comprehensive Metabolic Panel    Lipid Panel    Magnesium    TSH with reflex to Free T4 if abnormal    Special screening examination for neoplasm of prostate        Relevant Orders    Prostate Specific Antigen, Screen         1. Patient Counseling:  --Nutrition: Stressed importance of moderation in sodium/caffeine intake, saturated fat and cholesterol, caloric balance, sufficient intake of fresh fruits, vegetables, fiber, calcium, iron.  --Exercise: Stressed the importance of regular exercise.   --Substance Abuse: Discussed cessation/primary prevention of tobacco, alcohol, or other drug use; driving or other dangerous activities under the influence; availability of treatment for abuse.   --Injury prevention: Discussed safety belts, safety helmets, smoke detector, smoking near bedding or upholstery.   --Dental health: Discussed importance of  regular tooth brushing, flossing, and dental visits.  --Immunizations reviewed.  --Discussed benefits of screening colonoscopy.  2. Discussed the patient's BMI with him.  The BMI is in the acceptable range.  3. Follow up 1 yr

## 2023-09-12 DIAGNOSIS — M47.816 OSTEOARTHRITIS OF LUMBAR SPINE, UNSPECIFIED SPINAL OSTEOARTHRITIS COMPLICATION STATUS: Primary | ICD-10-CM

## 2023-09-12 NOTE — TELEPHONE ENCOUNTER
Patient phoned the office requesting a prescription of Meloxicam 15 mg. Sig. Take 1 as needed - [facility], originally prescribed by Dr. Tushar Blackman    Patient indicates he thought at time of recent appointment it was discussed the Amlodipine was going to be adjusted to a lower dose - please verify

## 2023-09-13 RX ORDER — MELOXICAM 15 MG/1
15 TABLET ORAL DAILY
COMMUNITY
End: 2023-09-13 | Stop reason: SDUPTHER

## 2023-09-13 RX ORDER — MELOXICAM 15 MG/1
15 TABLET ORAL DAILY
Qty: 30 TABLET | Refills: 1 | Status: SHIPPED | OUTPATIENT
Start: 2023-09-13 | End: 2023-10-23

## 2023-09-14 NOTE — TELEPHONE ENCOUNTER
Phoned patient left message on voicemail requesting patient have conversation with insurance to clarify which lab reflects this balance -  laboratory billing stated patient owes $39.36 towards deductible no coding issues identified     Dx. I10 used 2023 previous year Z00.00 (which no longer recognized as a covered Dx. With Medicare ( was not sure about Med. Mut.) Per billing)    If code revision requested fax [phone]

## 2023-10-18 DIAGNOSIS — I10 ESSENTIAL (PRIMARY) HYPERTENSION: ICD-10-CM

## 2023-10-18 RX ORDER — METOPROLOL SUCCINATE 50 MG/1
50 TABLET, EXTENDED RELEASE ORAL DAILY
Qty: 90 TABLET | Refills: 1 | Status: SHIPPED | OUTPATIENT
Start: 2023-10-18 | End: 2024-03-18

## 2023-10-20 DIAGNOSIS — M47.816 OSTEOARTHRITIS OF LUMBAR SPINE, UNSPECIFIED SPINAL OSTEOARTHRITIS COMPLICATION STATUS: ICD-10-CM

## 2023-10-23 RX ORDER — MELOXICAM 15 MG/1
15 TABLET ORAL DAILY
Qty: 30 TABLET | Refills: 11 | Status: SHIPPED | OUTPATIENT
Start: 2023-10-23

## 2024-03-06 ENCOUNTER — HOSPITAL ENCOUNTER (OUTPATIENT)
Dept: RADIOLOGY | Facility: CLINIC | Age: 64
Discharge: HOME | End: 2024-03-06
Payer: COMMERCIAL

## 2024-03-06 ENCOUNTER — OFFICE VISIT (OUTPATIENT)
Dept: ORTHOPEDIC SURGERY | Facility: CLINIC | Age: 64
End: 2024-03-06
Payer: COMMERCIAL

## 2024-03-06 DIAGNOSIS — M16.12 PRIMARY OSTEOARTHRITIS OF LEFT HIP: Primary | ICD-10-CM

## 2024-03-06 DIAGNOSIS — M16.12 PRIMARY OSTEOARTHRITIS OF LEFT HIP: ICD-10-CM

## 2024-03-06 PROCEDURE — 73502 X-RAY EXAM HIP UNI 2-3 VIEWS: CPT | Mod: LT

## 2024-03-06 PROCEDURE — 99214 OFFICE O/P EST MOD 30 MIN: CPT | Performed by: ORTHOPAEDIC SURGERY

## 2024-03-06 PROCEDURE — 73502 X-RAY EXAM HIP UNI 2-3 VIEWS: CPT | Mod: LEFT SIDE | Performed by: ORTHOPAEDIC SURGERY

## 2024-03-06 PROCEDURE — 1036F TOBACCO NON-USER: CPT | Performed by: ORTHOPAEDIC SURGERY

## 2024-03-07 NOTE — PROGRESS NOTES
63-year-old gentleman I seen the remote past presents complaining increasing pain in his left hip.  He had a right hip resurfacing over 10 years ago and has had a good result on the right side he knew he had arthritis on the left side at that time the left hip pain over the last 2 years has been getting progressively worse and now to the point which is severely affecting his activities daily living he is having severe impairment of bodily function related to the left hip arthritis.  He is interested in surgical treatment    Location of pain: In the groin on the left side  Quality of pain: Chronic pain for many years but getting much worse over the last 2 years  Modifying factors: Worse when he stands for prolonged period time or gets in and out of a car better with rest  Associated signs and symptoms: No numbness or tingling noticing decreased range of motion and difficulty with mobility  Previous treatment: History of a right hip resurfacing.  He is also been taking meloxicam        The patient's past medical history, family history, social history, and review of systems were documented on the patient's medical intake form.  The medical intake form was reviewed and scanned into the electronic medical record for future use.  History is otherwise negative except as stated in the HPI.    Physical exam    General: Alert and oriented to place, person, and time.  No acute distress and breathing comfortably; pleasant and cooperative with the examination.  HEENT: Head is normocephalic and atraumatic.  Neck: Supple, no visible swelling.  Cardiovascular: Good perfusion to the affected extremity.  Lungs: No audible wheezing or labored breathing.  Abdomen: Nondistended  HEME/Lymph : No visible abnormalities bilateral lower extremity    Extremity:  The affected hip was examined and inspected and was tender to touch along the groin and lateral bursal area.  The hip joint occasionally displayed catching, locking, and  mechanical symptoms.  The skin was intact without breakdown or open wounds.  There was some mild crepitus seen with hip internal and external range of motion without evidence of instability.  Inspection of the low back showed normal curvature and integrity of the skin.  Strength and stability of the low back muscles and ligaments were within normal limits.  There was a negative straight leg raise test with no foot drop, numbness, or tingling in both lower extremities.  Sensation, reflexes, and pulses in the foot and ankle are preserved.  There was no obvious effusion.  Range of motion showed flexion and internal and external rotation were all limited with pain.  The patient had the ability to bear weight, but with discomfort.  The patient's gait was antalgic secondary to the discomfort.    Diagnostics:      XR hip left with pelvis when performed 2 or 3 views    Result Date: 3/6/2024  Interpreted By:  Seb West, STUDY: XR HIP LEFT WITH PELVIS WHEN PERFORMED 2 OR 3 VIEWS; 3/6/2024 2:03 pm   INDICATION: Signs/Symptoms:pain.   ACCESSION NUMBER(S): [id number]   ORDERING CLINICIAN: SEB WEST   FINDINGS: AP pelvis and lateral view of the left hip. Patient is status post right total hip resurfacing in good alignment without abnormality. Severe left hip arthritis with joint space narrowing subchondral sclerosis and cyst formation without osteophyte formation no signs of fracture dislocation or other bony abnormality     Signed by: Seb West 3/6/2024 2:53 PM Dictation workstation:   [id number]         Procedures  [none   ]    Assessment:  Left hip arthritis    Treatment plan:  1.  The natural history of the condition and its associated treatment alternatives including surgical and nonsurgical options were discussed with the patient at length.  2.  Patient with significant impairment of the left hip refractory to conservative treatment he is having severe impairment of bodily function we discussed  surgical nonsurgical options he like to proceed with left total hip replacement.  The procedures risk benefits treatment alternatives and postoperative course were discussed with he and her stated wishes to proceed.  He is very interested in the surgery center if possible.  3.  Patient has failed all forms of conservative treatment.  The joint pain is significantly affecting quality of life and activities of daily living.    The patient has pain in the joint that is increased with activity and weightbearing, and walks with an antalgic gait.  The pain is interfering with activities of daily living.  Patient has limited range of motion and pain with passive range of motion.  X-rays demonstrate joint space narrowing, subchondral sclerosis and osteophyte formation.  Symptoms are not improving with medication, physical therapy or supportive device for a period of at least 3 months.  Weight loss and smoking cessation have been discussed with the patient.  Patient advised on when to cease smoking 6-8 weeks before the procedure.      Patient would like to go and schedule joint replacement surgery.  The procedure its risks, benefits, and treatment alternatives were discussed at length and patient would like to proceed.  Patient is going to schedule the procedure at their earliest convenience and see me back for a preop visit just prior.  Preadmission testing, medical checkup, and insurance authorization will be performed in the meantime.  Patient understands a joint replacement surgery is a last resort, although a very successful operation results are not guaranteed.  4.  All of the patient's questions were answered.    Orders Placed This Encounter    XR hip left with pelvis when performed 2 or 3 views       This note was prepared using voice recognition software.  The details of this note are correct and have been reviewed, and corrected to the best of my ability.  Some grammatical areas may persist related to the Dragon  software    Jake West MD  Senior Attending Physician  [facility]  [facility] [place]    [phone]

## 2024-03-15 DIAGNOSIS — I10 ESSENTIAL (PRIMARY) HYPERTENSION: ICD-10-CM

## 2024-03-18 RX ORDER — METOPROLOL SUCCINATE 50 MG/1
50 TABLET, EXTENDED RELEASE ORAL DAILY
Qty: 90 TABLET | Refills: 1 | Status: SHIPPED | OUTPATIENT
Start: 2024-03-18 | End: 2024-05-20 | Stop reason: SDUPTHER

## 2024-05-17 ENCOUNTER — LAB (OUTPATIENT)
Dept: LAB | Facility: LAB | Age: 64
End: 2024-05-17
Payer: COMMERCIAL

## 2024-05-17 DIAGNOSIS — Z12.5 SPECIAL SCREENING EXAMINATION FOR NEOPLASM OF PROSTATE: ICD-10-CM

## 2024-05-17 DIAGNOSIS — I10 ESSENTIAL (PRIMARY) HYPERTENSION: ICD-10-CM

## 2024-05-17 DIAGNOSIS — Z11.4 SCREENING FOR HUMAN IMMUNODEFICIENCY VIRUS WITHOUT PRESENCE OF RISK FACTORS: ICD-10-CM

## 2024-05-17 LAB
ALBUMIN SERPL BCP-MCNC: 4 G/DL (ref 3.4–5)
ALP SERPL-CCNC: 62 U/L (ref 33–136)
ALT SERPL W P-5'-P-CCNC: 21 U/L (ref 10–52)
ANION GAP SERPL CALC-SCNC: 10 MMOL/L (ref 10–20)
AST SERPL W P-5'-P-CCNC: 19 U/L (ref 9–39)
BASOPHILS # BLD AUTO: 0.08 X10*3/UL (ref 0–0.1)
BASOPHILS NFR BLD AUTO: 1.3 %
BILIRUB SERPL-MCNC: 1.5 MG/DL (ref 0–1.2)
BUN SERPL-MCNC: 27 MG/DL (ref 6–23)
CALCIUM SERPL-MCNC: 9.1 MG/DL (ref 8.6–10.3)
CHLORIDE SERPL-SCNC: 105 MMOL/L (ref 98–107)
CHOLEST SERPL-MCNC: 169 MG/DL (ref 0–199)
CHOLESTEROL/HDL RATIO: 3.1
CO2 SERPL-SCNC: 26 MMOL/L (ref 21–32)
CREAT SERPL-MCNC: 0.75 MG/DL (ref 0.5–1.3)
EGFRCR SERPLBLD CKD-EPI 2021: >90 ML/MIN/1.73M*2
EOSINOPHIL # BLD AUTO: 0.25 X10*3/UL (ref 0–0.7)
EOSINOPHIL NFR BLD AUTO: 4.1 %
ERYTHROCYTE [DISTWIDTH] IN BLOOD BY AUTOMATED COUNT: 12.9 % (ref 11.5–14.5)
GLUCOSE SERPL-MCNC: 95 MG/DL (ref 74–99)
HCT VFR BLD AUTO: 45.3 % (ref 41–52)
HDLC SERPL-MCNC: 54.2 MG/DL
HGB BLD-MCNC: 14.6 G/DL (ref 13.5–17.5)
IMM GRANULOCYTES # BLD AUTO: 0.03 X10*3/UL (ref 0–0.7)
IMM GRANULOCYTES NFR BLD AUTO: 0.5 % (ref 0–0.9)
LDLC SERPL CALC-MCNC: 101 MG/DL
LYMPHOCYTES # BLD AUTO: 1.88 X10*3/UL (ref 1.2–4.8)
LYMPHOCYTES NFR BLD AUTO: 30.8 %
MAGNESIUM SERPL-MCNC: 2.13 MG/DL (ref 1.6–2.4)
MCH RBC QN AUTO: 28.4 PG (ref 26–34)
MCHC RBC AUTO-ENTMCNC: 32.2 G/DL (ref 32–36)
MCV RBC AUTO: 88 FL (ref 80–100)
MONOCYTES # BLD AUTO: 0.7 X10*3/UL (ref 0.1–1)
MONOCYTES NFR BLD AUTO: 11.5 %
NEUTROPHILS # BLD AUTO: 3.17 X10*3/UL (ref 1.2–7.7)
NEUTROPHILS NFR BLD AUTO: 51.8 %
NON HDL CHOLESTEROL: 115 MG/DL (ref 0–149)
NRBC BLD-RTO: 0 /100 WBCS (ref 0–0)
PLATELET # BLD AUTO: 246 X10*3/UL (ref 150–450)
POTASSIUM SERPL-SCNC: 4.2 MMOL/L (ref 3.5–5.3)
PROT SERPL-MCNC: 6.5 G/DL (ref 6.4–8.2)
PSA SERPL-MCNC: 0.83 NG/ML
RBC # BLD AUTO: 5.14 X10*6/UL (ref 4.5–5.9)
SODIUM SERPL-SCNC: 137 MMOL/L (ref 136–145)
TRIGL SERPL-MCNC: 69 MG/DL (ref 0–149)
TSH SERPL-ACNC: 1.01 MIU/L (ref 0.44–3.98)
VLDL: 14 MG/DL (ref 0–40)
WBC # BLD AUTO: 6.1 X10*3/UL (ref 4.4–11.3)

## 2024-05-17 PROCEDURE — 80053 COMPREHEN METABOLIC PANEL: CPT

## 2024-05-17 PROCEDURE — 87389 HIV-1 AG W/HIV-1&-2 AB AG IA: CPT

## 2024-05-17 PROCEDURE — 83735 ASSAY OF MAGNESIUM: CPT

## 2024-05-17 PROCEDURE — 36415 COLL VENOUS BLD VENIPUNCTURE: CPT

## 2024-05-17 PROCEDURE — 85025 COMPLETE CBC W/AUTO DIFF WBC: CPT

## 2024-05-17 PROCEDURE — 80061 LIPID PANEL: CPT

## 2024-05-17 PROCEDURE — 84443 ASSAY THYROID STIM HORMONE: CPT

## 2024-05-17 PROCEDURE — 84153 ASSAY OF PSA TOTAL: CPT

## 2024-05-20 ENCOUNTER — OFFICE VISIT (OUTPATIENT)
Dept: PRIMARY CARE | Facility: CLINIC | Age: 64
End: 2024-05-20
Payer: COMMERCIAL

## 2024-05-20 VITALS
HEIGHT: 66 IN | OXYGEN SATURATION: 95 % | TEMPERATURE: 97.7 F | DIASTOLIC BLOOD PRESSURE: 75 MMHG | WEIGHT: 175 LBS | RESPIRATION RATE: 18 BRPM | SYSTOLIC BLOOD PRESSURE: 113 MMHG | BODY MASS INDEX: 28.12 KG/M2 | HEART RATE: 66 BPM

## 2024-05-20 DIAGNOSIS — Z11.59 ENCOUNTER FOR HEPATITIS C SCREENING TEST FOR LOW RISK PATIENT: ICD-10-CM

## 2024-05-20 DIAGNOSIS — Z01.818 PREOPERATIVE EXAMINATION: Primary | ICD-10-CM

## 2024-05-20 DIAGNOSIS — G89.29 CHRONIC HIP PAIN, LEFT: ICD-10-CM

## 2024-05-20 DIAGNOSIS — Z11.4 SCREENING FOR HUMAN IMMUNODEFICIENCY VIRUS WITHOUT PRESENCE OF RISK FACTORS: ICD-10-CM

## 2024-05-20 DIAGNOSIS — M16.12 PRIMARY OSTEOARTHRITIS OF LEFT HIP: ICD-10-CM

## 2024-05-20 DIAGNOSIS — M25.552 CHRONIC HIP PAIN, LEFT: ICD-10-CM

## 2024-05-20 DIAGNOSIS — I10 ESSENTIAL (PRIMARY) HYPERTENSION: ICD-10-CM

## 2024-05-20 LAB — HIV 1+2 AB+HIV1 P24 AG SERPL QL IA: NONREACTIVE

## 2024-05-20 PROCEDURE — 99214 OFFICE O/P EST MOD 30 MIN: CPT | Performed by: FAMILY MEDICINE

## 2024-05-20 PROCEDURE — 3078F DIAST BP <80 MM HG: CPT | Performed by: FAMILY MEDICINE

## 2024-05-20 PROCEDURE — 3074F SYST BP LT 130 MM HG: CPT | Performed by: FAMILY MEDICINE

## 2024-05-20 PROCEDURE — 1036F TOBACCO NON-USER: CPT | Performed by: FAMILY MEDICINE

## 2024-05-20 PROCEDURE — 93000 ELECTROCARDIOGRAM COMPLETE: CPT | Performed by: FAMILY MEDICINE

## 2024-05-20 RX ORDER — METOPROLOL SUCCINATE 25 MG/1
25 TABLET, EXTENDED RELEASE ORAL DAILY
Start: 2024-05-20 | End: 2024-11-16

## 2024-05-20 ASSESSMENT — ENCOUNTER SYMPTOMS
ABDOMINAL PAIN: 0
HALLUCINATIONS: 0
SORE THROAT: 0
FREQUENCY: 0
FEVER: 0
VOMITING: 0
HEADACHES: 0
CHILLS: 0
NECK STIFFNESS: 0
NERVOUS/ANXIOUS: 0
JOINT SWELLING: 0
FATIGUE: 0
CHOKING: 0
POLYDIPSIA: 0
ACTIVITY CHANGE: 0
BACK PAIN: 0
ABDOMINAL DISTENTION: 0
BRUISES/BLEEDS EASILY: 0
FACIAL ASYMMETRY: 0
SINUS PRESSURE: 0
HEMATURIA: 0
NUMBNESS: 0
PALPITATIONS: 0
WEAKNESS: 0
MYALGIAS: 0
DIARRHEA: 0
TROUBLE SWALLOWING: 0
UNEXPECTED WEIGHT CHANGE: 0
WHEEZING: 0
ARTHRALGIAS: 0
DYSURIA: 0
EYE ITCHING: 0
DIZZINESS: 0
APPETITE CHANGE: 0
CONFUSION: 0
CHEST TIGHTNESS: 0
ADENOPATHY: 0
CONSTIPATION: 0
EYE REDNESS: 0
HYPERTENSION: 1
LIGHT-HEADEDNESS: 0
DYSPHORIC MOOD: 0
WOUND: 0
NAUSEA: 0
COUGH: 0
DIAPHORESIS: 0
SHORTNESS OF BREATH: 0
SLEEP DISTURBANCE: 0
VOICE CHANGE: 0
AGITATION: 0
NECK PAIN: 0
BLOOD IN STOOL: 0
SEIZURES: 0
PHOTOPHOBIA: 0
SPEECH DIFFICULTY: 0
TREMORS: 0
EYE PAIN: 0
FLANK PAIN: 0
EYE DISCHARGE: 0
RHINORRHEA: 0

## 2024-05-20 NOTE — PROGRESS NOTES
Subjective   Patient ID: Luis Alberto Lal is a 63 y.o. male who presents for Pre-op Exam (Clearance /Luis Alberto Lal 63 y.o. male is here today for Pre-Op Clearance for Total Left hip replacement. male is scheduled for surgery on 6/17/2024 with Dr. Allen at  [facility]  ) and check up (And review labs ).    Subjective  Luis Alberto Lal is a 63 y.o. male who presents to the office today for a preoperative consultation at the request of surgeon brittani who plans on performing left total hip replacement on June 17. This consultation is requested for the specific conditions prompting preoperative evaluation (i.e. because of potential affect on operative risk): Hypertension. Planned anesthesia is uncertain/diet less than. The patient has the following known anesthesia issues: Had hallucinations with morphine otherwise has tolerated general anesthesia well. Patient has a bleeding risk of: use of Ca-channel blockers (see med list). Patient does not have objections to receiving blood products if needed.       Predictors of intubation difficulty:  Morbid obesity? no  Anatomically abnormal facies? no  Prominent incisors? no  Receding mandible? no  Short, thick neck? no  Dentition: Chipped teeth but none loose or missing    Cardiographics  No preadmission testing scheduled yet-will arrange for EKG here today      Lab Review   Lab on 05/17/2024  WBC                       Value: 6.1(x10*3/uL)      Dt: 05/17/2024  nRBC                      Value: 0.0(/100 WBCs)     Dt: 05/17/2024  RBC                       Value: 5.14(x10*6/uL)     Dt: 05/17/2024  Hemoglobin                Value: 14.6(g/dL)         Dt: 05/17/2024  Hematocrit                Value: 45.3(%)            Dt: 05/17/2024  MCV                       Value: 88(fL)             Dt: 05/17/2024  MCH                       Value: 28.4(pg)           Dt: 05/17/2024  MCHC                      Value: 32.2(g/dL)         Dt: 05/17/2024  RDW                       Value:  12.9(%)            Dt: 05/17/2024  Platelets                 Value: 246(x10*3/uL)      Dt: 05/17/2024  Neutrophils %             Value: 51.8(%)            Dt: 05/17/2024  Immature Granulocytes %,* Value: 0.5(%)             Dt: 05/17/2024  Lymphocytes %             Value: 30.8(%)            Dt: 05/17/2024  Monocytes %               Value: 11.5(%)            Dt: 05/17/2024  Eosinophils %             Value: 4.1(%)             Dt: 05/17/2024  Basophils %               Value: 1.3(%)             Dt: 05/17/2024  Neutrophils Absolute      Value: 3.17(x10*3/uL)     Dt: 05/17/2024  Immature Granulocytes Ab* Value: 0.03(x10*3/uL)     Dt: 05/17/2024  Lymphocytes Absolute      Value: 1.88(x10*3/uL)     Dt: 05/17/2024  Monocytes Absolute        Value: 0.70(x10*3/uL)     Dt: 05/17/2024  Eosinophils Absolute      Value: 0.25(x10*3/uL)     Dt: 05/17/2024  Basophils Absolute        Value: 0.08(x10*3/uL)     Dt: 05/17/2024  Glucose                   Value: 95(mg/dL)          Dt: 05/17/2024  Sodium                    Value: 137(mmol/L)        Dt: 05/17/2024  Potassium                 Value: 4.2(mmol/L)        Dt: 05/17/2024  Chloride                  Value: 105(mmol/L)        Dt: 05/17/2024  Bicarbonate               Value: 26(mmol/L)         Dt: 05/17/2024  Anion Gap                 Value: 10(mmol/L)         Dt: 05/17/2024  Urea Nitrogen             Value: 27(mg/dL) (H)      Dt: 05/17/2024  Creatinine                Value: 0.75(mg/dL)        Dt: 05/17/2024  eGFR                      Value: >90(mL/min/1.73m*2) Dt: 05/17/2024  Calcium                   Value: 9.1(mg/dL)         Dt: 05/17/2024  Albumin                   Value: 4.0(g/dL)          Dt: 05/17/2024  Alkaline Phosphatase      Value: 62(U/L)            Dt: 05/17/2024  Total Protein             Value: 6.5(g/dL)          Dt: 05/17/2024  AST                       Value: 19(U/L)            Dt: 05/17/2024  Bilirubin, Total          Value: 1.5(mg/dL) (H)     Dt: 05/17/2024  ALT                        Value: 21(U/L)            Dt: 05/17/2024  Cholesterol               Value: 169(mg/dL)         Dt: 05/17/2024  HDL-Cholesterol           Value: 54.2(mg/dL)        Dt: 05/17/2024  Cholesterol/HDL Ratio     Value: 3.1                Dt: 05/17/2024  LDL Calculated            Value: 101(mg/dL) (H)     Dt: 05/17/2024  VLDL                      Value: 14(mg/dL)          Dt: 05/17/2024  Triglycerides             Value: 69(mg/dL)          Dt: 05/17/2024  Non HDL Cholesterol       Value: 115(mg/dL)         Dt: 05/17/2024  Magnesium                 Value: 2.13(mg/dL)        Dt: 05/17/2024  Thyroid Stimulating Horm* Value: 1.01(mIU/L)        Dt: 05/17/2024  Prostate Specific Antige* Value: 0.83(ng/mL)        Dt: 05/17/2024  ------------           Review of Systems   Constitutional:  Negative for activity change, appetite change, chills, diaphoresis, fatigue, fever and unexpected weight change.   HENT:  Negative for congestion, ear pain, hearing loss, nosebleeds, postnasal drip, rhinorrhea, sinus pressure, sneezing, sore throat, tinnitus, trouble swallowing and voice change.    Eyes:  Negative for photophobia, pain, discharge, redness, itching and visual disturbance.   Respiratory:  Negative for cough, choking, chest tightness, shortness of breath and wheezing.    Cardiovascular:  Negative for chest pain, palpitations and leg swelling.   Gastrointestinal:  Negative for abdominal distention, abdominal pain, blood in stool, constipation, diarrhea, nausea and vomiting.   Endocrine: Negative for cold intolerance, heat intolerance, polydipsia and polyuria.   Genitourinary:  Negative for dysuria, flank pain, frequency, hematuria and urgency.   Musculoskeletal:  Positive for arthritis. Negative for arthralgias, back pain, joint swelling, myalgias, neck pain and neck stiffness.   Skin:  Negative for rash and wound.   Allergic/Immunologic: Negative for immunocompromised state.   Neurological:  Negative for dizziness,  "tremors, seizures, syncope, facial asymmetry, speech difficulty, weakness, light-headedness, numbness and headaches.   Hematological:  Negative for adenopathy. Does not bruise/bleed easily.   Psychiatric/Behavioral:  Negative for agitation, behavioral problems, confusion, dysphoric mood, hallucinations, self-injury, sleep disturbance and suicidal ideas. The patient is not nervous/anxious.        Objective   /75 (BP Location: Left arm, Patient Position: Sitting, BP Cuff Size: Large adult)   Pulse 66   Temp 36.5 °C (97.7 °F) (Temporal)   Resp 18   Ht 1.664 m (5' 5.5\")   Wt 79.4 kg (175 lb)   SpO2 95%   BMI 28.68 kg/m²   Physical Exam  Constitutional:       General: He is not in acute distress.     Appearance: He is not ill-appearing or diaphoretic.   HENT:      Head: Normocephalic and atraumatic.      Right Ear: External ear normal.      Left Ear: External ear normal.      Nose: Nose normal. No rhinorrhea.   Eyes:      General: Lids are normal. No scleral icterus.        Right eye: No discharge.         Left eye: No discharge.      Conjunctiva/sclera: Conjunctivae normal.   Cardiovascular:      Rate and Rhythm: Regular rhythm.      Pulses: Normal pulses.      Heart sounds: No murmur heard.  Pulmonary:      Effort: Pulmonary effort is normal. No respiratory distress.      Breath sounds: No decreased breath sounds, wheezing, rhonchi or rales.   Abdominal:      General: Bowel sounds are normal. There is no distension.      Palpations: Abdomen is soft. There is no mass.      Tenderness: There is no abdominal tenderness. There is no guarding or rebound.   Musculoskeletal:         General: No swelling, tenderness or deformity.      Cervical back: No rigidity or tenderness.      Right lower leg: No edema.      Left lower leg: No edema.   Lymphadenopathy:      Cervical: No cervical adenopathy.      Upper Body:      Right upper body: No supraclavicular adenopathy.      Left upper body: No supraclavicular " adenopathy.   Skin:     General: Skin is warm and dry.      Coloration: Skin is not jaundiced or pale.      Findings: No erythema, lesion or rash.   Neurological:      General: No focal deficit present.      Mental Status: He is alert and oriented to person, place, and time.      Sensory: No sensory deficit.      Motor: No weakness or tremor.      Coordination: Coordination normal.      Gait: Gait normal.   Psychiatric:         Mood and Affect: Mood normal. Affect is not inappropriate.         Behavior: Behavior normal.         Assessment/Plan   Problem List Items Addressed This Visit       Osteoarthritis of hip    Relevant Orders    CBC and Auto Differential    Iron and TIBC    Follow Up In Primary Care - Health Maintenance     Other Visit Diagnoses       Preoperative examination    -  Primary    Relevant Orders    ECG 12 Lead (Completed)    Chronic hip pain, left        Relevant Orders    CBC and Auto Differential    Iron and TIBC    Follow Up In Primary Care - Health Maintenance    Screening for human immunodeficiency virus without presence of risk factors        Relevant Orders    HIV 1/2 Antigen/Antibody Screen with Reflex to Confirmation    Encounter for hepatitis C screening test for low risk patient        Relevant Orders    Hepatitis C antibody    Essential (primary) hypertension        Relevant Medications    metoprolol succinate XL (Toprol-XL) 25 mg 24 hr tablet        Assessment/Plan  63 y.o. male with planned surgery as above.    Known risk factors for perioperative complications: None    Difficulty with intubation is not anticipated.  Patient cleared for surgery from primary care point of view.    Current medications which may produce withdrawal symptoms if withheld perioperatively: Metoprolol.     1. Preoperative workup as follows ECG, hemoglobin, hematocrit, electrolytes, creatinine, glucose, liver function studies.    2. Change in medication regimen before surgery: Recommend hold all OTC supplements  and meloxicam,  last dose June 6.    May hold all other medications on morning of surgery.  (Takes all blood pressure medication including metoprolol in p.m.) may resume all medications postsurgically when okay with surgeon and taking orals adequately.    3. Prophylaxis for cardiac events with perioperative beta-blockers: should be considered, specific regimen per anesthesia.  4. Invasive hemodynamic monitoring perioperatively: at the discretion of anesthesiologist.  5. Deep vein thrombosis prophylaxis postoperatively:regimen to be chosen by surgical team.  6. Surveillance for postoperative MI with ECG immediately postoperatively and on postoperative days 1 and 2 AND troponin levels 24 hours postoperatively and on day 4 or hospital discharge (whichever comes first): at the discretion of anesthesiologist.

## 2024-05-20 NOTE — H&P (VIEW-ONLY)
Subjective   Patient ID: Luis Alberto Lal is a 63 y.o. male who presents for Pre-op Exam (Clearance /Luis Alberto Lal 63 y.o. male is here today for Pre-Op Clearance for Total Left hip replacement. male is scheduled for surgery on 6/17/2024 with Dr. Allen at  [facility]  ) and check up (And review labs ).    Subjective  Luis Alberto Lal is a 63 y.o. male who presents to the office today for a preoperative consultation at the request of surgeon brittani who plans on performing left total hip replacement on June 17. This consultation is requested for the specific conditions prompting preoperative evaluation (i.e. because of potential affect on operative risk): Hypertension. Planned anesthesia is uncertain/diet less than. The patient has the following known anesthesia issues: Had hallucinations with morphine otherwise has tolerated general anesthesia well. Patient has a bleeding risk of: use of Ca-channel blockers (see med list). Patient does not have objections to receiving blood products if needed.       Predictors of intubation difficulty:  Morbid obesity? no  Anatomically abnormal facies? no  Prominent incisors? no  Receding mandible? no  Short, thick neck? no  Dentition: Chipped teeth but none loose or missing    Cardiographics  No preadmission testing scheduled yet-will arrange for EKG here today      Lab Review   Lab on 05/17/2024  WBC                       Value: 6.1(x10*3/uL)      Dt: 05/17/2024  nRBC                      Value: 0.0(/100 WBCs)     Dt: 05/17/2024  RBC                       Value: 5.14(x10*6/uL)     Dt: 05/17/2024  Hemoglobin                Value: 14.6(g/dL)         Dt: 05/17/2024  Hematocrit                Value: 45.3(%)            Dt: 05/17/2024  MCV                       Value: 88(fL)             Dt: 05/17/2024  MCH                       Value: 28.4(pg)           Dt: 05/17/2024  MCHC                      Value: 32.2(g/dL)         Dt: 05/17/2024  RDW                       Value:  12.9(%)            Dt: 05/17/2024  Platelets                 Value: 246(x10*3/uL)      Dt: 05/17/2024  Neutrophils %             Value: 51.8(%)            Dt: 05/17/2024  Immature Granulocytes %,* Value: 0.5(%)             Dt: 05/17/2024  Lymphocytes %             Value: 30.8(%)            Dt: 05/17/2024  Monocytes %               Value: 11.5(%)            Dt: 05/17/2024  Eosinophils %             Value: 4.1(%)             Dt: 05/17/2024  Basophils %               Value: 1.3(%)             Dt: 05/17/2024  Neutrophils Absolute      Value: 3.17(x10*3/uL)     Dt: 05/17/2024  Immature Granulocytes Ab* Value: 0.03(x10*3/uL)     Dt: 05/17/2024  Lymphocytes Absolute      Value: 1.88(x10*3/uL)     Dt: 05/17/2024  Monocytes Absolute        Value: 0.70(x10*3/uL)     Dt: 05/17/2024  Eosinophils Absolute      Value: 0.25(x10*3/uL)     Dt: 05/17/2024  Basophils Absolute        Value: 0.08(x10*3/uL)     Dt: 05/17/2024  Glucose                   Value: 95(mg/dL)          Dt: 05/17/2024  Sodium                    Value: 137(mmol/L)        Dt: 05/17/2024  Potassium                 Value: 4.2(mmol/L)        Dt: 05/17/2024  Chloride                  Value: 105(mmol/L)        Dt: 05/17/2024  Bicarbonate               Value: 26(mmol/L)         Dt: 05/17/2024  Anion Gap                 Value: 10(mmol/L)         Dt: 05/17/2024  Urea Nitrogen             Value: 27(mg/dL) (H)      Dt: 05/17/2024  Creatinine                Value: 0.75(mg/dL)        Dt: 05/17/2024  eGFR                      Value: >90(mL/min/1.73m*2) Dt: 05/17/2024  Calcium                   Value: 9.1(mg/dL)         Dt: 05/17/2024  Albumin                   Value: 4.0(g/dL)          Dt: 05/17/2024  Alkaline Phosphatase      Value: 62(U/L)            Dt: 05/17/2024  Total Protein             Value: 6.5(g/dL)          Dt: 05/17/2024  AST                       Value: 19(U/L)            Dt: 05/17/2024  Bilirubin, Total          Value: 1.5(mg/dL) (H)     Dt: 05/17/2024  ALT                        Value: 21(U/L)            Dt: 05/17/2024  Cholesterol               Value: 169(mg/dL)         Dt: 05/17/2024  HDL-Cholesterol           Value: 54.2(mg/dL)        Dt: 05/17/2024  Cholesterol/HDL Ratio     Value: 3.1                Dt: 05/17/2024  LDL Calculated            Value: 101(mg/dL) (H)     Dt: 05/17/2024  VLDL                      Value: 14(mg/dL)          Dt: 05/17/2024  Triglycerides             Value: 69(mg/dL)          Dt: 05/17/2024  Non HDL Cholesterol       Value: 115(mg/dL)         Dt: 05/17/2024  Magnesium                 Value: 2.13(mg/dL)        Dt: 05/17/2024  Thyroid Stimulating Horm* Value: 1.01(mIU/L)        Dt: 05/17/2024  Prostate Specific Antige* Value: 0.83(ng/mL)        Dt: 05/17/2024  ------------           Review of Systems   Constitutional:  Negative for activity change, appetite change, chills, diaphoresis, fatigue, fever and unexpected weight change.   HENT:  Negative for congestion, ear pain, hearing loss, nosebleeds, postnasal drip, rhinorrhea, sinus pressure, sneezing, sore throat, tinnitus, trouble swallowing and voice change.    Eyes:  Negative for photophobia, pain, discharge, redness, itching and visual disturbance.   Respiratory:  Negative for cough, choking, chest tightness, shortness of breath and wheezing.    Cardiovascular:  Negative for chest pain, palpitations and leg swelling.   Gastrointestinal:  Negative for abdominal distention, abdominal pain, blood in stool, constipation, diarrhea, nausea and vomiting.   Endocrine: Negative for cold intolerance, heat intolerance, polydipsia and polyuria.   Genitourinary:  Negative for dysuria, flank pain, frequency, hematuria and urgency.   Musculoskeletal:  Positive for arthritis. Negative for arthralgias, back pain, joint swelling, myalgias, neck pain and neck stiffness.   Skin:  Negative for rash and wound.   Allergic/Immunologic: Negative for immunocompromised state.   Neurological:  Negative for dizziness,  "tremors, seizures, syncope, facial asymmetry, speech difficulty, weakness, light-headedness, numbness and headaches.   Hematological:  Negative for adenopathy. Does not bruise/bleed easily.   Psychiatric/Behavioral:  Negative for agitation, behavioral problems, confusion, dysphoric mood, hallucinations, self-injury, sleep disturbance and suicidal ideas. The patient is not nervous/anxious.        Objective   /75 (BP Location: Left arm, Patient Position: Sitting, BP Cuff Size: Large adult)   Pulse 66   Temp 36.5 °C (97.7 °F) (Temporal)   Resp 18   Ht 1.664 m (5' 5.5\")   Wt 79.4 kg (175 lb)   SpO2 95%   BMI 28.68 kg/m²   Physical Exam  Constitutional:       General: He is not in acute distress.     Appearance: He is not ill-appearing or diaphoretic.   HENT:      Head: Normocephalic and atraumatic.      Right Ear: External ear normal.      Left Ear: External ear normal.      Nose: Nose normal. No rhinorrhea.   Eyes:      General: Lids are normal. No scleral icterus.        Right eye: No discharge.         Left eye: No discharge.      Conjunctiva/sclera: Conjunctivae normal.   Cardiovascular:      Rate and Rhythm: Regular rhythm.      Pulses: Normal pulses.      Heart sounds: No murmur heard.  Pulmonary:      Effort: Pulmonary effort is normal. No respiratory distress.      Breath sounds: No decreased breath sounds, wheezing, rhonchi or rales.   Abdominal:      General: Bowel sounds are normal. There is no distension.      Palpations: Abdomen is soft. There is no mass.      Tenderness: There is no abdominal tenderness. There is no guarding or rebound.   Musculoskeletal:         General: No swelling, tenderness or deformity.      Cervical back: No rigidity or tenderness.      Right lower leg: No edema.      Left lower leg: No edema.   Lymphadenopathy:      Cervical: No cervical adenopathy.      Upper Body:      Right upper body: No supraclavicular adenopathy.      Left upper body: No supraclavicular " adenopathy.   Skin:     General: Skin is warm and dry.      Coloration: Skin is not jaundiced or pale.      Findings: No erythema, lesion or rash.   Neurological:      General: No focal deficit present.      Mental Status: He is alert and oriented to person, place, and time.      Sensory: No sensory deficit.      Motor: No weakness or tremor.      Coordination: Coordination normal.      Gait: Gait normal.   Psychiatric:         Mood and Affect: Mood normal. Affect is not inappropriate.         Behavior: Behavior normal.         Assessment/Plan   Problem List Items Addressed This Visit       Osteoarthritis of hip    Relevant Orders    CBC and Auto Differential    Iron and TIBC    Follow Up In Primary Care - Health Maintenance     Other Visit Diagnoses       Preoperative examination    -  Primary    Relevant Orders    ECG 12 Lead (Completed)    Chronic hip pain, left        Relevant Orders    CBC and Auto Differential    Iron and TIBC    Follow Up In Primary Care - Health Maintenance    Screening for human immunodeficiency virus without presence of risk factors        Relevant Orders    HIV 1/2 Antigen/Antibody Screen with Reflex to Confirmation    Encounter for hepatitis C screening test for low risk patient        Relevant Orders    Hepatitis C antibody    Essential (primary) hypertension        Relevant Medications    metoprolol succinate XL (Toprol-XL) 25 mg 24 hr tablet        Assessment/Plan  63 y.o. male with planned surgery as above.    Known risk factors for perioperative complications: None    Difficulty with intubation is not anticipated.  Patient cleared for surgery from primary care point of view.    Current medications which may produce withdrawal symptoms if withheld perioperatively: Metoprolol.     1. Preoperative workup as follows ECG, hemoglobin, hematocrit, electrolytes, creatinine, glucose, liver function studies.    2. Change in medication regimen before surgery: Recommend hold all OTC supplements  and meloxicam,  last dose June 6.    May hold all other medications on morning of surgery.  (Takes all blood pressure medication including metoprolol in p.m.) may resume all medications postsurgically when okay with surgeon and taking orals adequately.    3. Prophylaxis for cardiac events with perioperative beta-blockers: should be considered, specific regimen per anesthesia.  4. Invasive hemodynamic monitoring perioperatively: at the discretion of anesthesiologist.  5. Deep vein thrombosis prophylaxis postoperatively:regimen to be chosen by surgical team.  6. Surveillance for postoperative MI with ECG immediately postoperatively and on postoperative days 1 and 2 AND troponin levels 24 hours postoperatively and on day 4 or hospital discharge (whichever comes first): at the discretion of anesthesiologist.

## 2024-05-20 NOTE — PATIENT INSTRUCTIONS
Recommend hold all OTC supplements and meloxicam, last dose June 6.    May hold all other medications on morning of surgery (Takes all blood pressure medication including metoprolol in p.m.).  May resume all medications postsurgically when okay with surgeon and taking orals adequately.

## 2024-05-21 ENCOUNTER — TRANSCRIBE ORDERS (OUTPATIENT)
Dept: ORTHOPEDIC SURGERY | Facility: CLINIC | Age: 64
End: 2024-05-21
Payer: COMMERCIAL

## 2024-05-21 DIAGNOSIS — M16.12 UNILATERAL PRIMARY OSTEOARTHRITIS, LEFT HIP: Primary | ICD-10-CM

## 2024-06-04 DIAGNOSIS — Z01.818 PREOP TESTING: Primary | ICD-10-CM

## 2024-06-04 RX ORDER — CHLORHEXIDINE GLUCONATE ORAL RINSE 1.2 MG/ML
15 SOLUTION DENTAL AS NEEDED
Qty: 120 ML | Refills: 0 | Status: SHIPPED | OUTPATIENT
Start: 2024-06-04 | End: 2024-06-18

## 2024-06-05 ENCOUNTER — HOSPITAL ENCOUNTER (OUTPATIENT)
Dept: RADIOLOGY | Facility: HOSPITAL | Age: 64
Discharge: HOME | End: 2024-06-05
Payer: COMMERCIAL

## 2024-06-05 ENCOUNTER — PRE-ADMISSION TESTING (OUTPATIENT)
Dept: PREADMISSION TESTING | Facility: HOSPITAL | Age: 64
End: 2024-06-05
Payer: COMMERCIAL

## 2024-06-05 VITALS
HEIGHT: 66 IN | RESPIRATION RATE: 16 BRPM | BODY MASS INDEX: 28.13 KG/M2 | SYSTOLIC BLOOD PRESSURE: 110 MMHG | OXYGEN SATURATION: 99 % | HEART RATE: 63 BPM | WEIGHT: 175.04 LBS | DIASTOLIC BLOOD PRESSURE: 75 MMHG

## 2024-06-05 DIAGNOSIS — M16.12 UNILATERAL PRIMARY OSTEOARTHRITIS, LEFT HIP: ICD-10-CM

## 2024-06-05 LAB
INR PPP: 0.9 (ref 0.9–1.1)
PROTHROMBIN TIME: 10.4 SECONDS (ref 9.8–12.8)

## 2024-06-05 PROCEDURE — 83036 HEMOGLOBIN GLYCOSYLATED A1C: CPT | Mod: ELYLAB

## 2024-06-05 PROCEDURE — 87081 CULTURE SCREEN ONLY: CPT | Mod: ELYLAB

## 2024-06-05 PROCEDURE — 36415 COLL VENOUS BLD VENIPUNCTURE: CPT

## 2024-06-05 PROCEDURE — 73700 CT LOWER EXTREMITY W/O DYE: CPT | Mod: LT

## 2024-06-05 PROCEDURE — 85610 PROTHROMBIN TIME: CPT

## 2024-06-05 RX ORDER — BISMUTH SUBSALICYLATE 262 MG
1 TABLET,CHEWABLE ORAL DAILY
COMMUNITY

## 2024-06-05 NOTE — PREPROCEDURE INSTRUCTIONS
Patient here for PAT visit; labs and MRSA swab obtained.  Lab/EKG done with PCP visit. Hibiclens skin product and joint instruction book given to and reviewed with patient.  Mouth wash instructions provided. Written pre-op instructions reviewed with patient. Pt to watch joint video at home and has walker available.

## 2024-06-06 LAB
EST. AVERAGE GLUCOSE BLD GHB EST-MCNC: 105 MG/DL
HBA1C MFR BLD: 5.3 %

## 2024-06-07 LAB — STAPHYLOCOCCUS SPEC CULT: NORMAL

## 2024-06-12 ENCOUNTER — OFFICE VISIT (OUTPATIENT)
Dept: ORTHOPEDIC SURGERY | Facility: CLINIC | Age: 64
End: 2024-06-12
Payer: COMMERCIAL

## 2024-06-12 DIAGNOSIS — M16.12 PRIMARY OSTEOARTHRITIS OF LEFT HIP: Primary | ICD-10-CM

## 2024-06-12 NOTE — PROGRESS NOTES
Subjective    Patient ID: Luis Alberto    Chief Complaint:   Chief Complaint   Patient presents with    Left Hip - Pre-op Exam     JC THR 06/17/24 @ [facility]     This patient has pain in the hip that is increased with activity and weightbearing, the patient walks with an antalgic gait at this time.  The pain is interfering with activities of daily living.  The patient has limited range of motion of the hip and pain with passive range of motion.  This x-ray demonstrates joint space narrowing, subchondral sclerosis, and osteophyte formation.  The patient has failed to respond to conservative treatment with medication therapy and supportive devices for period of at least 3 months.     This is the preop visit to discuss the risks and benefits of the total hip replacement surgery.  These risks were fully explained to the patient.  With this, and as any surgery, infection is a risk.  The risk for infection is usually between 1 and 2%.  This risk is even higher in diabetics, persons with rheumatoid arthritis, patients with previous surgery, patients on oral steroid medication, and obesity.  All of these issues were properly discussed.  We always assure all sterile techniques will be followed during the procedure.  Patient is also need to be on antibiotics for the next 2 years for any minor surgical procedure, even dental work.  For high risk patients this will be for lifetime.  Severe infections may require removal of the prosthesis.     It was also explained to the patient that there will be some blood loss during the procedure.  Transfusions although rare will only be given when absolutely medically necessary.     Pulmonary embolism and blood clots were also discussed with the patient.  We discussed that these can be fatal complications of the procedure.  Is explained to the patient that the use of thromboembolic stockings, foot pumps, incentive spirometer, early mobility, and the use of blood thinners for a period of time is  the standard of care.     Dislocations are discussed with the patient.  It is explained that the highest risk for dislocating the hip happens during the first 3 months after surgery.  These risks tend to decrease with time.  This risk is higher and revisions.  It is explained to the patient that hip precautions are very important and that these will be carried out throughout the lifetime with her prosthesis.  Intraoperatively, we will adjust the length of the leg to tighten the joint to help prevent dislocation.  This leg lengthening to stabilize the joint is usually no more than 1/4 inch but may be more or less to improve stability.     Loosening and wear of the prosthesis is also discussed.  The prosthesis normally lasts between 12 and 15 years on the average.  Revisions may be more complicated.     Fractures, though rare, they also occur intraoperatively.  These fractures may occur in the pelvis or the femur.  There may be nerve or arterial injuries as well and these are discussed in detail.  Lastly the benefits of spinal anesthesia were explained to the patient.     All of the patient's questions and concerns were answered in detail.     This note was prepared using voice recognition software.  The details of this note are correct and have been reviewed, and corrected to the best of my ability.  Some grammatical areas may persist related to the Dragon software     Bahman Hansen PA-C     [phone]

## 2024-06-13 DIAGNOSIS — I10 ESSENTIAL (PRIMARY) HYPERTENSION: ICD-10-CM

## 2024-06-14 PROCEDURE — RXMED WILLOW AMBULATORY MEDICATION CHARGE

## 2024-06-14 RX ORDER — METOPROLOL SUCCINATE 50 MG/1
50 TABLET, EXTENDED RELEASE ORAL NIGHTLY
Qty: 90 TABLET | Refills: 3 | OUTPATIENT
Start: 2024-06-14

## 2024-06-17 ENCOUNTER — APPOINTMENT (OUTPATIENT)
Dept: RADIOLOGY | Facility: HOSPITAL | Age: 64
End: 2024-06-17
Payer: COMMERCIAL

## 2024-06-17 ENCOUNTER — HOSPITAL ENCOUNTER (OUTPATIENT)
Facility: HOSPITAL | Age: 64
Discharge: HOME | End: 2024-06-18
Attending: ORTHOPAEDIC SURGERY | Admitting: ORTHOPAEDIC SURGERY
Payer: COMMERCIAL

## 2024-06-17 ENCOUNTER — ANESTHESIA EVENT (OUTPATIENT)
Dept: OPERATING ROOM | Facility: HOSPITAL | Age: 64
End: 2024-06-17
Payer: COMMERCIAL

## 2024-06-17 ENCOUNTER — ANESTHESIA (OUTPATIENT)
Dept: OPERATING ROOM | Facility: HOSPITAL | Age: 64
End: 2024-06-17
Payer: COMMERCIAL

## 2024-06-17 DIAGNOSIS — Z96.642 STATUS POST LEFT HIP REPLACEMENT: ICD-10-CM

## 2024-06-17 DIAGNOSIS — M16.12 UNILATERAL PRIMARY OSTEOARTHRITIS, LEFT HIP: Primary | ICD-10-CM

## 2024-06-17 PROCEDURE — 2500000004 HC RX 250 GENERAL PHARMACY W/ HCPCS (ALT 636 FOR OP/ED): Performed by: ANESTHESIOLOGY

## 2024-06-17 PROCEDURE — 73501 X-RAY EXAM HIP UNI 1 VIEW: CPT | Mod: LEFT SIDE | Performed by: RADIOLOGY

## 2024-06-17 PROCEDURE — 3700000002 HC GENERAL ANESTHESIA TIME - EACH INCREMENTAL 1 MINUTE: Performed by: ORTHOPAEDIC SURGERY

## 2024-06-17 PROCEDURE — C1776 JOINT DEVICE (IMPLANTABLE): HCPCS | Performed by: ORTHOPAEDIC SURGERY

## 2024-06-17 PROCEDURE — 3700000001 HC GENERAL ANESTHESIA TIME - INITIAL BASE CHARGE: Performed by: ORTHOPAEDIC SURGERY

## 2024-06-17 PROCEDURE — A6213 FOAM DRG >16<=48 SQ IN W/BDR: HCPCS | Performed by: ORTHOPAEDIC SURGERY

## 2024-06-17 PROCEDURE — 97110 THERAPEUTIC EXERCISES: CPT | Mod: GP | Performed by: PHYSICAL THERAPIST

## 2024-06-17 PROCEDURE — 2500000004 HC RX 250 GENERAL PHARMACY W/ HCPCS (ALT 636 FOR OP/ED): Mod: JZ | Performed by: ORTHOPAEDIC SURGERY

## 2024-06-17 PROCEDURE — 2500000002 HC RX 250 W HCPCS SELF ADMINISTERED DRUGS (ALT 637 FOR MEDICARE OP, ALT 636 FOR OP/ED): Performed by: ORTHOPAEDIC SURGERY

## 2024-06-17 PROCEDURE — 7100000011 HC EXTENDED STAY RECOVERY HOURLY - NURSING UNIT

## 2024-06-17 PROCEDURE — 7100000002 HC RECOVERY ROOM TIME - EACH INCREMENTAL 1 MINUTE: Performed by: ORTHOPAEDIC SURGERY

## 2024-06-17 PROCEDURE — 97161 PT EVAL LOW COMPLEX 20 MIN: CPT | Mod: GP | Performed by: PHYSICAL THERAPIST

## 2024-06-17 PROCEDURE — 73501 X-RAY EXAM HIP UNI 1 VIEW: CPT | Mod: LT

## 2024-06-17 PROCEDURE — 27130 TOTAL HIP ARTHROPLASTY: CPT | Performed by: ORTHOPAEDIC SURGERY

## 2024-06-17 PROCEDURE — 2500000001 HC RX 250 WO HCPCS SELF ADMINISTERED DRUGS (ALT 637 FOR MEDICARE OP): Performed by: ORTHOPAEDIC SURGERY

## 2024-06-17 PROCEDURE — 2780000003 HC OR 278 NO HCPCS: Performed by: ORTHOPAEDIC SURGERY

## 2024-06-17 PROCEDURE — A4217 STERILE WATER/SALINE, 500 ML: HCPCS | Performed by: ORTHOPAEDIC SURGERY

## 2024-06-17 PROCEDURE — 3600000018 HC OR TIME - INITIAL BASE CHARGE - PROCEDURE LEVEL SIX: Performed by: ORTHOPAEDIC SURGERY

## 2024-06-17 PROCEDURE — 2720000007 HC OR 272 NO HCPCS: Performed by: ORTHOPAEDIC SURGERY

## 2024-06-17 PROCEDURE — 2500000004 HC RX 250 GENERAL PHARMACY W/ HCPCS (ALT 636 FOR OP/ED): Performed by: ORTHOPAEDIC SURGERY

## 2024-06-17 PROCEDURE — 3600000017 HC OR TIME - EACH INCREMENTAL 1 MINUTE - PROCEDURE LEVEL SIX: Performed by: ORTHOPAEDIC SURGERY

## 2024-06-17 PROCEDURE — 27130 TOTAL HIP ARTHROPLASTY: CPT | Performed by: PHYSICIAN ASSISTANT

## 2024-06-17 PROCEDURE — 7100000001 HC RECOVERY ROOM TIME - INITIAL BASE CHARGE: Performed by: ORTHOPAEDIC SURGERY

## 2024-06-17 DEVICE — INSERT, TRIDENT X3 POLYETHYLENE, 10 DEG, 36MM E: Type: IMPLANTABLE DEVICE | Site: HIP | Status: FUNCTIONAL

## 2024-06-17 DEVICE — CERAMIC V40 FEMORAL HEAD
Type: IMPLANTABLE DEVICE | Site: HIP | Status: FUNCTIONAL
Brand: BIOLOX

## 2024-06-17 DEVICE — 127 DEGREE NECK ANGLE HIP STEM
Type: IMPLANTABLE DEVICE | Site: HIP | Status: FUNCTIONAL
Brand: ACCOLADE

## 2024-06-17 DEVICE — TRIDENT II CLUSTERHOLE HA ACETABULAR SHELL 52E
Type: IMPLANTABLE DEVICE | Site: HIP | Status: FUNCTIONAL
Brand: TRIDENT II

## 2024-06-17 RX ORDER — CEFAZOLIN SODIUM 2 G/100ML
2 INJECTION, SOLUTION INTRAVENOUS EVERY 8 HOURS
Status: COMPLETED | OUTPATIENT
Start: 2024-06-17 | End: 2024-06-18

## 2024-06-17 RX ORDER — OXYCODONE HYDROCHLORIDE 5 MG/1
10 TABLET ORAL EVERY 4 HOURS PRN
Status: DISCONTINUED | OUTPATIENT
Start: 2024-06-17 | End: 2024-06-18 | Stop reason: HOSPADM

## 2024-06-17 RX ORDER — OXYCODONE HYDROCHLORIDE 5 MG/1
5 TABLET ORAL EVERY 4 HOURS PRN
Status: DISCONTINUED | OUTPATIENT
Start: 2024-06-17 | End: 2024-06-18 | Stop reason: HOSPADM

## 2024-06-17 RX ORDER — LABETALOL HYDROCHLORIDE 5 MG/ML
5 INJECTION, SOLUTION INTRAVENOUS ONCE AS NEEDED
Status: DISCONTINUED | OUTPATIENT
Start: 2024-06-17 | End: 2024-06-17 | Stop reason: HOSPADM

## 2024-06-17 RX ORDER — CEFAZOLIN SODIUM 2 G/100ML
2 INJECTION, SOLUTION INTRAVENOUS ONCE
Status: COMPLETED | OUTPATIENT
Start: 2024-06-17 | End: 2024-06-17

## 2024-06-17 RX ORDER — SODIUM CHLORIDE 0.9 G/100ML
IRRIGANT IRRIGATION AS NEEDED
Status: DISCONTINUED | OUTPATIENT
Start: 2024-06-17 | End: 2024-06-17 | Stop reason: HOSPADM

## 2024-06-17 RX ORDER — DIPHENHYDRAMINE HCL 25 MG
25 TABLET ORAL EVERY 6 HOURS PRN
Status: DISCONTINUED | OUTPATIENT
Start: 2024-06-17 | End: 2024-06-18 | Stop reason: HOSPADM

## 2024-06-17 RX ORDER — DROPERIDOL 2.5 MG/ML
0.62 INJECTION, SOLUTION INTRAMUSCULAR; INTRAVENOUS ONCE AS NEEDED
Status: DISCONTINUED | OUTPATIENT
Start: 2024-06-17 | End: 2024-06-17 | Stop reason: HOSPADM

## 2024-06-17 RX ORDER — LIDOCAINE HYDROCHLORIDE 10 MG/ML
0.1 INJECTION, SOLUTION EPIDURAL; INFILTRATION; INTRACAUDAL; PERINEURAL ONCE
Status: DISCONTINUED | OUTPATIENT
Start: 2024-06-17 | End: 2024-06-17 | Stop reason: HOSPADM

## 2024-06-17 RX ORDER — TRANEXAMIC ACID 650 MG/1
1950 TABLET ORAL ONCE
Status: COMPLETED | OUTPATIENT
Start: 2024-06-18 | End: 2024-06-18

## 2024-06-17 RX ORDER — PROPOFOL 10 MG/ML
INJECTION, EMULSION INTRAVENOUS CONTINUOUS PRN
Status: DISCONTINUED | OUTPATIENT
Start: 2024-06-17 | End: 2024-06-17

## 2024-06-17 RX ORDER — MEPERIDINE HYDROCHLORIDE 25 MG/ML
12.5 INJECTION INTRAMUSCULAR; INTRAVENOUS; SUBCUTANEOUS EVERY 10 MIN PRN
Status: DISCONTINUED | OUTPATIENT
Start: 2024-06-17 | End: 2024-06-17 | Stop reason: HOSPADM

## 2024-06-17 RX ORDER — TRANEXAMIC ACID 650 MG/1
1950 TABLET ORAL ONCE
Status: COMPLETED | OUTPATIENT
Start: 2024-06-17 | End: 2024-06-17

## 2024-06-17 RX ORDER — MIDAZOLAM HYDROCHLORIDE 1 MG/ML
INJECTION, SOLUTION INTRAMUSCULAR; INTRAVENOUS AS NEEDED
Status: DISCONTINUED | OUTPATIENT
Start: 2024-06-17 | End: 2024-06-17

## 2024-06-17 RX ORDER — KETOROLAC TROMETHAMINE 30 MG/ML
15 INJECTION, SOLUTION INTRAMUSCULAR; INTRAVENOUS EVERY 6 HOURS
Status: DISPENSED | OUTPATIENT
Start: 2024-06-17 | End: 2024-06-18

## 2024-06-17 RX ORDER — KETOROLAC TROMETHAMINE 30 MG/ML
INJECTION, SOLUTION INTRAMUSCULAR; INTRAVENOUS AS NEEDED
Status: DISCONTINUED | OUTPATIENT
Start: 2024-06-17 | End: 2024-06-17

## 2024-06-17 RX ORDER — ROPIVACAINE/EPI/CLONIDINE/KET 2.46-0.005
SYRINGE (ML) INJECTION AS NEEDED
Status: DISCONTINUED | OUTPATIENT
Start: 2024-06-17 | End: 2024-06-17 | Stop reason: HOSPADM

## 2024-06-17 RX ORDER — HYDROMORPHONE HYDROCHLORIDE 1 MG/ML
0.5 INJECTION, SOLUTION INTRAMUSCULAR; INTRAVENOUS; SUBCUTANEOUS EVERY 4 HOURS PRN
Status: CANCELLED | OUTPATIENT
Start: 2024-06-17

## 2024-06-17 RX ORDER — PROCHLORPERAZINE 25 MG/1
25 SUPPOSITORY RECTAL EVERY 12 HOURS PRN
Status: DISCONTINUED | OUTPATIENT
Start: 2024-06-17 | End: 2024-06-18 | Stop reason: HOSPADM

## 2024-06-17 RX ORDER — MIDAZOLAM HYDROCHLORIDE 1 MG/ML
1 INJECTION, SOLUTION INTRAMUSCULAR; INTRAVENOUS ONCE AS NEEDED
Status: DISCONTINUED | OUTPATIENT
Start: 2024-06-17 | End: 2024-06-17 | Stop reason: HOSPADM

## 2024-06-17 RX ORDER — ACETAMINOPHEN 325 MG/1
650 TABLET ORAL EVERY 6 HOURS SCHEDULED
Status: DISCONTINUED | OUTPATIENT
Start: 2024-06-17 | End: 2024-06-18 | Stop reason: HOSPADM

## 2024-06-17 RX ORDER — OXYCODONE HYDROCHLORIDE 5 MG/1
5 TABLET ORAL EVERY 4 HOURS PRN
Status: CANCELLED | OUTPATIENT
Start: 2024-06-17

## 2024-06-17 RX ORDER — ONDANSETRON 4 MG/1
4 TABLET, ORALLY DISINTEGRATING ORAL EVERY 8 HOURS PRN
Status: DISCONTINUED | OUTPATIENT
Start: 2024-06-17 | End: 2024-06-18 | Stop reason: HOSPADM

## 2024-06-17 RX ORDER — SODIUM CHLORIDE, SODIUM LACTATE, POTASSIUM CHLORIDE, CALCIUM CHLORIDE 600; 310; 30; 20 MG/100ML; MG/100ML; MG/100ML; MG/100ML
100 INJECTION, SOLUTION INTRAVENOUS CONTINUOUS
Status: DISCONTINUED | OUTPATIENT
Start: 2024-06-17 | End: 2024-06-17 | Stop reason: HOSPADM

## 2024-06-17 RX ORDER — AMLODIPINE BESYLATE 5 MG/1
10 TABLET ORAL NIGHTLY
Status: DISCONTINUED | OUTPATIENT
Start: 2024-06-17 | End: 2024-06-18 | Stop reason: HOSPADM

## 2024-06-17 RX ORDER — BUPIVACAINE HYDROCHLORIDE 7.5 MG/ML
INJECTION, SOLUTION INTRASPINAL AS NEEDED
Status: DISCONTINUED | OUTPATIENT
Start: 2024-06-17 | End: 2024-06-17

## 2024-06-17 RX ORDER — FENTANYL CITRATE 50 UG/ML
INJECTION, SOLUTION INTRAMUSCULAR; INTRAVENOUS AS NEEDED
Status: DISCONTINUED | OUTPATIENT
Start: 2024-06-17 | End: 2024-06-17

## 2024-06-17 RX ORDER — ALBUTEROL SULFATE 0.83 MG/ML
2.5 SOLUTION RESPIRATORY (INHALATION) ONCE
Status: DISCONTINUED | OUTPATIENT
Start: 2024-06-17 | End: 2024-06-17 | Stop reason: HOSPADM

## 2024-06-17 RX ORDER — ACETAMINOPHEN 325 MG/1
975 TABLET ORAL ONCE
Status: COMPLETED | OUTPATIENT
Start: 2024-06-17 | End: 2024-06-17

## 2024-06-17 RX ORDER — CELECOXIB 200 MG/1
200 CAPSULE ORAL ONCE
Status: COMPLETED | OUTPATIENT
Start: 2024-06-17 | End: 2024-06-17

## 2024-06-17 RX ORDER — METOPROLOL SUCCINATE 25 MG/1
25 TABLET, EXTENDED RELEASE ORAL NIGHTLY
Status: DISCONTINUED | OUTPATIENT
Start: 2024-06-17 | End: 2024-06-18 | Stop reason: HOSPADM

## 2024-06-17 RX ORDER — GABAPENTIN 300 MG/1
600 CAPSULE ORAL ONCE
Status: COMPLETED | OUTPATIENT
Start: 2024-06-17 | End: 2024-06-17

## 2024-06-17 RX ORDER — ONDANSETRON HYDROCHLORIDE 2 MG/ML
4 INJECTION, SOLUTION INTRAVENOUS EVERY 8 HOURS PRN
Status: DISCONTINUED | OUTPATIENT
Start: 2024-06-17 | End: 2024-06-18 | Stop reason: HOSPADM

## 2024-06-17 RX ORDER — PROCHLORPERAZINE EDISYLATE 5 MG/ML
10 INJECTION INTRAMUSCULAR; INTRAVENOUS EVERY 6 HOURS PRN
Status: DISCONTINUED | OUTPATIENT
Start: 2024-06-17 | End: 2024-06-18 | Stop reason: HOSPADM

## 2024-06-17 RX ORDER — PROPOFOL 10 MG/ML
INJECTION, EMULSION INTRAVENOUS AS NEEDED
Status: DISCONTINUED | OUTPATIENT
Start: 2024-06-17 | End: 2024-06-17

## 2024-06-17 RX ORDER — BISACODYL 5 MG
10 TABLET, DELAYED RELEASE (ENTERIC COATED) ORAL DAILY PRN
Status: DISCONTINUED | OUTPATIENT
Start: 2024-06-17 | End: 2024-06-18 | Stop reason: HOSPADM

## 2024-06-17 RX ORDER — ASPIRIN 81 MG/1
81 TABLET ORAL 2 TIMES DAILY
Status: DISCONTINUED | OUTPATIENT
Start: 2024-06-17 | End: 2024-06-18 | Stop reason: HOSPADM

## 2024-06-17 RX ORDER — PROCHLORPERAZINE MALEATE 5 MG
10 TABLET ORAL EVERY 6 HOURS PRN
Status: DISCONTINUED | OUTPATIENT
Start: 2024-06-17 | End: 2024-06-18 | Stop reason: HOSPADM

## 2024-06-17 RX ORDER — WATER 1 ML/ML
IRRIGANT IRRIGATION AS NEEDED
Status: DISCONTINUED | OUTPATIENT
Start: 2024-06-17 | End: 2024-06-17 | Stop reason: HOSPADM

## 2024-06-17 RX ORDER — SODIUM CHLORIDE, SODIUM LACTATE, POTASSIUM CHLORIDE, CALCIUM CHLORIDE 600; 310; 30; 20 MG/100ML; MG/100ML; MG/100ML; MG/100ML
50 INJECTION, SOLUTION INTRAVENOUS CONTINUOUS
Status: DISCONTINUED | OUTPATIENT
Start: 2024-06-17 | End: 2024-06-18 | Stop reason: HOSPADM

## 2024-06-17 RX ORDER — DOCUSATE SODIUM 100 MG/1
100 CAPSULE, LIQUID FILLED ORAL 2 TIMES DAILY
Status: DISCONTINUED | OUTPATIENT
Start: 2024-06-17 | End: 2024-06-18 | Stop reason: HOSPADM

## 2024-06-17 RX ORDER — CYCLOBENZAPRINE HCL 10 MG
10 TABLET ORAL 3 TIMES DAILY PRN
Status: DISCONTINUED | OUTPATIENT
Start: 2024-06-17 | End: 2024-06-18 | Stop reason: HOSPADM

## 2024-06-17 RX ORDER — SODIUM CHLORIDE, SODIUM LACTATE, POTASSIUM CHLORIDE, CALCIUM CHLORIDE 600; 310; 30; 20 MG/100ML; MG/100ML; MG/100ML; MG/100ML
50 INJECTION, SOLUTION INTRAVENOUS CONTINUOUS
Status: ACTIVE | OUTPATIENT
Start: 2024-06-17 | End: 2024-06-18

## 2024-06-17 SDOH — SOCIAL STABILITY: SOCIAL INSECURITY: ABUSE: ADULT

## 2024-06-17 SDOH — SOCIAL STABILITY: SOCIAL INSECURITY
WITHIN THE LAST YEAR, HAVE TO BEEN RAPED OR FORCED TO HAVE ANY KIND OF SEXUAL ACTIVITY BY YOUR PARTNER OR EX-PARTNER?: NO

## 2024-06-17 SDOH — ECONOMIC STABILITY: FOOD INSECURITY: WITHIN THE PAST 12 MONTHS, THE FOOD YOU BOUGHT JUST DIDN'T LAST AND YOU DIDN'T HAVE MONEY TO GET MORE.: NEVER TRUE

## 2024-06-17 SDOH — ECONOMIC STABILITY: HOUSING INSECURITY
IN THE LAST 12 MONTHS, WAS THERE A TIME WHEN YOU DID NOT HAVE A STEADY PLACE TO SLEEP OR SLEPT IN A SHELTER (INCLUDING NOW)?: NO

## 2024-06-17 SDOH — SOCIAL STABILITY: SOCIAL INSECURITY: ARE YOU OR HAVE YOU BEEN THREATENED OR ABUSED PHYSICALLY, EMOTIONALLY, OR SEXUALLY BY ANYONE?: NO

## 2024-06-17 SDOH — SOCIAL STABILITY: SOCIAL NETWORK
IN A TYPICAL WEEK, HOW MANY TIMES DO YOU TALK ON THE PHONE WITH FAMILY, FRIENDS, OR NEIGHBORS?: MORE THAN THREE TIMES A WEEK

## 2024-06-17 SDOH — ECONOMIC STABILITY: INCOME INSECURITY: IN THE LAST 12 MONTHS, WAS THERE A TIME WHEN YOU WERE NOT ABLE TO PAY THE MORTGAGE OR RENT ON TIME?: NO

## 2024-06-17 SDOH — SOCIAL STABILITY: SOCIAL INSECURITY: HAVE YOU HAD ANY THOUGHTS OF HARMING ANYONE ELSE?: NO

## 2024-06-17 SDOH — HEALTH STABILITY: MENTAL HEALTH
STRESS IS WHEN SOMEONE FEELS TENSE, NERVOUS, ANXIOUS, OR CAN'T SLEEP AT NIGHT BECAUSE THEIR MIND IS TROUBLED. HOW STRESSED ARE YOU?: NOT AT ALL

## 2024-06-17 SDOH — SOCIAL STABILITY: SOCIAL INSECURITY: ARE THERE ANY APPARENT SIGNS OF INJURIES/BEHAVIORS THAT COULD BE RELATED TO ABUSE/NEGLECT?: NO

## 2024-06-17 SDOH — HEALTH STABILITY: PHYSICAL HEALTH: ON AVERAGE, HOW MANY DAYS PER WEEK DO YOU ENGAGE IN MODERATE TO STRENUOUS EXERCISE (LIKE A BRISK WALK)?: 3 DAYS

## 2024-06-17 SDOH — SOCIAL STABILITY: SOCIAL NETWORK
DO YOU BELONG TO ANY CLUBS OR ORGANIZATIONS SUCH AS CHURCH GROUPS UNIONS, FRATERNAL OR ATHLETIC GROUPS, OR SCHOOL GROUPS?: NO

## 2024-06-17 SDOH — SOCIAL STABILITY: SOCIAL INSECURITY: DOES ANYONE TRY TO KEEP YOU FROM HAVING/CONTACTING OTHER FRIENDS OR DOING THINGS OUTSIDE YOUR HOME?: NO

## 2024-06-17 SDOH — SOCIAL STABILITY: SOCIAL NETWORK: HOW OFTEN DO YOU ATTENT MEETINGS OF THE CLUB OR ORGANIZATION YOU BELONG TO?: NEVER

## 2024-06-17 SDOH — SOCIAL STABILITY: SOCIAL INSECURITY: HAS ANYONE EVER THREATENED TO HURT YOUR FAMILY OR YOUR PETS?: NO

## 2024-06-17 SDOH — SOCIAL STABILITY: SOCIAL NETWORK: HOW OFTEN DO YOU GET TOGETHER WITH FRIENDS OR RELATIVES?: ONCE A WEEK

## 2024-06-17 SDOH — SOCIAL STABILITY: SOCIAL INSECURITY: WITHIN THE LAST YEAR, HAVE YOU BEEN AFRAID OF YOUR PARTNER OR EX-PARTNER?: NO

## 2024-06-17 SDOH — ECONOMIC STABILITY: INCOME INSECURITY: IN THE PAST 12 MONTHS, HAS THE ELECTRIC, GAS, OIL, OR WATER COMPANY THREATENED TO SHUT OFF SERVICE IN YOUR HOME?: NO

## 2024-06-17 SDOH — ECONOMIC STABILITY: HOUSING INSECURITY: IN THE LAST 12 MONTHS, HOW MANY PLACES HAVE YOU LIVED?: 1

## 2024-06-17 SDOH — SOCIAL STABILITY: SOCIAL INSECURITY
WITHIN THE LAST YEAR, HAVE YOU BEEN KICKED, HIT, SLAPPED, OR OTHERWISE PHYSICALLY HURT BY YOUR PARTNER OR EX-PARTNER?: NO

## 2024-06-17 SDOH — HEALTH STABILITY: MENTAL HEALTH: CURRENT SMOKER: 0

## 2024-06-17 SDOH — ECONOMIC STABILITY: FOOD INSECURITY: WITHIN THE PAST 12 MONTHS, YOU WORRIED THAT YOUR FOOD WOULD RUN OUT BEFORE YOU GOT MONEY TO BUY MORE.: NEVER TRUE

## 2024-06-17 SDOH — SOCIAL STABILITY: SOCIAL INSECURITY: HAVE YOU HAD THOUGHTS OF HARMING ANYONE ELSE?: NO

## 2024-06-17 SDOH — HEALTH STABILITY: MENTAL HEALTH
HOW OFTEN DO YOU NEED TO HAVE SOMEONE HELP YOU WHEN YOU READ INSTRUCTIONS, PAMPHLETS, OR OTHER WRITTEN MATERIAL FROM YOUR DOCTOR OR PHARMACY?: NEVER

## 2024-06-17 SDOH — HEALTH STABILITY: PHYSICAL HEALTH: ON AVERAGE, HOW MANY MINUTES DO YOU ENGAGE IN EXERCISE AT THIS LEVEL?: 10 MIN

## 2024-06-17 SDOH — ECONOMIC STABILITY: INCOME INSECURITY: HOW HARD IS IT FOR YOU TO PAY FOR THE VERY BASICS LIKE FOOD, HOUSING, MEDICAL CARE, AND HEATING?: NOT HARD AT ALL

## 2024-06-17 SDOH — SOCIAL STABILITY: SOCIAL NETWORK: ARE YOU MARRIED, WIDOWED, DIVORCED, SEPARATED, NEVER MARRIED, OR LIVING WITH A PARTNER?: PATIENT DECLINED

## 2024-06-17 SDOH — SOCIAL STABILITY: SOCIAL INSECURITY: WITHIN THE LAST YEAR, HAVE YOU BEEN HUMILIATED OR EMOTIONALLY ABUSED IN OTHER WAYS BY YOUR PARTNER OR EX-PARTNER?: NO

## 2024-06-17 SDOH — SOCIAL STABILITY: SOCIAL INSECURITY: WERE YOU ABLE TO COMPLETE ALL THE BEHAVIORAL HEALTH SCREENINGS?: YES

## 2024-06-17 SDOH — SOCIAL STABILITY: SOCIAL INSECURITY: DO YOU FEEL UNSAFE GOING BACK TO THE PLACE WHERE YOU ARE LIVING?: NO

## 2024-06-17 SDOH — ECONOMIC STABILITY: TRANSPORTATION INSECURITY
IN THE PAST 12 MONTHS, HAS THE LACK OF TRANSPORTATION KEPT YOU FROM MEDICAL APPOINTMENTS OR FROM GETTING MEDICATIONS?: NO

## 2024-06-17 SDOH — SOCIAL STABILITY: SOCIAL INSECURITY: DO YOU FEEL ANYONE HAS EXPLOITED OR TAKEN ADVANTAGE OF YOU FINANCIALLY OR OF YOUR PERSONAL PROPERTY?: NO

## 2024-06-17 SDOH — SOCIAL STABILITY: SOCIAL NETWORK: HOW OFTEN DO YOU ATTEND CHURCH OR RELIGIOUS SERVICES?: MORE THAN 4 TIMES PER YEAR

## 2024-06-17 SDOH — ECONOMIC STABILITY: TRANSPORTATION INSECURITY
IN THE PAST 12 MONTHS, HAS LACK OF TRANSPORTATION KEPT YOU FROM MEETINGS, WORK, OR FROM GETTING THINGS NEEDED FOR DAILY LIVING?: NO

## 2024-06-17 ASSESSMENT — ENCOUNTER SYMPTOMS
LOSS OF SENSATION IN FEET: 0
DEPRESSION: 0
OCCASIONAL FEELINGS OF UNSTEADINESS: 1

## 2024-06-17 ASSESSMENT — ACTIVITIES OF DAILY LIVING (ADL)
DRESSING YOURSELF: INDEPENDENT
TOILETING: INDEPENDENT
HEARING - LEFT EAR: FUNCTIONAL
WALKS IN HOME: INDEPENDENT
HEARING - RIGHT EAR: FUNCTIONAL
BATHING: INDEPENDENT
JUDGMENT_ADEQUATE_SAFELY_COMPLETE_DAILY_ACTIVITIES: YES
LACK_OF_TRANSPORTATION: NO
GROOMING: INDEPENDENT
PATIENT'S MEMORY ADEQUATE TO SAFELY COMPLETE DAILY ACTIVITIES?: YES
FEEDING YOURSELF: INDEPENDENT
ADEQUATE_TO_COMPLETE_ADL: YES

## 2024-06-17 ASSESSMENT — COGNITIVE AND FUNCTIONAL STATUS - GENERAL
MOVING FROM LYING ON BACK TO SITTING ON SIDE OF FLAT BED WITH BEDRAILS: A LITTLE
WALKING IN HOSPITAL ROOM: TOTAL
TURNING FROM BACK TO SIDE WHILE IN FLAT BAD: A LITTLE
MOVING TO AND FROM BED TO CHAIR: TOTAL
STANDING UP FROM CHAIR USING ARMS: A LOT
MOBILITY SCORE: 11
MOVING TO AND FROM BED TO CHAIR: A LOT
DAILY ACTIVITIY SCORE: 18
PATIENT BASELINE BEDBOUND: NO
WALKING IN HOSPITAL ROOM: A LOT
CLIMB 3 TO 5 STEPS WITH RAILING: TOTAL
MOVING FROM LYING ON BACK TO SITTING ON SIDE OF FLAT BED WITH BEDRAILS: A LITTLE
TURNING FROM BACK TO SIDE WHILE IN FLAT BAD: A LITTLE
TOILETING: A LITTLE
PERSONAL GROOMING: A LITTLE
TOILETING: A LITTLE
CLIMB 3 TO 5 STEPS WITH RAILING: A LOT
HELP NEEDED FOR BATHING: A LITTLE
MOVING TO AND FROM BED TO CHAIR: A LOT
MOBILITY SCORE: 14
CLIMB 3 TO 5 STEPS WITH RAILING: TOTAL
TURNING FROM BACK TO SIDE WHILE IN FLAT BAD: A LOT
DRESSING REGULAR UPPER BODY CLOTHING: A LITTLE
MOBILITY SCORE: 11
DRESSING REGULAR LOWER BODY CLOTHING: A LOT
STANDING UP FROM CHAIR USING ARMS: A LOT
STANDING UP FROM CHAIR USING ARMS: A LOT
MOVING FROM LYING ON BACK TO SITTING ON SIDE OF FLAT BED WITH BEDRAILS: A LITTLE
DAILY ACTIVITIY SCORE: 22
DRESSING REGULAR LOWER BODY CLOTHING: A LITTLE
WALKING IN HOSPITAL ROOM: TOTAL

## 2024-06-17 ASSESSMENT — PAIN SCALES - GENERAL
PAINLEVEL_OUTOF10: 0 - NO PAIN
PAINLEVEL_OUTOF10: 2
PAINLEVEL_OUTOF10: 0 - NO PAIN
PAIN_LEVEL: 1
PAINLEVEL_OUTOF10: 6
PAINLEVEL_OUTOF10: 0 - NO PAIN
PAINLEVEL_OUTOF10: 0 - NO PAIN
PAINLEVEL_OUTOF10: 6
PAINLEVEL_OUTOF10: 0 - NO PAIN
PAINLEVEL_OUTOF10: 3
PAINLEVEL_OUTOF10: 0 - NO PAIN

## 2024-06-17 ASSESSMENT — PAIN - FUNCTIONAL ASSESSMENT
PAIN_FUNCTIONAL_ASSESSMENT: 0-10

## 2024-06-17 ASSESSMENT — PAIN DESCRIPTION - ORIENTATION: ORIENTATION: LEFT

## 2024-06-17 ASSESSMENT — LIFESTYLE VARIABLES
SUBSTANCE_ABUSE_PAST_12_MONTHS: NO
SKIP TO QUESTIONS 9-10: 1
AUDIT-C TOTAL SCORE: 0
PRESCIPTION_ABUSE_PAST_12_MONTHS: NO
AUDIT-C TOTAL SCORE: 0
HOW OFTEN DO YOU HAVE 6 OR MORE DRINKS ON ONE OCCASION: NEVER
HOW OFTEN DO YOU HAVE A DRINK CONTAINING ALCOHOL: NEVER
HOW MANY STANDARD DRINKS CONTAINING ALCOHOL DO YOU HAVE ON A TYPICAL DAY: PATIENT DOES NOT DRINK

## 2024-06-17 ASSESSMENT — PAIN DESCRIPTION - DESCRIPTORS
DESCRIPTORS: DISCOMFORT
DESCRIPTORS: ACHING

## 2024-06-17 ASSESSMENT — COLUMBIA-SUICIDE SEVERITY RATING SCALE - C-SSRS
6. HAVE YOU EVER DONE ANYTHING, STARTED TO DO ANYTHING, OR PREPARED TO DO ANYTHING TO END YOUR LIFE?: NO
1. IN THE PAST MONTH, HAVE YOU WISHED YOU WERE DEAD OR WISHED YOU COULD GO TO SLEEP AND NOT WAKE UP?: NO
2. HAVE YOU ACTUALLY HAD ANY THOUGHTS OF KILLING YOURSELF?: NO

## 2024-06-17 ASSESSMENT — PATIENT HEALTH QUESTIONNAIRE - PHQ9
2. FEELING DOWN, DEPRESSED OR HOPELESS: NOT AT ALL
SUM OF ALL RESPONSES TO PHQ9 QUESTIONS 1 & 2: 0
1. LITTLE INTEREST OR PLEASURE IN DOING THINGS: NOT AT ALL

## 2024-06-17 ASSESSMENT — PAIN DESCRIPTION - LOCATION: LOCATION: HIP

## 2024-06-17 NOTE — ANESTHESIA POSTPROCEDURE EVALUATION
Patient: Luis Alberto Lal    Procedure Summary       Date: 06/17/24 Room / Location: [facility] 11 / [facility]    Anesthesia Start: 1004 Anesthesia Stop: 1112    Procedure: Total Hip Arthroplasty (Left: Hip) Diagnosis:       Unilateral primary osteoarthritis, left hip      (Unilateral primary osteoarthritis, left hip [M16.12])    Surgeons: Jake West MD Responsible Provider: Doroteo An MD    Anesthesia Type: MAC, spinal ASA Status: 2            Anesthesia Type: MAC, spinal    Vitals Value Taken Time   /66 06/17/24 1155   Temp 36.2 °C (97.2 °F) 06/17/24 1145   Pulse 60 06/17/24 1203   Resp 23 06/17/24 1203   SpO2 93 % 06/17/24 1203   Vitals shown include unfiled device data.    Anesthesia Post Evaluation    Patient location during evaluation: PACU  Patient participation: complete - patient participated  Level of consciousness: awake and alert  Pain score: 1  Pain management: satisfactory to patient  Airway patency: patent  Cardiovascular status: stable  Respiratory status: acceptable  Hydration status: acceptable  Postoperative Nausea and Vomiting: none        No notable events documented.

## 2024-06-17 NOTE — ANESTHESIA PREPROCEDURE EVALUATION
Patient: Luis Alberto Lal    Procedure Information       Date/Time: 06/17/24 0930    Procedure: Arthroplasty Total Hip Robot-Assisted (Left: Hip) - [employer]    Location: [facility]  / [facility] OR    Surgeons: Jake West MD            Relevant Problems   Anesthesia (within normal limits)      Cardiac   (+) Primary hypertension      Pulmonary (within normal limits)      Neuro (within normal limits)      GI (within normal limits)      /Renal   (+) Benign prostatic hyperplasia with urinary frequency      Liver (within normal limits)      Endocrine (within normal limits)      Hematology (within normal limits)      Musculoskeletal   (+) Degenerative arthritis of lumbar spine   (+) Osteoarthritis of hip   (+) Unilateral primary osteoarthritis, left hip      HEENT (within normal limits)      ID (within normal limits)      Skin (within normal limits)      GYN (within normal limits)       Clinical information reviewed:   Tobacco  Allergies  Meds   Med Hx  Surg Hx   Fam Hx  Soc Hx        NPO Detail:  NPO/Void Status  Date of Last Liquid: 06/16/24  Time of Last Liquid: 2130 (pt had water and medication in [facility] this am)  Date of Last Solid: 06/16/24  Time of Last Solid: 1930         Physical Exam    Airway  Mallampati: II     Cardiovascular - normal exam  Rhythm: regular     Dental - normal exam     Pulmonary - normal exam     Abdominal - normal exam             Anesthesia Plan    History of general anesthesia?: yes  History of complications of general anesthesia?: no    ASA 2     MAC and spinal     The patient is not a current smoker.    intravenous induction   Anesthetic plan and risks discussed with patient.

## 2024-06-17 NOTE — PROGRESS NOTES
06/17/24 1655   Discharge Planning   Living Arrangements Alone   Support Systems Family members  (pt sister to stay x 1 week)   Assistance Needed none, Pt states Independent ADLS and IADLS no AD, drives, works, denies falls. Pt owns FWWW, cane, tub shower and raised toilet. Pt request tub transfer bench.   Type of Residence Private residence  (1 level house with 5 STEFANY with HR. Bed/bath on 1st floor with tub shower without a seat or bars. Laundry in basement. 13 steps with HR to basement.)   Number of Stairs to Enter Residence 5  (with handrail)   Number of Stairs Within Residence 13   Do you have animals or pets at home? Yes   Type of Animals or Pets 1 Dog   Home or Post Acute Services In home services   Type of Home Care Services Home OT;Home PT   Patient expects to be discharged to: Home with [facility]   Does the patient need discharge transport arranged? No   Financial Resource Strain   How hard is it for you to pay for the very basics like food, housing, medical care, and heating? Not hard   Housing Stability   In the last 12 months, was there a time when you were not able to pay the mortgage or rent on time? N   In the last 12 months, how many places have you lived? 1   In the last 12 months, was there a time when you did not have a steady place to sleep or slept in a shelter (including now)? N   Transportation Needs   In the past 12 months, has lack of transportation kept you from medical appointments or from getting medications? no   In the past 12 months, has lack of transportation kept you from meetings, work, or from getting things needed for daily living? No   Patient Choice   Provider Choice list and CMS website (https://medicare.gov/care-compare#search) for post-acute Quality and Resource Measure Data were provided and reviewed with: Patient   Patient / Family choosing to utilize agency / facility established prior to hospitalization No     Pt is s/p Elective Left total hip arthroplasty 6/17/24 with   Jake West. Pt is weight bearing as tolerated with posterior hip precautions. AMPAC scores are PT (11) OT (pending) and recommending continued therapy at low level/intensity. Pt in agreement, prefers home with [facility] and agency of choice is [facility]. Bonnie sousa, best phone # [phone]. Pt lives alone, his sister staying x 1 week to assist as needed. CNP notified of pt [facility] agency of choice.

## 2024-06-17 NOTE — ANESTHESIA PROCEDURE NOTES
Spinal Block    Patient location during procedure: OR  Start time: 6/17/2024 10:08 AM  End time: 6/17/2024 10:12 AM  Reason for block: primary anesthetic and at surgeon's request  Staffing  Performed: attending   Authorized by: Doroteo An MD    Performed by: Doroteo An MD    Preanesthetic Checklist  Completed: patient identified, IV checked, risks and benefits discussed, surgical consent, pre-op evaluation, timeout performed and sterile techniques followed  Block Timeout  RN/Licensed healthcare professional reads aloud to the Anesthesia provider and entire team: Patient identity, procedure with side and site, patient position, and as applicable the availability of implants/special equipment/special requirements.  Patient on coagulant treatment: no  Timeout performed at: 6/17/2024 10:08 AM  Spinal Block  Patient position: sitting  Prep: Betadine  Sterility prep: cap, drape, gloves, gown, hand hygiene and mask  Sedation level: light sedation  Patient monitoring: blood pressure, continuous pulse oximetry and EKG  Approach: midline  Vertebral space: L3-4  Injection technique: single-shot  Needle  Needle gauge: 24 G  Needle length: 3.5 in  Free flowing CSF: yes    Assessment  Sensory level: T6  Procedure assessment: patient sedated but conversant throughout procedure and patient tolerated procedure well with no immediate complications

## 2024-06-17 NOTE — INTERVAL H&P NOTE
History and physical is reviewed, patient re evaluated, no changes.  Procedure, risks, benefits, and postoperative course were discussed with the patient and consent is reviewed.    Jake West MD

## 2024-06-17 NOTE — PROGRESS NOTES
Physical Therapy    Physical Therapy Evaluation & Treatment    Patient Name: Luis Alberto Lal  MRN: [id number]  Today's Date: 6/17/2024   Time Calculation  Start Time: 1417  Stop Time: 1447  Time Calculation (min): 30 min  604/604-A    Assessment/Plan   PT Assessment  PT Assessment Results: Decreased strength, Decreased endurance, Impaired balance, Decreased mobility  Rehab Prognosis: Good  Evaluation/Treatment Tolerance: Patient tolerated treatment well  Medical Staff Made Aware: Yes  Strengths: Ability to acquire knowledge, Attitude of self, Housing layout, Living arrangement secure, Premorbid level of function, Support of Caregivers  End of Session Communication: Bedside nurse, PCT/NA/CTA  End of Session Patient Position: Alarm on, Bed, 3 rail up (Call light within reach)  IP OR SWING BED PT PLAN  Inpatient or Swing Bed: Inpatient  PT Plan  Treatment/Interventions: Bed mobility, Transfer training, Gait training, Stair training, Strengthening, Therapeutic exercise, Home exercise program  PT Plan: Ongoing PT  PT Frequency: BID  PT Discharge Recommendations: Low intensity level of continued care  PT Recommended Transfer Status: Assistive device, Assist x2 (sit/stand only on eval)  Physical Therapy eval completed per MD requisition. P.T. recommendations as outlined above. Recommend D/C from acute care when medically appropriate as deemed by medical staff.      Subjective     General Visit Information:  General  Reason for Referral: L VINCENT  Referred By: Dr. West (PT/OT 6/17)  Past Medical History Relevant to Rehab: includes: HTN, OA, liver cyst, cervical fusion, R VINCENT, lumbar microdiscectomy  Family/Caregiver Present: Yes  Caregiver Feedback: Sister supportive  Prior to Session Communication: Bedside nurse  Patient Position Received: Alarm on, Bed, 3 rail up  Preferred Learning Style: auditory, verbal, written  General Comment: Pt. is a 62yo who presented to [facility] on 6/17/2024 for a scheduled L VINCENT by   Constantine    Home Living:  Home Living  Home Living Comments: Pt. lives alone in a 1 level house with 5 STEFANY with HR. Bed/bath on 1st floor with tub shower without a seat or bars. Laundry in basement. 13 steps with HR to basement. Pt.'s sister will stay with Pt. x 1 week after D/C.    Prior Level of Function:  Prior Function Per Pt/Caregiver Report  Prior Function Comments: Pt. amb without AD PTA but owns a cane and a FWW.  Pt. was I with ADLs and IADLs PTA. Pt. denied falls in last 3 months. Pt. drove PTA.    Precautions:  Precautions  LE Weight Bearing Status: Weight Bearing as Tolerated  Post-Surgical Precautions: Left hip precautions  Precautions Comment: Posterior VINCENT precautions (no bending hip > 90 degrees, no twisting, no bending)         Objective     Pain:  Pain Assessment  Pain Assessment: 0-10  Pain Score: 0 - No pain  Pain Interventions: Cold applied, Repositioned    Cognition:  Cognition  Overall Cognitive Status: Within Functional Limits    General Assessments:  General Observation  General Observation: IV, Purewick   Activity Tolerance  Endurance: Tolerates 10 - 20 min exercise with multiple rests  Sensation  Sensation Comment: Pt. reported B feet feeling numb when sitting on EOB              Dynamic Sitting Balance  Dynamic Sitting-Comments: Good static and dynamic sitting balance  Dynamic Standing Balance  Dynamic Standing-Comments: Poor static standing balance    Functional Assessments:     Bed Mobility  Bed Mobility: Yes  Bed Mobility 1  Bed Mobility 1: Supine to sitting  Level of Assistance 1: Minimum assistance  Bed Mobility Comments 1: A to maneuver L LE over EOB. VC's to use B UEs to elevate trunk and scoot hips to EOB.  Bed Mobility 2  Bed Mobility  2: Sitting to supine  Level of Assistance 2: Minimum assistance  Bed Mobility Comments 2: A to lift L LE onto bed  Transfers  Transfer: Yes  Transfer 1  Technique 1: Sit to stand  Transfer Device 1: Gait belt (FWW)  Transfer Level of Assistance 1:  Maximum assistance, +2  Trials/Comments 1: A for lifting, transferring weight forward over feet, steadying. VC's for hand placement. Pt. demonstrated decreased motor control L LE in stance and L LE buckled.  Transfers 2  Technique 2: Stand to sit  Transfer Device 2:  (FWW)  Transfer Level of Assistance 2: Maximum assistance, +2  Trials/Comments 2: A to control descent. VC's for hand placement. FWW used.  Ambulation/Gait Training  Ambulation/Gait Training Performed: No (Unable to take any steps due to decreased motor control L LE.)  Stairs  Stairs: No       Extremity/Trunk Assessments:        RLE   RLE : Within Functional Limits  LLE   LLE : Exceptions to WFL  AROM LLE (degrees)  LLE AROM Comment: Knee and ankle WFL, Hip assessed within hip precautions  Strength LLE  LLE Overall Strength:  (Hip flex 3-/5, knee ext 3+/5, ankle DF/PF 4/5)    Treatments:  Therapeutic Exercise  Therapeutic Exercise Performed: Yes  Therapeutic Exercise Activity 1: Supine B LE ther ex 1 x 15: QS, AP, GS, Hip ABD, HS, SAQ                  Outcome Measures:  [facility] Basic Mobility  Turning from your back to your side while in a flat bed without using bedrails: A little  Moving from lying on your back to sitting on the side of a flat bed without using bedrails: A little  Moving to and from bed to chair (including a wheelchair): Total  Standing up from a chair using your arms (e.g. wheelchair or bedside chair): A lot  To walk in hospital room: Total  Climbing 3-5 steps with railing: Total  Basic Mobility - Total Score: 11                            Goals:  Encounter Problems       Encounter Problems (Active)       PT Problem       Pt. will transfer supine/sit with MOD I (Progressing)       Start:  06/17/24    Expected End:  06/20/24            Pt. will transfer sit/stand with FWW with MOD I (Progressing)       Start:  06/17/24    Expected End:  06/20/24            Pt.will ambulate 125' with FWW with MOD I (Not Progressing)       Start:  06/17/24     Expected End:  06/20/24            Pt. will amb up/down 5 steps with HR and cane with CGA  (Not Progressing)       Start:  06/17/24    Expected End:  06/20/24            Pt. will perform 2 x 15 B LE AROM exercises  (Progressing)       Start:  06/17/24    Expected End:  06/20/24                          Education Documentation  Precautions, taught by Nadir Alatorre PT at 6/17/2024  3:52 PM.  Learner: Family, Patient  Readiness: Acceptance  Method: Explanation  Response: Verbalizes Understanding  Comment: Role of PT, transfers, amb, safety, PT POC, ther ex, posterior VINCENT precautions: no bending hip > 90 degrees, no twisting, no crossing LEs    Home Exercise Program, taught by Nadir Alatorre PT at 6/17/2024  3:52 PM.  Learner: Family, Patient  Readiness: Acceptance  Method: Explanation  Response: Verbalizes Understanding  Comment: Role of PT, transfers, amb, safety, PT POC, ther ex, posterior VINCENT precautions: no bending hip > 90 degrees, no twisting, no crossing LEs    Mobility Training, taught by Nadir Alatorre PT at 6/17/2024  3:52 PM.  Learner: Family, Patient  Readiness: Acceptance  Method: Explanation  Response: Verbalizes Understanding  Comment: Role of PT, transfers, amb, safety, PT POC, ther ex, posterior VINCENT precautions: no bending hip > 90 degrees, no twisting, no crossing LEs

## 2024-06-17 NOTE — DISCHARGE INSTRUCTIONS
Total Hip Replacement   Discharge Instructions    To prevent Clot formation, you have been placed on the following medication:  ASA 81 mg twice a day for 30 days started on 6/17/24    Surgical Site Care:  Change dressing once a day and PRN (as needed). Apply 4 x 4 sponge and light tape. If glue present, leave open to air.  You may leave wound open to air after initial dressing removal, if wound is clean, dry and intact  If Aquacel Ag dressing is present, do not remove dressing for 7 days, unless heavily saturated. If heavily saturated, remove dressing and start using instructions above  Staples will be removed on post-operative day 14 and steri-strips applied  Showering is permitted starting POD1 if waterproof Aquacel dressing is present or when the incision is covered with 4 x 4 and Tegaderm waterproof dressing  Until all areas of incision are healed.    Physical Therapy:  Weight Bearing Status:  Weight-bearing as tolerated  No Hip precautions, per therapy handout     Pain Medications  You were given  oxycodone  Wean off pain medications as you deem appropriate as long as pain is under control  Do not exceed 4,000 mg of acetaminophen from all sources in a 24 hour period    Cold packs/Ice packs/Machine  May be used 5 times daily for 15-30 minutes as necessary  Be sure to have a barrier (cloth, clothing, towel) between the site and the ice pack to prevent frostbite    Contact [facility] office if  Increased redness, swelling, drainage of any kind, and/or pain to surgery site.  As well as new onset fevers and or chills.  These could signify an infection.  Calf or thigh tenderness to touch as well as increased swelling or redness.  This could signify a clot formation.  Numbness or tingling to an area around the incision site or below the incision site (toes).  Any rash appears, increased  or new onset nausea/vomiting occur.  This may indicate a reaction to a medication.  Phone # [phone].  Follow up  with Surgeon in 2 weeks  I acknowledge that I have received blas hose and understand the instructions on how and when to wear them (on during the day, off at night)   Discharging RN who has gone over instructions and acknowledges blas hose have been received     Ice 5 times a day for 20 minutes each session to operative extremity for two weeks.   BLAS hose to be worn for three weeks. Can be removed for skin care and hygiene.   Incentive spirometer 10 times every hour while awake for two weeks.

## 2024-06-17 NOTE — OP NOTE
Total Hip Arthroplasty (L) Operative Note     Date: 2024  OR Location: [facility]    Name: Luis Alberto Lal, : 1960, Age: 63 y.o., MRN: [id number], Sex: male    Diagnosis  Pre-op Diagnosis     * Unilateral primary osteoarthritis, left hip [M16.12] Post-op Diagnosis     * Unilateral primary osteoarthritis, left hip [M16.12]     Procedures  Total Hip Arthroplasty  82450 - ND ARTHRP ACETBLR/PROX FEM PROSTC AGRFT/ALGRFT      Surgeons      * Jake West - Primary    Resident/Fellow/Other Assistant:  Surgeons and Role:     * Bahman Hansen PA-C - Assisting    Procedure Summary  Anesthesia: Spinal  ASA: II  Anesthesia Staff: Anesthesiologist: Doroteo An MD  Estimated Blood Loss: 100mL  Intra-op Medications:   Administrations occurring from 0930 to 1030 on 24:   Medication Name Total Dose   sodium chloride 0.9 % irrigation solution 1,000 mL   ropivacaine-epinephrine-clonidine-ketorolac 2.46-0.005- 0.0008-0.3mg/mL periarticular syringe 100 mL   ceFAZolin in dextrose (iso-os) (Ancef) IVPB 2 g 2 g              Anesthesia Record               Intraprocedure I/O Totals          Intake    Propofol Drip 0.00 mL    The total shown is the total volume documented since Anesthesia Start was filed.    lactated Ringer's infusion 1000.00 mL    Total Intake 1000 mL       Output    Est. Blood Loss 100 mL    Total Output 100 mL       Net    Net Volume 900 mL          Specimen: No specimens collected     Staff:   Circulator: Maikel  Scrub Person: Farrah         Drains and/or Catheters: * None in log *    Tourniquet Times:         Implants:  Implants       Type Name Action Serial No.      Joint SHELL, TRIDENT II, CLUSTERHOLE, HA 52E - [id number] Implanted      Joint INSERT, TRIDENT X3 POLYETHYLENE, 10 DEG, 36MM E - [id number] Implanted      Joint STEM, FEMUR 127D SZ 5 - [id number] Implanted      Joint HEAD, FEMUR V40 36MM -5MM BIOLOX DELTA - [id number] Implanted               Findings: see procedure details    Indications:  Luis Alberto Lal is an 63 y.o. male who is having surgery for Unilateral primary osteoarthritis, left hip [M16.12].     The patient was seen in the preoperative area. The risks, benefits, complications, treatment options, non-operative alternatives, expected recovery and outcomes were discussed with the patient. The possibilities of reaction to medication, pulmonary aspiration, injury to surrounding structures, bleeding, recurrent infection, the need for additional procedures, failure to diagnose a condition, and creating a complication requiring transfusion or operation were discussed with the patient. The patient concurred with the proposed plan, giving informed consent.  The site of surgery was properly noted/marked if necessary per policy. The patient has been actively warmed in preoperative area. Preoperative antibiotics have been ordered and given within 1 hours of incision. Venous thrombosis prophylaxis have been ordered.    Procedure Details:   Preoperative diagnosis DJD hip  Postoperative diagnosis same  Procedure total hip replacement    Primary surgeon Jake Lawrence. Bahman ESCALONA certified    Implants  Cup52 Trident 2  Liner 10 degree elevated lip  Stem Accolade 2 uncemented size #5 neck angle 127  Head Biolox delta ceramic 36 mm neck length -5      Patient is suffering from chronic hip pain that has been refractory to conservative treatment and now presents for total hip replacement.  The risks benefits outcomes and postoperative course were fully explained to the patient.  We discussed where loosening infection blood clots loss of life loss of limb nerve damage numbness failure of the procedure progressive arthritis and the potential need for revision surgery.  The patient understands these risks and consents to the surgical intervention.    The patient has pain in the hip that is increased with activity and weightbearing, and walks with an antalgic gait.  The pain is interfering with  activities of daily living.  Patient has limited range of motion and pain with passive range of motion.  X-rays demonstrate joint space narrowing, subchondral sclerosis and osteophyte formation.  Symptoms are not improving with medication, physical therapy or supportive device for a period of at least 3 months.    The physician assistant was present through the entire case.  Given the nature of the disease process and the procedure to be performed skilled surgical first assistant was necessary during the case.  The assistant was necessary to hold retractors and manipulate the extremity during the procedure.  A certified scrub tech was at the back table managing the instruments and supplies for the surgical case.      Patient was brought to the operating room and placed on the surgical table in the supine position where adequate anesthesia was then obtained.  A surgical timeout was then performed.  The patient was positioned in the lateral decubitus position with the affected hip up being careful to pad all pressure points.  The patient was then prepped and draped in usual sterile manner.  A standard posterolateral approach to the hip was made and electrocauterization was used for hemostasis.  The IT band was split in line with its fibers and anterior and posterior retractors were then inserted.  The hip was maximally internally rotated and the short external rotators and piriformis tendon were taken down in 1 layer along with the joint capsule using the Bovie electrocautery device.  The capsule was teed in line with the piriformis tendon.  The hip was then dislocated and the femoral neck resection was made 1 cm above the lesser trochanter.  The femoral head showed signs of severe arthritic changes with joint calcification and loss of articular cartilage.    Anterior and posterior acetabular retractors were then inserted and the acetabular labrum was sharply excised using a knife.  Next acetabulum was curetted to  remove any remaining soft tissues and sequential reaming was performed.  Once final reaming was complete this had excellent circumferential fit and good bleeding bone porous acetabular shell was then inserted in approximately 45ï¿½ of abduction and 10-15ï¿½ of anteversion.  The acetabular shell was probed and stable and was completely seated.  The acetabular liner was then inserted and completely seated as well.  The liner was probed and stable.    At this point femoral preparation was begun.  A femoral neck retractor was inserted and anterior and posterior femoral retractors were inserted as well.  A box osteotome was used to start the femoral canal and a Charnley awl was inserted down the canal.  Sequential broaching was then performed being careful to keep the broaches in 10-15ï¿½ of anteversion.  With the final broach in position and completely seated calcar planing was performed.  A trial reduction was then performed and the hip was stable to an anatomic range of motion.  Trial components were then removed the final femoral stem was inserted and completely seated.  Femoral head and liner were applied to reduced.  Range of motion was again assessed and felt to be satisfactory leg lengths were assessed as well.  The joint was then irrigated with a copious amount of pulsatile irrigation.    Closure was begun using #2 Ethibond sutures through the joint capsule short external rotators and piriformis tendon that were tied through drill holes in the piriformis fossa of the femur.  The IT band was closed using a running locked #1 Vicryl suture.  3-0 Monocryl suture was used for the underlying subcutaneous tissues and 4-0 Monocryl suture was used for subcuticular stitch.  Skin glue was used for the skin and a dry sterile Aquacell dressing was used for bandage.    The patient tolerated the procedure well and was taken to the postanesthesia care unit in satisfactory condition.  Postoperative x-rays were reviewed and  surgical findings were discussed with the patient's family at the conclusion of the procedure.    This note was prepared using voice recognition software.  The details of this note are correct and have been reviewed, and corrected to the best of my ability.  Some grammatical areas may persist related to the Dragon software    Jake West MD    [phone]    Complications:  None; patient tolerated the procedure well.    Disposition: PACU - hemodynamically stable.  Condition: stable         Jake West  Phone Number: [phone]

## 2024-06-18 ENCOUNTER — HOME HEALTH ADMISSION (OUTPATIENT)
Dept: HOME HEALTH SERVICES | Facility: HOME HEALTH | Age: 64
End: 2024-06-18
Payer: COMMERCIAL

## 2024-06-18 ENCOUNTER — PHARMACY VISIT (OUTPATIENT)
Dept: PHARMACY | Facility: CLINIC | Age: 64
End: 2024-06-18
Payer: MEDICARE

## 2024-06-18 ENCOUNTER — DOCUMENTATION (OUTPATIENT)
Dept: HOME HEALTH SERVICES | Facility: HOME HEALTH | Age: 64
End: 2024-06-18
Payer: COMMERCIAL

## 2024-06-18 VITALS
SYSTOLIC BLOOD PRESSURE: 120 MMHG | HEIGHT: 66 IN | WEIGHT: 169.75 LBS | HEART RATE: 77 BPM | DIASTOLIC BLOOD PRESSURE: 66 MMHG | OXYGEN SATURATION: 94 % | BODY MASS INDEX: 27.28 KG/M2 | TEMPERATURE: 99.1 F | RESPIRATION RATE: 16 BRPM

## 2024-06-18 PROBLEM — M16.12 UNILATERAL PRIMARY OSTEOARTHRITIS, LEFT HIP: Status: RESOLVED | Noted: 2024-05-21 | Resolved: 2024-06-18

## 2024-06-18 LAB
ANION GAP SERPL CALC-SCNC: 10 MMOL/L (ref 10–20)
BUN SERPL-MCNC: 13 MG/DL (ref 6–23)
CALCIUM SERPL-MCNC: 8.3 MG/DL (ref 8.6–10.3)
CHLORIDE SERPL-SCNC: 102 MMOL/L (ref 98–107)
CO2 SERPL-SCNC: 27 MMOL/L (ref 21–32)
CREAT SERPL-MCNC: 0.68 MG/DL (ref 0.5–1.3)
EGFRCR SERPLBLD CKD-EPI 2021: >90 ML/MIN/1.73M*2
ERYTHROCYTE [DISTWIDTH] IN BLOOD BY AUTOMATED COUNT: 12.8 % (ref 11.5–14.5)
GLUCOSE SERPL-MCNC: 107 MG/DL (ref 74–99)
HCT VFR BLD AUTO: 41.3 % (ref 41–52)
HGB BLD-MCNC: 13.6 G/DL (ref 13.5–17.5)
MCH RBC QN AUTO: 28.8 PG (ref 26–34)
MCHC RBC AUTO-ENTMCNC: 32.9 G/DL (ref 32–36)
MCV RBC AUTO: 87 FL (ref 80–100)
NRBC BLD-RTO: 0 /100 WBCS (ref 0–0)
PLATELET # BLD AUTO: 209 X10*3/UL (ref 150–450)
POTASSIUM SERPL-SCNC: 4.2 MMOL/L (ref 3.5–5.3)
RBC # BLD AUTO: 4.73 X10*6/UL (ref 4.5–5.9)
SODIUM SERPL-SCNC: 135 MMOL/L (ref 136–145)
WBC # BLD AUTO: 10.8 X10*3/UL (ref 4.4–11.3)

## 2024-06-18 PROCEDURE — 2500000004 HC RX 250 GENERAL PHARMACY W/ HCPCS (ALT 636 FOR OP/ED): Performed by: ORTHOPAEDIC SURGERY

## 2024-06-18 PROCEDURE — 97530 THERAPEUTIC ACTIVITIES: CPT | Mod: GP,CQ

## 2024-06-18 PROCEDURE — 80048 BASIC METABOLIC PNL TOTAL CA: CPT | Performed by: ORTHOPAEDIC SURGERY

## 2024-06-18 PROCEDURE — 36415 COLL VENOUS BLD VENIPUNCTURE: CPT | Performed by: ORTHOPAEDIC SURGERY

## 2024-06-18 PROCEDURE — 7100000011 HC EXTENDED STAY RECOVERY HOURLY - NURSING UNIT

## 2024-06-18 PROCEDURE — 97116 GAIT TRAINING THERAPY: CPT | Mod: GP,CQ

## 2024-06-18 PROCEDURE — 2500000002 HC RX 250 W HCPCS SELF ADMINISTERED DRUGS (ALT 637 FOR MEDICARE OP, ALT 636 FOR OP/ED): Performed by: ORTHOPAEDIC SURGERY

## 2024-06-18 PROCEDURE — 97165 OT EVAL LOW COMPLEX 30 MIN: CPT | Mod: GO

## 2024-06-18 PROCEDURE — 2500000001 HC RX 250 WO HCPCS SELF ADMINISTERED DRUGS (ALT 637 FOR MEDICARE OP): Performed by: ORTHOPAEDIC SURGERY

## 2024-06-18 PROCEDURE — RXMED WILLOW AMBULATORY MEDICATION CHARGE

## 2024-06-18 PROCEDURE — 85027 COMPLETE CBC AUTOMATED: CPT | Performed by: ORTHOPAEDIC SURGERY

## 2024-06-18 PROCEDURE — 97110 THERAPEUTIC EXERCISES: CPT | Mod: GP,CQ

## 2024-06-18 RX ORDER — OXYCODONE HYDROCHLORIDE 5 MG/1
5 TABLET ORAL EVERY 6 HOURS PRN
Qty: 28 TABLET | Refills: 0 | Status: SHIPPED | OUTPATIENT
Start: 2024-06-18 | End: 2024-06-25

## 2024-06-18 RX ORDER — CYCLOBENZAPRINE HCL 10 MG
10 TABLET ORAL 3 TIMES DAILY PRN
Qty: 21 TABLET | Refills: 0 | Status: SHIPPED | OUTPATIENT
Start: 2024-06-18 | End: 2024-06-25

## 2024-06-18 RX ORDER — ASPIRIN 81 MG/1
81 TABLET ORAL 2 TIMES DAILY
Qty: 60 TABLET | Refills: 0 | Status: SHIPPED | OUTPATIENT
Start: 2024-06-18 | End: 2024-07-18

## 2024-06-18 RX ORDER — DOCUSATE SODIUM 100 MG/1
100 CAPSULE, LIQUID FILLED ORAL 2 TIMES DAILY
Qty: 20 CAPSULE | Refills: 0 | Status: SHIPPED | OUTPATIENT
Start: 2024-06-18 | End: 2024-06-28

## 2024-06-18 RX ORDER — ACETAMINOPHEN 325 MG/1
650 TABLET ORAL EVERY 6 HOURS SCHEDULED
Qty: 56 TABLET | Refills: 0 | Status: SHIPPED | OUTPATIENT
Start: 2024-06-18 | End: 2024-06-25

## 2024-06-18 ASSESSMENT — PAIN - FUNCTIONAL ASSESSMENT
PAIN_FUNCTIONAL_ASSESSMENT: 0-10

## 2024-06-18 ASSESSMENT — COGNITIVE AND FUNCTIONAL STATUS - GENERAL
WALKING IN HOSPITAL ROOM: A LITTLE
HELP NEEDED FOR BATHING: A LITTLE
DRESSING REGULAR UPPER BODY CLOTHING: A LITTLE
TURNING FROM BACK TO SIDE WHILE IN FLAT BAD: A LITTLE
DAILY ACTIVITIY SCORE: 19
MOBILITY SCORE: 18
TOILETING: A LITTLE
MOVING FROM LYING ON BACK TO SITTING ON SIDE OF FLAT BED WITH BEDRAILS: A LITTLE
CLIMB 3 TO 5 STEPS WITH RAILING: A LITTLE
PERSONAL GROOMING: A LITTLE
MOVING TO AND FROM BED TO CHAIR: A LITTLE
STANDING UP FROM CHAIR USING ARMS: A LITTLE
DRESSING REGULAR LOWER BODY CLOTHING: A LITTLE

## 2024-06-18 ASSESSMENT — PAIN SCALES - GENERAL
PAINLEVEL_OUTOF10: 7
PAINLEVEL_OUTOF10: 3

## 2024-06-18 ASSESSMENT — PAIN DESCRIPTION - DESCRIPTORS: DESCRIPTORS: ACHING

## 2024-06-18 ASSESSMENT — ACTIVITIES OF DAILY LIVING (ADL): BATHING_ASSISTANCE: MINIMAL

## 2024-06-18 NOTE — HH CARE COORDINATION
Home Care received a Referral for Physical Therapy and Occupational Therapy. We have processed the referral for a Start of Care on 06/19/2024.     If you have any questions or concerns, please feel free to contact us at [phone]. Follow the prompts, enter your five digit zip code, and you will be directed to your care team on WEST 1.

## 2024-06-18 NOTE — PROGRESS NOTES
Hip surgery    Progress Note    Subjective:     Post-Operative Day # 1   Status Post left Hip Arthroplasty    Systemic or Specific Complaints: No Complaints    Objective:     Visit Vitals  /66 (Patient Position: Sitting)   Pulse 77   Temp 37.3 °C (99.1 °F) (Temporal)   Resp 16       General: alert and oriented, in no acute distress, appears stated age, and cooperative   Wound: no erythema, no edema, no drainage, and dressing is clean, dry, and intact   Motion: Painful range of Motion   DVT Exam: No evidence of DVT seen on physical exam.  Negative Adeel's sign.  No significant calf/ankle edema.       NVI in lower extremity. left thigh soft to palpation. Strong equal dorsi/plantar flexion bilaterally. Good distal pulses bilaterally.      Data Review  Recent Results (from the past 24 hour(s))   CBC    Collection Time: 06/18/24  5:34 AM   Result Value Ref Range    WBC 10.8 4.4 - 11.3 x10*3/uL    nRBC 0.0 0.0 - 0.0 /100 WBCs    RBC 4.73 4.50 - 5.90 x10*6/uL    Hemoglobin 13.6 13.5 - 17.5 g/dL    Hematocrit 41.3 41.0 - 52.0 %    MCV 87 80 - 100 fL    MCH 28.8 26.0 - 34.0 pg    MCHC 32.9 32.0 - 36.0 g/dL    RDW 12.8 11.5 - 14.5 %    Platelets 209 150 - 450 x10*3/uL   Basic metabolic panel    Collection Time: 06/18/24  5:34 AM   Result Value Ref Range    Glucose 107 (H) 74 - 99 mg/dL    Sodium 135 (L) 136 - 145 mmol/L    Potassium 4.2 3.5 - 5.3 mmol/L    Chloride 102 98 - 107 mmol/L    Bicarbonate 27 21 - 32 mmol/L    Anion Gap 10 10 - 20 mmol/L    Urea Nitrogen 13 6 - 23 mg/dL    Creatinine 0.68 0.50 - 1.30 mg/dL    eGFR >90 >60 mL/min/1.73m*2    Calcium 8.3 (L) 8.6 - 10.3 mg/dL     XR hip left with pelvis when performed 1 view    Result Date: 6/17/2024  Interpreted By:  Schoenberger, Joseph, STUDY: XR HIP LEFT WITH PELVIS WHEN PERFORMED 1 VIEW; ;  6/17/2024 11:30 am   INDICATION: Signs/Symptoms:Post op hip.   COMPARISON: 03/06/2024   ACCESSION NUMBER(S): [id number]   ORDERING CLINICIAN: SEB CLARK    FINDINGS: In the interval since the prior exam the patient has undergone left total hip arthroplasty. Apparent satisfactory positioning of prosthetic components. Soft tissue air in keeping recent operative status.       Expected postoperative findings.     MACRO: None   Signed by: Joseph Schoenberger 6/17/2024 2:24 PM Dictation workstation:   [id number]    CT hip left wo IV contrast    Result Date: 6/5/2024  Interpreted By:  Maria Ines Garcia, STUDY: CT HIP LEFT WO IV CONTRAST; ;  6/5/2024 9:50 am   INDICATION: Signs/Symptoms:pain.   COMPARISON: Pelvis and hip radiographs 03/06/2024.   ACCESSION NUMBER(S): [id number]   ORDERING CLINICIAN: SEB CLARK   TECHNIQUE: Axial CT images of the hips and knees were acquired in bone window. These images were performed per pre-surgical planning protocol.   FINDINGS: BONES AND JOINTS: No acute osseous changes.   There are postsurgical changes of right hip hemiarthroplasty. There is no perihardware lucency to suggest hardware complication.   Severe severe joint space narrowing of the left hip is visualized with subchondral sclerosis as well as subchondral cystic changes and osteophyte formation.   Moderate joint space narrowing of the pubic symphysis and bilateral sacroiliac joints is also visualized.   There are partially visualized multilevel degenerative changes of the lower lumbar spine with disc space narrowing, endplate osteophytosis, and multilevel facet joint hypertrophy.   There is mild patellofemoral joint space narrowing of the knees bilaterally. No knee joint effusions.   SOFT TISSUES: Please note that the study is limited in the evaluation of soft tissues due to lack of IV contrast. Evaluation of the deep pelvis is also markedly limited due to streak artifact from the patient's arthroplasty device which is detailed above   Within these limitations note is made of colonic diverticulosis without visualized evidence of diverticulitis.       Pre-surgical  planning CT without evidence of acute osseous changes nor incidental soft tissue findings.   Severe left hip osteoarthrosis is visualized.   There are postsurgical changes of right hip hemiarthroplasty without evidence of hardware complication.   MACRO: None   Signed by: Maria Ines Garcia 6/5/2024 1:45 PM Dictation workstation:   [id number]    ECG 12 Lead    Result Date: 5/20/2024  Sinus bradycardia with no signs of infarct or ischemia.      Assessment:     Status Post left Hip Arthroplasty. Doing well postoperatively. No acute events overnight.     Acute postoperative pain: Reports mild to moderate pain to operative extremity. Exacerbated by movement, relieved by rest ice and pain medication.  Continue current pain management.     Patient will need a tub transfer bench due to recent hip surgery and precautions with the inability to get into bathtub/shower without it.  Plan:      1.  Discharge today, Return to Clinic: in 2 weeks    2.  Continue Deep venous thrombosis prophylaxis: Aspirin 81 mg BID for 30 days  3.  Continue physical therapy: WBAT with posterior hip precautions to operative extremity  4.  Continue Pain Control: scripts sent  5.  Discharge planning: patient plans to return to home with  home care at discharge, orders placed. SW and TCC following for discharge planning.       RENATE Franco   6/18/2024 8:46 AM

## 2024-06-18 NOTE — PROGRESS NOTES
Occupational Therapy    Evaluation    Patient Name: Luis Alberto Lal  MRN: [id number]  Today's Date: 6/18/2024  Time Calculation  Start Time: 0857  Stop Time: 0923  Time Calculation (min): 26 min        Assessment:  OT Assessment: Pt demonstrates an ADL impairment including LB dressing deficits and deficits with functional transfers following L total hip replacement.  Prognosis: Good  Barriers to Discharge: None (Pt sister to stay with pt x1 week following discharge.)  Evaluation/Treatment Tolerance: Patient tolerated treatment well  Medical Staff Made Aware: Yes  End of Session Communication: Bedside nurse  End of Session Patient Position: Up in chair, Alarm on (call light in reach)  OT Assessment Results: Decreased ADL status, Decreased functional mobility  Prognosis: Good  Barriers to Discharge: None (Pt sister to stay with pt x1 week following discharge.)  Evaluation/Treatment Tolerance: Patient tolerated treatment well  Medical Staff Made Aware: Yes  Strengths: Ability to acquire knowledge, Access to adaptive/assistive products  Barriers to Participation: Support of Caregivers  Plan:  Treatment Interventions: ADL retraining, Functional transfer training  OT Frequency: 2 times per week  OT Discharge Recommendations: Low intensity level of continued care  Equipment Recommended upon Discharge:  (tub bench)  OT Recommended Transfer Status:  (CGA with FWW)  OT - OK to Discharge: Yes (Pt ok to d/c to next level of care pending medical clearance by team.)  Treatment Interventions: ADL retraining, Functional transfer training    Subjective   Current Problem:  1. Unilateral primary osteoarthritis, left hip        2. Status post left hip replacement  Referral to Home Health    acetaminophen (Tylenol) 325 mg tablet    aspirin 81 mg EC tablet    cyclobenzaprine (Flexeril) 10 mg tablet    docusate sodium (Colace) 100 mg capsule    oxyCODONE (Roxicodone) 5 mg immediate release tablet        General:  General  Reason for Referral:  ADL impairment s/p L VINCENT by Dr. West on 6/17  Referred By: PT/OT Dr. West 6/17  Past Medical History Relevant to Rehab: PMHx: HTN, OA, liver cyst, cervical fusion, R VINCENT, lumbar microdiscectomy, benign prostatic hyperplasia  Family/Caregiver Present: No  Prior to Session Communication: Bedside nurse (cleared for OT evaluation)  Patient Position Received: Up in chair, Alarm on  General Comment: Pt is a 62yo M who is s/p L VINCENT by Dr. West on 6/17/2024.  Precautions:  LE Weight Bearing Status: Weight Bearing as Tolerated  Medical Precautions: Fall precautions  Post-Surgical Precautions: Left hip precautions  Precautions Comment: Posterior VINCENT precautions (no bending hip > 90 degrees, no twisting, no bending)  Vital Signs:     Pain:  Pain Assessment  Pain Assessment: 0-10  Pain Score: 3  Pain Type: Surgical pain  Pain Location: Hip  Pain Orientation: Left    Objective   Cognition:  Overall Cognitive Status: Within Functional Limits           Home Living:  Home Living Comments: Pt lives alone in 1 level home with basement. 5 STEFANY with handrail. Bed/bath on 1st floor with tub shower; no seat or grab bars. Laundry in basement; 13 steps down with ledge handrail. Pt's sister plans to stay with pt for 1 week after d/c. Has a dog.  Prior Function:  Prior Function Comments: Pt reports independent with I/ADLs prior to admission. Pt did not use AD PTA, owns cane and WW. Pt owns full hip kit including reacher, sock aid, leg , shoe horn.  - falls. + drives. 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       ADL:  Equipment: Reacher, Sock aid, Long-handled shoe horn  Eating Assistance: Independent  Eating Deficit: None  Grooming Assistance:  (CGA)  Grooming Deficit: Standing with assistive device, Increased time to complete, Supervision/safety  Bathing Assistance: Minimal  Bathing Deficit: Left lower leg including foot, Right lower leg including foot, Left upper leg, Right upper leg, Increased time to complete ,  Supervision/safety  UE Dressing Assistance: Stand by  UE Dressing Deficit: Increased time to complete, Supervision/safety, Verbal cueing  LE Dressing Assistance: Minimal  LE Dressing Deficit: Thread LLE into underwear, Thread RLE into underwear, Thread LLE into pants, Thread RLE into pants, Don/doff L sock, Don/doff R sock, Increased time to complete, Supervision/safety  Toileting Assistance with Device:  (CGA)  Toileting Deficit: Clothing management down, Clothing management up, Increased time to complete, Supervison/safety  Functional Assistance: Minimal  Functional Deficit: Toilet transfer, Increased time to complete, Supervision/safety, Verbal cueing  ADL Comments: Pt completes UB dressing at SBA while seated in chair. Pt completes LB dressing with reacher and sock aid with Bharati to manipulate clothing and adaptive equipment. Pt completes standing hygiene tasks at sink with CGA for safety and verbal cueing.  Activity Tolerance:  Endurance: Endurance does not limit participation in activity  Bed Mobility/Transfers: Bed Mobility  Bed Mobility: Yes (in chair upon OT arrival)    Transfers  Transfer: Yes  Transfer 1  Transfer From 1: Sit to, Stand to  Transfer to 1: Sit, Stand, Toilet, Chair with arms  Technique 1: Sit to stand, Stand to sit  Transfer Device 1: Walker  Transfer Level of Assistance 1: Contact guard, Minimal verbal cues  Trials/Comments 1: Pt completes sit <> stand from chair and toilet with CGA for safety and balance with minimal verbal cueing for hand placement and sequencing.      Functional Mobility:  Functional Mobility  Functional Mobility Performed:  (Pt completes functional mobility for community distances within room to bathroom at CGA for safety and balance.)  Sitting Balance:  Static Sitting Balance  Static Sitting-Balance Support: Feet supported, No upper extremity supported  Static Sitting-Level of Assistance: Distant supervision  Dynamic Sitting Balance  Dynamic Sitting-Balance Support:  Feet supported, No upper extremity supported  Dynamic Sitting-Balance: Reaching for objects  Dynamic Sitting-Comments: For dressing tasks while adhering to posterior hip precautions  Standing Balance:  Static Standing Balance  Static Standing-Balance Support: Bilateral upper extremity supported  Static Standing-Level of Assistance: Contact guard  Dynamic Standing Balance  Dynamic Standing-Balance Support: Bilateral upper extremity supported  Dynamic Standing-Balance: Forward lean, Turning  Dynamic Standing-Comments: For dressing and hygiene tasks while adhering to posterior hip precautions.     Strength:  Strength Comments: BUE strength grossly 5/5    Coordination:  Movements are Fluid and Coordinated: Yes   Hand Function:  Gross Grasp: Functional  Coordination: Functional  Extremities: RUE   RUE : Within Functional Limits and LUE   LUE: Within Functional Limits        Outcome Measures:[facility] Daily Activity  Putting on and taking off regular lower body clothing: A little  Bathing (including washing, rinsing, drying): A little  Putting on and taking off regular upper body clothing: A little  Toileting, which includes using toilet, bedpan or urinal: A little  Taking care of personal grooming such as brushing teeth: A little  Eating Meals: None  Daily Activity - Total Score: 19        Education Documentation  Body Mechanics, taught by Adelaida Escalante OT at 6/18/2024 11:51 AM.  Learner: Patient  Readiness: Acceptance  Method: Explanation  Response: Verbalizes Understanding, Needs Reinforcement    Precautions, taught by Adelaida Escalante OT at 6/18/2024 11:51 AM.  Learner: Patient  Readiness: Acceptance  Method: Explanation  Response: Verbalizes Understanding, Needs Reinforcement    ADL Training, taught by Adelaida Escalante OT at 6/18/2024 11:51 AM.  Learner: Patient  Readiness: Acceptance  Method: Explanation  Response: Verbalizes Understanding, Needs Reinforcement    Education Comments  No comments  found.          Goals:  Encounter Problems       Encounter Problems (Active)       OT Goals       Pt will complete LB dressing with Sobeida for use of AE.   (Progressing)       Start:  06/18/24    Expected End:  06/25/24            Patient will transfer to bed/chair/toilet with SBA with FWW. (Progressing)       Start:  06/18/24    Expected End:  06/25/24            Pt will ambulate functional household distance with Sobeida of use of FWW to complete I/ADL task.   (Progressing)       Start:  06/18/24    Expected End:  06/25/24

## 2024-06-18 NOTE — PROGRESS NOTES
06/18/24 1219   Discharge Planning   Home or Post Acute Services In home services   Type of Home Care Services Home PT;Home OT;DME or oxygen  (tub transfer bench)   Patient expects to be discharged to: Home with [facility]     Pt is s/p POD #1 Elective Left total hip arthroplasty 6/17/24 with Dr. Jake West. Pt is weight bearing as tolerated with posterior hip precautions. AMPAC scores are PT (11) OT (19) and recommending continued therapy at low level/intensity. Pt discharge preference remains home with [facility]. Bonnie sousa, best phone # [phone]. Pt lives alone, his sister staying x 1 week to assist as needed. Confirmed that medical services has delivered tub transfer bench to pt room. [facility] notified of [facility] referral/HCO in Epic and confirms received and processed for next day SOC.

## 2024-06-19 ENCOUNTER — HOME CARE VISIT (OUTPATIENT)
Dept: HOME HEALTH SERVICES | Facility: HOME HEALTH | Age: 64
End: 2024-06-19
Payer: COMMERCIAL

## 2024-06-19 VITALS — TEMPERATURE: 97.6 F

## 2024-06-19 PROCEDURE — G0151 HHCP-SERV OF PT,EA 15 MIN: HCPCS

## 2024-06-19 SDOH — HEALTH STABILITY: PHYSICAL HEALTH: PHYSICAL EXERCISE: SEATED

## 2024-06-19 SDOH — HEALTH STABILITY: PHYSICAL HEALTH: EXERCISE ACTIVITY: KNEE EXTENSION

## 2024-06-19 SDOH — HEALTH STABILITY: PHYSICAL HEALTH: EXERCISE TYPE: INSTRUCTED AND DEMONSTRATED SBA SEATED THER EX PROGRAM

## 2024-06-19 SDOH — HEALTH STABILITY: PHYSICAL HEALTH: EXERCISE ACTIVITY: HIP FLEXION

## 2024-06-19 SDOH — HEALTH STABILITY: PHYSICAL HEALTH: EXERCISE ACTIVITY: ANKLE PUMPS

## 2024-06-19 SDOH — HEALTH STABILITY: PHYSICAL HEALTH: EXERCISE ACTIVITY: HIP ABD/ADDUCTION

## 2024-06-19 SDOH — HEALTH STABILITY: PHYSICAL HEALTH: EXERCISE ACTIVITY: SIT TO STAND

## 2024-06-19 SDOH — HEALTH STABILITY: PHYSICAL HEALTH: EXERCISE ACTIVITY: KNEE FLEXION

## 2024-06-19 ASSESSMENT — ENCOUNTER SYMPTOMS
HYPERTENSION: 1
PAIN LOCATION - PAIN QUALITY: ACHING
LOWEST PAIN SEVERITY IN PAST 24 HOURS: 3/10
PAIN LOCATION - PAIN FREQUENCY: FREQUENT
PERSON REPORTING PAIN: PATIENT
PAIN LOCATION - PAIN SEVERITY: 3/10
OCCASIONAL FEELINGS OF UNSTEADINESS: 0
PAIN: 1
PAIN SEVERITY GOAL: 0/10
HIGHEST PAIN SEVERITY IN PAST 24 HOURS: 7/10
PAIN LOCATION: LEFT HIP

## 2024-06-19 ASSESSMENT — ACTIVITIES OF DAILY LIVING (ADL)
AMBULATION ASSISTANCE ON FLAT SURFACES: 1
ENTERING_EXITING_HOME: CONTACT GUARD ASSIST
AMBULATION_DISTANCE/DURATION_TOLERATED: INSTRUCTED AND DEMONSTRATED SBA GAIT TRAINING WITH FRONT WHEELED WALKER APPROX 80FT INDOORS
OASIS_M1830: 03

## 2024-06-20 ENCOUNTER — HOME CARE VISIT (OUTPATIENT)
Dept: HOME HEALTH SERVICES | Facility: HOME HEALTH | Age: 64
End: 2024-06-20
Payer: COMMERCIAL

## 2024-06-20 ENCOUNTER — DOCUMENTATION (OUTPATIENT)
Dept: ORTHOPEDICS | Facility: HOSPITAL | Age: 64
End: 2024-06-20
Payer: COMMERCIAL

## 2024-06-20 PROCEDURE — G0157 HHC PT ASSISTANT EA 15: HCPCS | Mod: CQ

## 2024-06-20 PROCEDURE — G0152 HHCP-SERV OF OT,EA 15 MIN: HCPCS | Performed by: OCCUPATIONAL THERAPIST

## 2024-06-20 ASSESSMENT — ENCOUNTER SYMPTOMS
LOWEST PAIN SEVERITY IN PAST 24 HOURS: 4/10
PAIN LOCATION: LEFT HIP
HIGHEST PAIN SEVERITY IN PAST 24 HOURS: 7/10
PAIN: 1
SUBJECTIVE PAIN PROGRESSION: GRADUALLY IMPROVING
PERSON REPORTING PAIN: PATIENT
PAIN SEVERITY GOAL: 1/10

## 2024-06-20 ASSESSMENT — ACTIVITIES OF DAILY LIVING (ADL)
TOILETING: SUPERVISION
DRESSING_LB_CURRENT_FUNCTION: SUPERVISION
DRESSING_UB_CURRENT_FUNCTION: INDEPENDENT
PHYSICAL TRANSFERS ASSESSED: 1
PREPARING MEALS: NEEDS ASSISTANCE
BATHING_CURRENT_FUNCTION: SUPERVISION
AMBULATION ASSISTANCE: 1
TOILETING: 1
CURRENT_FUNCTION: SUPERVISION
BATHING ASSESSED: 1
AMBULATION ASSISTANCE: STAND BY ASSIST

## 2024-06-20 ASSESSMENT — PAIN DESCRIPTION - PAIN TYPE: TYPE: ACUTE PAIN;SURGICAL PAIN

## 2024-06-20 NOTE — DISCHARGE SUMMARY
Discharge summary  This patient Luis Alberto Lal was admitted to the hospital on 6/17/2024  after undergoing Procedure(s) (LRB):  Total Hip Arthroplasty (Left) without complications that morning.    During the postoperative period,while in hospital, patient was medically managed by the hospitalist. Please see medial notes and H&P for patients additional diagnoses.  Ortho agrees with all medical diagnoses and treatments while patient in hospital.  No significant or unexpected findings or abnormal ortho imaging were noted during the hospital stay    Hospital course      Patient tolerated surgical procedure well and there was no complications. Patient progressed adequately through their recovery during hospital stay including PT and rehabilitation.    Patient was then D/C on 6/18/2024 to home  in stable condition.  Patient was instructed on the use of pain medications, the signs and symptoms of infection, and was given our number to call should they have any questions or concerns following discharge.    Based on my clinical judgment, the patient was provided with a 7-day prescription for opioid medication at 30 MED, indicated for treatment of acute pain in the setting of recent surgery. OARRS report was run and has demonstrated an appropriate time course.  The patient has been provided with counseling pertaining to safe use of opioid medication.      Patient may WBAT with posterior hip precautions to operative extremity with use of walker for assistance with ambulation   Mepilex dressing to be removed POD#7 and incision left open to air  Aspirin 81 mg BID for DVT prophylaxis started on 6/18/24 and to be taken for 30 days  Follow up with surgeon in 2 weeks

## 2024-06-24 ENCOUNTER — HOME CARE VISIT (OUTPATIENT)
Dept: HOME HEALTH SERVICES | Facility: HOME HEALTH | Age: 64
End: 2024-06-24
Payer: COMMERCIAL

## 2024-06-24 PROCEDURE — G0157 HHC PT ASSISTANT EA 15: HCPCS | Mod: CQ

## 2024-06-24 ASSESSMENT — PAIN DESCRIPTION - PAIN TYPE: TYPE: ACUTE PAIN;SURGICAL PAIN

## 2024-06-26 ENCOUNTER — HOME CARE VISIT (OUTPATIENT)
Dept: HOME HEALTH SERVICES | Facility: HOME HEALTH | Age: 64
End: 2024-06-26
Payer: COMMERCIAL

## 2024-06-26 PROCEDURE — G0157 HHC PT ASSISTANT EA 15: HCPCS | Mod: CQ

## 2024-06-26 ASSESSMENT — PAIN DESCRIPTION - PAIN TYPE: TYPE: ACUTE PAIN;SURGICAL PAIN

## 2024-06-28 ENCOUNTER — LAB (OUTPATIENT)
Dept: LAB | Facility: LAB | Age: 64
End: 2024-06-28
Payer: COMMERCIAL

## 2024-06-28 DIAGNOSIS — G89.29 CHRONIC HIP PAIN, LEFT: ICD-10-CM

## 2024-06-28 DIAGNOSIS — Z11.59 ENCOUNTER FOR HEPATITIS C SCREENING TEST FOR LOW RISK PATIENT: ICD-10-CM

## 2024-06-28 DIAGNOSIS — M16.12 UNILATERAL PRIMARY OSTEOARTHRITIS, LEFT HIP: ICD-10-CM

## 2024-06-28 DIAGNOSIS — M16.12 PRIMARY OSTEOARTHRITIS OF LEFT HIP: ICD-10-CM

## 2024-06-28 DIAGNOSIS — M25.552 CHRONIC HIP PAIN, LEFT: ICD-10-CM

## 2024-06-28 LAB
ALBUMIN SERPL BCP-MCNC: 4 G/DL (ref 3.4–5)
ALP SERPL-CCNC: 85 U/L (ref 33–136)
ALT SERPL W P-5'-P-CCNC: 28 U/L (ref 10–52)
ANION GAP SERPL CALC-SCNC: 12 MMOL/L (ref 10–20)
AST SERPL W P-5'-P-CCNC: 20 U/L (ref 9–39)
BASOPHILS # BLD AUTO: 0.09 X10*3/UL (ref 0–0.1)
BASOPHILS NFR BLD AUTO: 1.1 %
BILIRUB SERPL-MCNC: 0.7 MG/DL (ref 0–1.2)
BUN SERPL-MCNC: 20 MG/DL (ref 6–23)
CALCIUM SERPL-MCNC: 9.6 MG/DL (ref 8.6–10.3)
CHLORIDE SERPL-SCNC: 101 MMOL/L (ref 98–107)
CO2 SERPL-SCNC: 30 MMOL/L (ref 21–32)
CREAT SERPL-MCNC: 0.76 MG/DL (ref 0.5–1.3)
EGFRCR SERPLBLD CKD-EPI 2021: >90 ML/MIN/1.73M*2
EOSINOPHIL # BLD AUTO: 0.36 X10*3/UL (ref 0–0.7)
EOSINOPHIL NFR BLD AUTO: 4.3 %
ERYTHROCYTE [DISTWIDTH] IN BLOOD BY AUTOMATED COUNT: 12.4 % (ref 11.5–14.5)
GLUCOSE SERPL-MCNC: 89 MG/DL (ref 74–99)
HCT VFR BLD AUTO: 43.3 % (ref 41–52)
HCV AB SER QL: NONREACTIVE
HGB BLD-MCNC: 13.8 G/DL (ref 13.5–17.5)
IMM GRANULOCYTES # BLD AUTO: 0.13 X10*3/UL (ref 0–0.7)
IMM GRANULOCYTES NFR BLD AUTO: 1.6 % (ref 0–0.9)
IRON SATN MFR SERPL: 17 % (ref 25–45)
IRON SERPL-MCNC: 63 UG/DL (ref 35–150)
LYMPHOCYTES # BLD AUTO: 2.04 X10*3/UL (ref 1.2–4.8)
LYMPHOCYTES NFR BLD AUTO: 24.4 %
MCH RBC QN AUTO: 28.3 PG (ref 26–34)
MCHC RBC AUTO-ENTMCNC: 31.9 G/DL (ref 32–36)
MCV RBC AUTO: 89 FL (ref 80–100)
MONOCYTES # BLD AUTO: 0.71 X10*3/UL (ref 0.1–1)
MONOCYTES NFR BLD AUTO: 8.5 %
NEUTROPHILS # BLD AUTO: 5.04 X10*3/UL (ref 1.2–7.7)
NEUTROPHILS NFR BLD AUTO: 60.1 %
NRBC BLD-RTO: 0 /100 WBCS (ref 0–0)
PLATELET # BLD AUTO: 465 X10*3/UL (ref 150–450)
POTASSIUM SERPL-SCNC: 4.5 MMOL/L (ref 3.5–5.3)
PROT SERPL-MCNC: 7.1 G/DL (ref 6.4–8.2)
RBC # BLD AUTO: 4.87 X10*6/UL (ref 4.5–5.9)
SODIUM SERPL-SCNC: 138 MMOL/L (ref 136–145)
TIBC SERPL-MCNC: 367 UG/DL (ref 240–445)
UIBC SERPL-MCNC: 304 UG/DL (ref 110–370)
WBC # BLD AUTO: 8.4 X10*3/UL (ref 4.4–11.3)

## 2024-06-28 PROCEDURE — 83550 IRON BINDING TEST: CPT

## 2024-06-28 PROCEDURE — 83540 ASSAY OF IRON: CPT

## 2024-06-28 PROCEDURE — 86803 HEPATITIS C AB TEST: CPT

## 2024-06-28 PROCEDURE — 80053 COMPREHEN METABOLIC PANEL: CPT

## 2024-06-28 PROCEDURE — 85025 COMPLETE CBC W/AUTO DIFF WBC: CPT

## 2024-06-28 PROCEDURE — 36415 COLL VENOUS BLD VENIPUNCTURE: CPT

## 2024-07-01 ENCOUNTER — HOME CARE VISIT (OUTPATIENT)
Dept: HOME HEALTH SERVICES | Facility: HOME HEALTH | Age: 64
End: 2024-07-01
Payer: COMMERCIAL

## 2024-07-01 ENCOUNTER — APPOINTMENT (OUTPATIENT)
Dept: PRIMARY CARE | Facility: CLINIC | Age: 64
End: 2024-07-01
Payer: COMMERCIAL

## 2024-07-01 VITALS
TEMPERATURE: 97.2 F | RESPIRATION RATE: 12 BRPM | WEIGHT: 173 LBS | HEIGHT: 66 IN | OXYGEN SATURATION: 98 % | HEART RATE: 60 BPM | SYSTOLIC BLOOD PRESSURE: 114 MMHG | DIASTOLIC BLOOD PRESSURE: 70 MMHG | BODY MASS INDEX: 27.8 KG/M2

## 2024-07-01 VITALS — TEMPERATURE: 97.7 F

## 2024-07-01 DIAGNOSIS — Z12.11 ENCOUNTER FOR SCREENING FOR MALIGNANT NEOPLASM OF COLON: ICD-10-CM

## 2024-07-01 DIAGNOSIS — M25.552 CHRONIC HIP PAIN, LEFT: ICD-10-CM

## 2024-07-01 DIAGNOSIS — G89.29 CHRONIC HIP PAIN, LEFT: ICD-10-CM

## 2024-07-01 DIAGNOSIS — M16.12 PRIMARY OSTEOARTHRITIS OF LEFT HIP: ICD-10-CM

## 2024-07-01 DIAGNOSIS — Z00.00 ROUTINE GENERAL MEDICAL EXAMINATION AT A HEALTH CARE FACILITY: Primary | ICD-10-CM

## 2024-07-01 DIAGNOSIS — I10 ESSENTIAL (PRIMARY) HYPERTENSION: ICD-10-CM

## 2024-07-01 DIAGNOSIS — Z12.5 SPECIAL SCREENING EXAMINATION FOR NEOPLASM OF PROSTATE: ICD-10-CM

## 2024-07-01 PROCEDURE — 99396 PREV VISIT EST AGE 40-64: CPT | Performed by: FAMILY MEDICINE

## 2024-07-01 PROCEDURE — 3074F SYST BP LT 130 MM HG: CPT | Performed by: FAMILY MEDICINE

## 2024-07-01 PROCEDURE — G0151 HHCP-SERV OF PT,EA 15 MIN: HCPCS

## 2024-07-01 PROCEDURE — 1036F TOBACCO NON-USER: CPT | Performed by: FAMILY MEDICINE

## 2024-07-01 PROCEDURE — 3078F DIAST BP <80 MM HG: CPT | Performed by: FAMILY MEDICINE

## 2024-07-01 RX ORDER — AMLODIPINE BESYLATE 10 MG/1
10 TABLET ORAL DAILY
Qty: 90 TABLET | Refills: 3 | Status: SHIPPED | OUTPATIENT
Start: 2024-07-01 | End: 2025-07-01

## 2024-07-01 RX ORDER — METOPROLOL SUCCINATE 25 MG/1
25 TABLET, EXTENDED RELEASE ORAL DAILY
Qty: 90 TABLET | Refills: 3 | Status: SHIPPED | OUTPATIENT
Start: 2024-07-01 | End: 2025-07-01

## 2024-07-01 RX ORDER — LOSARTAN POTASSIUM 100 MG/1
100 TABLET ORAL DAILY
Qty: 90 TABLET | Refills: 3 | Status: SHIPPED | OUTPATIENT
Start: 2024-07-01 | End: 2025-07-01

## 2024-07-01 SDOH — HEALTH STABILITY: PHYSICAL HEALTH: EXERCISE ACTIVITY: HIP EXTENSION

## 2024-07-01 SDOH — HEALTH STABILITY: PHYSICAL HEALTH: EXERCISE ACTIVITY: HEEL RAISES

## 2024-07-01 SDOH — HEALTH STABILITY: PHYSICAL HEALTH: PHYSICAL EXERCISE: STANDING

## 2024-07-01 SDOH — HEALTH STABILITY: PHYSICAL HEALTH: EXERCISE ACTIVITY: HIP ABDUCTION

## 2024-07-01 SDOH — HEALTH STABILITY: PHYSICAL HEALTH: EXERCISE ACTIVITY: HIP FLEXION

## 2024-07-01 SDOH — HEALTH STABILITY: PHYSICAL HEALTH: EXERCISE TYPE: INSTRUCTED AND DEMONSTRATED INDEP STANDING THER EX PROGRAM AT KITCHEN SINK

## 2024-07-01 SDOH — HEALTH STABILITY: PHYSICAL HEALTH: EXERCISE ACTIVITY: KNEE FLEXION

## 2024-07-01 SDOH — HEALTH STABILITY: PHYSICAL HEALTH: EXERCISE ACTIVITY: MINI SQUATS

## 2024-07-01 ASSESSMENT — ENCOUNTER SYMPTOMS
PAIN SEVERITY GOAL: 0/10
TREMORS: 0
SORE THROAT: 0
BRUISES/BLEEDS EASILY: 0
NERVOUS/ANXIOUS: 0
CONSTIPATION: 0
PHOTOPHOBIA: 0
HEMATURIA: 0
SHORTNESS OF BREATH: 0
HALLUCINATIONS: 0
DIAPHORESIS: 0
CHOKING: 0
NUMBNESS: 0
SLEEP DISTURBANCE: 0
APPETITE CHANGE: 0
AGITATION: 0
ARTHRALGIAS: 0
PERSON REPORTING PAIN: PATIENT
VOICE CHANGE: 0
NECK STIFFNESS: 0
OCCASIONAL FEELINGS OF UNSTEADINESS: 0
FACIAL ASYMMETRY: 0
CHEST TIGHTNESS: 0
SEIZURES: 0
DIARRHEA: 0
UNEXPECTED WEIGHT CHANGE: 0
FLANK PAIN: 0
EYE REDNESS: 0
NECK PAIN: 0
PAIN LOCATION - PAIN SEVERITY: 3/10
ACTIVITY CHANGE: 0
FEVER: 0
NAUSEA: 0
DYSURIA: 0
MYALGIAS: 0
SPEECH DIFFICULTY: 0
SINUS PRESSURE: 0
WEAKNESS: 0
RHINORRHEA: 0
EYE DISCHARGE: 0
EYE PAIN: 0
WHEEZING: 0
POLYDIPSIA: 0
TROUBLE SWALLOWING: 0
HIGHEST PAIN SEVERITY IN PAST 24 HOURS: 4/10
FATIGUE: 0
COUGH: 0
ABDOMINAL DISTENTION: 0
VOMITING: 0
JOINT SWELLING: 0
PALPITATIONS: 0
ABDOMINAL PAIN: 0
FREQUENCY: 0
EYE ITCHING: 0
PAIN LOCATION: LEFT HIP
DIZZINESS: 0
BACK PAIN: 0
PAIN LOCATION - PAIN FREQUENCY: WITH ACTIVITY
LOWEST PAIN SEVERITY IN PAST 24 HOURS: 0/10
ADENOPATHY: 0
CHILLS: 0
BLOOD IN STOOL: 0
HEADACHES: 0
LIGHT-HEADEDNESS: 0
PAIN LOCATION - PAIN QUALITY: ACHING
SUBJECTIVE PAIN PROGRESSION: UNCHANGED
HYPERTENSION: 1
PAIN: 1
DYSPHORIC MOOD: 0
CONFUSION: 0
WOUND: 0

## 2024-07-01 ASSESSMENT — ACTIVITIES OF DAILY LIVING (ADL)
AMBULATION_DISTANCE/DURATION_TOLERATED: INSTRUCTED AND DEMONSTRATED INDEP GAIT TRAINING WITHOUT DEVICE OVER 150FT
HOME_HEALTH_OASIS: 00
AMBULATION ASSISTANCE ON FLAT SURFACES: 1
OASIS_M1830: 01

## 2024-07-01 NOTE — PROGRESS NOTES
Subjective   Patient ID: Luis Alberto Lal is a 63 y.o. male who presents for Follow-up (To review labs ).  Subjective  Luis Alberto Lal is a 63 y.o. male and is here for a comprehensive physical exam. The patient reports status post left hip replacement overall doing well..    Do you take any herbs or supplements that were not prescribed by a doctor? no  Are you taking calcium supplements? no  Are you taking aspirin daily? no      History:  Date last PSA: 5/17/2024           Arthritis  He reports no joint swelling. Pertinent negatives include no diarrhea, dysuria, fatigue, fever or rash.   Hypertension  This is a chronic problem. The current episode started more than 1 year ago. The problem is unchanged. The problem is controlled. Pertinent negatives include no chest pain, headaches, neck pain, palpitations or shortness of breath.       Review of Systems   Constitutional:  Negative for activity change, appetite change, chills, diaphoresis, fatigue, fever and unexpected weight change.   HENT:  Negative for congestion, ear pain, hearing loss, nosebleeds, postnasal drip, rhinorrhea, sinus pressure, sneezing, sore throat, tinnitus, trouble swallowing and voice change.    Eyes:  Negative for photophobia, pain, discharge, redness, itching and visual disturbance.   Respiratory:  Negative for cough, choking, chest tightness, shortness of breath and wheezing.    Cardiovascular:  Negative for chest pain, palpitations and leg swelling.   Gastrointestinal:  Negative for abdominal distention, abdominal pain, blood in stool, constipation, diarrhea, nausea and vomiting.   Endocrine: Negative for cold intolerance, heat intolerance, polydipsia and polyuria.   Genitourinary:  Negative for dysuria, flank pain, frequency, hematuria and urgency.   Musculoskeletal:  Positive for arthritis. Negative for arthralgias, back pain, joint swelling, myalgias, neck pain and neck stiffness.   Skin:  Negative for rash and wound.   Allergic/Immunologic:  "Negative for immunocompromised state.   Neurological:  Negative for dizziness, tremors, seizures, syncope, facial asymmetry, speech difficulty, weakness, light-headedness, numbness and headaches.   Hematological:  Negative for adenopathy. Does not bruise/bleed easily.   Psychiatric/Behavioral:  Negative for agitation, behavioral problems, confusion, dysphoric mood, hallucinations, self-injury, sleep disturbance and suicidal ideas. The patient is not nervous/anxious.        Objective   /70 (BP Location: Right arm, Patient Position: Sitting, BP Cuff Size: Large adult)   Pulse 60   Temp 36.2 °C (97.2 °F) (Temporal)   Resp 12   Ht 1.676 m (5' 6\")   Wt 78.5 kg (173 lb)   SpO2 98%   BMI 27.92 kg/m²   Physical Exam  Constitutional:       General: He is not in acute distress.     Appearance: He is not ill-appearing or diaphoretic.   HENT:      Head: Normocephalic and atraumatic.      Right Ear: External ear normal.      Left Ear: External ear normal.      Nose: Nose normal. No rhinorrhea.   Eyes:      General: Lids are normal. No scleral icterus.        Right eye: No discharge.         Left eye: No discharge.      Conjunctiva/sclera: Conjunctivae normal.   Cardiovascular:      Rate and Rhythm: Normal rate and regular rhythm.      Pulses: Normal pulses.      Heart sounds: No murmur heard.  Pulmonary:      Effort: Pulmonary effort is normal. No respiratory distress.      Breath sounds: No decreased breath sounds, wheezing, rhonchi or rales.   Abdominal:      General: Bowel sounds are normal. There is no distension.      Palpations: Abdomen is soft. There is no mass.      Tenderness: There is no abdominal tenderness. There is no guarding or rebound.   Musculoskeletal:         General: No swelling, tenderness or deformity.      Cervical back: No rigidity or tenderness.      Right lower leg: No edema.      Left lower leg: No edema.   Lymphadenopathy:      Cervical: No cervical adenopathy.      Upper Body:      Right " upper body: No supraclavicular adenopathy.      Left upper body: No supraclavicular adenopathy.   Skin:     General: Skin is warm and dry.      Coloration: Skin is not jaundiced or pale.      Findings: No erythema, lesion or rash.   Neurological:      General: No focal deficit present.      Mental Status: He is alert and oriented to person, place, and time.      Sensory: No sensory deficit.      Motor: No weakness or tremor.      Coordination: Coordination normal.      Gait: Gait normal.   Psychiatric:         Mood and Affect: Mood normal. Affect is not inappropriate.         Behavior: Behavior normal.       Assessment/Plan   Problem List Items Addressed This Visit       Osteoarthritis of hip    Relevant Orders    Follow Up In [facility] - PCP - Health Maintenance     Other Visit Diagnoses       Routine general medical examination at a health care facility    -  Primary    Chronic hip pain, left        Relevant Orders    Follow Up In [facility] - [facility] - Health Maintenance    Essential (primary) hypertension        Relevant Medications    metoprolol succinate XL (Toprol-XL) 25 mg 24 hr tablet    amLODIPine (Norvasc) 10 mg tablet    losartan (Cozaar) 100 mg tablet    Other Relevant Orders    CBC and Auto Differential    Comprehensive Metabolic Panel    Lipid Panel    Magnesium    TSH with reflex to Free T4 if abnormal    Follow Up In [facility] - PCP - Health Maintenance    Encounter for screening for malignant neoplasm of colon        Relevant Orders    Follow Up In [facility] - PCP - Health Maintenance    Colonoscopy Screening; Average Risk Patient    Special screening examination for neoplasm of prostate        Relevant Orders    Follow Up In [facility] - [facility] - Health Maintenance    Prostate Specific Antigen, Screen        2. Patient Counseling:  --Nutrition: Stressed importance of moderation in sodium/caffeine intake, saturated fat and cholesterol, caloric  balance, sufficient intake of fresh fruits, vegetables, fiber, calcium, iron.  --Exercise: Stressed the importance of regular exercise.   --Substance Abuse: Discussed cessation/primary prevention of tobacco, alcohol, or other drug use; driving or other dangerous activities under the influence; availability of treatment for abuse.   --Injury prevention: Discussed safety belts, safety helmets, smoke detector, smoking near bedding or upholstery.   --Dental health: Discussed importance of regular tooth brushing, flossing, and dental visits.  --Immunizations reviewed.  --Discussed benefits of screening colonoscopy.  3. Discussed the patient's BMI with him.  The BMI is in the acceptable range.  4. Follow up in one year`

## 2024-07-02 ENCOUNTER — HOSPITAL ENCOUNTER (OUTPATIENT)
Dept: RADIOLOGY | Facility: CLINIC | Age: 64
Discharge: HOME | End: 2024-07-02
Payer: COMMERCIAL

## 2024-07-02 ENCOUNTER — OFFICE VISIT (OUTPATIENT)
Dept: ORTHOPEDIC SURGERY | Facility: CLINIC | Age: 64
End: 2024-07-02
Payer: COMMERCIAL

## 2024-07-02 DIAGNOSIS — Z96.642 AFTERCARE FOLLOWING LEFT HIP JOINT REPLACEMENT SURGERY: ICD-10-CM

## 2024-07-02 DIAGNOSIS — Z47.1 AFTERCARE FOLLOWING LEFT HIP JOINT REPLACEMENT SURGERY: Primary | ICD-10-CM

## 2024-07-02 DIAGNOSIS — Z96.642 AFTERCARE FOLLOWING LEFT HIP JOINT REPLACEMENT SURGERY: Primary | ICD-10-CM

## 2024-07-02 DIAGNOSIS — Z47.1 AFTERCARE FOLLOWING LEFT HIP JOINT REPLACEMENT SURGERY: ICD-10-CM

## 2024-07-02 PROCEDURE — 99211 OFF/OP EST MAY X REQ PHY/QHP: CPT | Performed by: PHYSICIAN ASSISTANT

## 2024-07-02 PROCEDURE — 73502 X-RAY EXAM HIP UNI 2-3 VIEWS: CPT | Mod: LT

## 2024-07-02 NOTE — PROGRESS NOTES
Subjective    Patient ID: Luis Alberto    Chief Complaint:   Chief Complaint   Patient presents with    Left Hip - Post-op Visit     LT THR 6/17/24, 2 weeks out, with x-rays today     History of present illness    63-year-old gentleman presented clinic today for his first postop follow-up visit status post left total hip replacement performed 6/17/2024.  Overall doing extremely well.  He is walking quite a bit with a cane and his dog.  He seems to have no issues.  Just taking Tylenol now for relief.  He has been able to get in the bed within the last day.  No numbness tingling no fevers chills at this time.  He is looking forward to transitioning to outpatient physical therapy which starts next week.      Past medical , Surgical, Family and social history reviewed.      Physical exam  General: No acute distress and breathing comfortably.  Patient is pleasant and cooperative with the examination.    Extremity  Left hip is neurovascular intact.  Good range of motion.  No sign of infection.  Incision is well-healed well-approximated this time.  Compartments are soft.  Capillary refill within normal is distally.  No calf swelling or tenderness.  Negative Homans' sign.  No sign of DVT.  Wound is clean dry and intact.  Improving strength.    Diagnostics  Please see dictated x-ray report  XR hip left with pelvis when performed 1 view    Result Date: 6/17/2024  Interpreted By:  Schoenberger, Joseph, STUDY: XR HIP LEFT WITH PELVIS WHEN PERFORMED 1 VIEW; ;  6/17/2024 11:30 am   INDICATION: Signs/Symptoms:Post op hip.   COMPARISON: 03/06/2024   ACCESSION NUMBER(S): [id number]   ORDERING CLINICIAN: SEB CLARK   FINDINGS: In the interval since the prior exam the patient has undergone left total hip arthroplasty. Apparent satisfactory positioning of prosthetic components. Soft tissue air in keeping recent operative status.       Expected postoperative findings.     MACRO: None   Signed by: Joseph Schoenberger 6/17/2024 2:24  PM Dictation workstation:   [id number]    CT hip left wo IV contrast    Result Date: 6/5/2024  Interpreted By:  Maria Ines Garcia, STUDY: CT HIP LEFT WO IV CONTRAST; ;  6/5/2024 9:50 am   INDICATION: Signs/Symptoms:pain.   COMPARISON: Pelvis and hip radiographs 03/06/2024.   ACCESSION NUMBER(S): [id number]   ORDERING CLINICIAN: SEB CLARK   TECHNIQUE: Axial CT images of the hips and knees were acquired in bone window. These images were performed per pre-surgical planning protocol.   FINDINGS: BONES AND JOINTS: No acute osseous changes.   There are postsurgical changes of right hip hemiarthroplasty. There is no perihardware lucency to suggest hardware complication.   Severe severe joint space narrowing of the left hip is visualized with subchondral sclerosis as well as subchondral cystic changes and osteophyte formation.   Moderate joint space narrowing of the pubic symphysis and bilateral sacroiliac joints is also visualized.   There are partially visualized multilevel degenerative changes of the lower lumbar spine with disc space narrowing, endplate osteophytosis, and multilevel facet joint hypertrophy.   There is mild patellofemoral joint space narrowing of the knees bilaterally. No knee joint effusions.   SOFT TISSUES: Please note that the study is limited in the evaluation of soft tissues due to lack of IV contrast. Evaluation of the deep pelvis is also markedly limited due to streak artifact from the patient's arthroplasty device which is detailed above   Within these limitations note is made of colonic diverticulosis without visualized evidence of diverticulitis.       Pre-surgical planning CT without evidence of acute osseous changes nor incidental soft tissue findings.   Severe left hip osteoarthrosis is visualized.   There are postsurgical changes of right hip hemiarthroplasty without evidence of hardware complication.   MACRO: None   Signed by: Maria Ines Garcia 6/5/2024 1:45 PM Dictation  workstation:   [id number]       Procedure  [ none]    Assessment  Status post left total hip replacement    Treatment plan  1.  He will continue with weightbearing activity as tolerated.  Wound instructions discussed  2.  Order for PT entered into his EMR for Monday.  He plans on doing outpatient therapy in [place].  Copy of his recent lab work was given to the patient per his request.  3.  He will follow-up with us in approximately 3 weeks for reevaluation without x-ray.  4.  All of the patient's questions were answered.    Orders Placed This Encounter    XR hip left with pelvis when performed 2 or 3 views       This note was prepared using voice recognition software.  The details of this note are correct and have been reviewed, and corrected to the best of my ability.  Some grammatical areas may persist related to the Dragon software    Bahman Hansen PA-C, [facility]  Orthopedic [place]    [phone]

## 2024-07-08 ENCOUNTER — HOSPITAL ENCOUNTER (OUTPATIENT)
Dept: PHYSICAL THERAPY | Age: 64
Setting detail: THERAPIES SERIES
Discharge: HOME | End: 2024-07-08
Payer: COMMERCIAL

## 2024-07-08 PROCEDURE — 97110 THERAPEUTIC EXERCISES: CPT

## 2024-07-08 PROCEDURE — 97162 PT EVAL MOD COMPLEX 30 MIN: CPT

## 2024-07-08 ASSESSMENT — PAIN DESCRIPTION - LOCATION: LOCATION: HIP

## 2024-07-08 ASSESSMENT — PAIN DESCRIPTION - PAIN TYPE: TYPE: SURGICAL PAIN

## 2024-07-08 ASSESSMENT — PAIN DESCRIPTION - ORIENTATION: ORIENTATION: LEFT

## 2024-07-08 ASSESSMENT — PAIN SCALES - GENERAL: PAINLEVEL_OUTOF10: 3

## 2024-07-08 NOTE — PROGRESS NOTES
Physical Therapy: Initial Evaluation    Patient: Siva Hassan (63 y.o. male)   Examination Date: 2024  Plan of Care Certification Period: 2024 to 24      :  1960 ;    Confirmed: Yes MRN: [id number]  CSN: [id number]   Insurance: Payor: [employer] / Plan: [employer] [address] / Product Type: *No Product type* /   Insurance ID: [id number] - (Commercial) Secondary Insurance (if applicable):    Referring Physician: Macho Churchill PA     PCP: Kyle Diallo DO Visits to Date/Visits Approved:     No Show/Cancelled Appts:      Medical Diagnosis: Aftercare following joint replacement surgery [Z47.1]  Presence of left artificial hip joint [Z96.642] s/p Left total hip replacement on 24  Treatment Diagnosis: s/p Right total hip replacement on 24     PERTINENT MEDICAL HISTORY   Patient Assessed for Rehabilitation Services: Yes       Medical History: Chart Reviewed: Yes   Past Medical History:   Diagnosis Date    Anxiety     Benign liver cyst 3/25/2021    Left hepatic cyst on 2021 U/S    Hypertension     Osteoarthritis      Surgical History:   Past Surgical History:   Procedure Laterality Date    CERVICAL DISC SURGERY  13    DR. PIZARRO     COLONOSCOPY  14    DR WANG    COLONOSCOPY N/A 2019    COLORECTAL CANCER SCREENING, HIGH RISK performed by Morgan Wang MD at [facility]    HERNIA REPAIR  2004    JOINT REPLACEMENT      RT    SPINE SURGERY  2016    lumbar    TOTAL HIP ARTHROPLASTY Right 2009       Medications:   Current Outpatient Medications:     gabapentin (NEURONTIN) 300 MG capsule, Take 1 capsule by mouth 2 times daily for 30 days., Disp: 60 capsule, Rfl: 2    metoprolol succinate (TOPROL XL) 50 MG extended release tablet, TAKE 1 TABLET BY MOUTH EVERY DAY, Disp: 90 tablet, Rfl: 0    amLODIPine (NORVASC) 10 MG tablet, TAKE 1 TABLET BY MOUTH EVERY DAY, Disp: 90 tablet, Rfl: 0    tadalafil (CIALIS) 10 MG tablet, Take 1 tablet

## 2024-07-11 ENCOUNTER — HOSPITAL ENCOUNTER (OUTPATIENT)
Dept: PHYSICAL THERAPY | Age: 64
Setting detail: THERAPIES SERIES
Discharge: HOME | End: 2024-07-11
Payer: COMMERCIAL

## 2024-07-11 PROCEDURE — 97110 THERAPEUTIC EXERCISES: CPT

## 2024-07-11 NOTE — PROGRESS NOTES
to focus on heel strike to toe off with increasing stride as tolerated for warm up.                          Manual Therapy: (CPT 31045) Treatment Reasoning      Manual gentle L LE hip oscilations to decrease muscle cramping with gentle STM to anterior thigh. Good response reported.                       Pt Education: Additional Comments: KT tape       ASSESSMENT     Assessment: Assessment: Reviewed current ther ex and focus on LE strength and left hip ROM with gentle stretches within precautions to reduce c/o soreness and pain. Pt. respnds well to minimal vc's for technique and form awareness. Added following standing Hip ther ex and gentle hip stretches to assist with strength and reduce onset soreness and cramping pain. CP at home.  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[facility])  Short Term Goals Completed by 1-2 weeks from date of evaluation Current Status Goal Status   Pt reports able to complete his HEP 3-5x per week       Pt reports having 3/10 or less left hip pain with increased activity                                                                           Long Term Goals Completed by 4-6 weeks from date of evaluation Current Status Goal Status   Pt reports having 0-1/10 left hip pain for 75% of his day       Increase left hip strength to 4+/5 or > so that pt can ambulate for longer periods of time with less reports of pain       Pt can ambulate >1320 feet (3 laps) in 6 min or less without AD and no increase in left hip pain post gait       Improve pts Mod LEFS from 61% on evaluation to <25% by time of DC       Pt indep with an advanced HEP to

## 2024-07-15 ENCOUNTER — HOSPITAL ENCOUNTER (OUTPATIENT)
Dept: PHYSICAL THERAPY | Age: 64
Setting detail: THERAPIES SERIES
Discharge: HOME | End: 2024-07-15
Payer: COMMERCIAL

## 2024-07-15 PROCEDURE — 97140 MANUAL THERAPY 1/> REGIONS: CPT

## 2024-07-15 PROCEDURE — 97110 THERAPEUTIC EXERCISES: CPT

## 2024-07-15 ASSESSMENT — PAIN SCALES - GENERAL: PAINLEVEL_OUTOF10: 4

## 2024-07-15 NOTE — PROGRESS NOTES
management, Home exercise program, Modalities, Therapeutic activities  Modalities: Heat/Cold, E-stim - unattended   Additional Comments: KT tape       Therapy Time  Individual Time In:       1100  Individual Time Out:  1145  Minutes:  45        Electronically signed by Em Lozano PTA  on 7/15/2024 at 11:52 AM   POC NOTE

## 2024-07-18 ENCOUNTER — HOSPITAL ENCOUNTER (OUTPATIENT)
Dept: PHYSICAL THERAPY | Age: 64
Setting detail: THERAPIES SERIES
Discharge: HOME | End: 2024-07-18
Payer: COMMERCIAL

## 2024-07-18 PROCEDURE — 97110 THERAPEUTIC EXERCISES: CPT

## 2024-07-18 NOTE — PROGRESS NOTES
Physical Therapy: Daily Note   Patient: Siva Hassan (63 y.o. male)   Examination Date: 2024  Plan of Care/Certification Expiration Date: 24    No data recorded   :  1960 # of Visits since SOC:   4   MRN: [id number]  CSN: [id number] Start of Care Date:   2024   Insurance: Payor: [employer] / Plan: [employer] [address] / Product Type: *No Product type* /   Insurance ID: [id number] - (Commercial) Secondary Insurance (if applicable):    Referring Physician: Macho Churchill PA     PCP: Kyle Diallo DO Visits to Date/Visits Approved:     No Show/Cancelled Appts: 0 / 0     Medical Diagnosis: Aftercare following joint replacement surgery [Z47.1]  Presence of left artificial hip joint [Z96.642]    Treatment Diagnosis: s/p Right total hip replacement on 24        SUBJECTIVE EXAMINATION   Pain Level: Pain Screening  Patient Currently in Pain: Yes  Pain Assessment: 0-10  Pain Level:  (3/10 L hip)    Patient Comments: Subjective: Pt states he has been going to the gym everyday.    HEP Compliance: Good         TREATMENT     Exercises:      Treatment Reasoning    Exercise 5: 8 inch step up and over leading with L ascend and R descend using assist rail for one UE support.  Exercise 6: 8 inch lateral step up and over x 10' with assist rail for one UE support.  Exercise 7: SLS 15'' at best x 3  Exercise 11: butterfly stretch; H30'' x 3  Exercise 12: bridge with abduction BTB H5'' x 15  Exercise 13: SLR x 9 H1-3''  Exercise 14: L hip flexor stretch; supine H30'' x 3 (inc tolerance)  Exercise 15: supine 90/90 hamstring stretch H30'' x 3 BLE's  Exercise 16: sidelying L hip abduction H5'' x 10  Exercise 17: prone HS curl; YTB H5''x 10  Exercise 18: prone L hip extension H5'' x 10 alternating    Limitations addressed: Mobility, Strength, Flexibility, Posture, Pain modulation, Activity tolerance                     Pt Education: Additional Comments: KT tape       ASSESSMENT     Assessment:

## 2024-07-22 ENCOUNTER — HOSPITAL ENCOUNTER (OUTPATIENT)
Dept: PHYSICAL THERAPY | Age: 64
Setting detail: THERAPIES SERIES
Discharge: HOME | End: 2024-07-22
Payer: COMMERCIAL

## 2024-07-22 PROCEDURE — 97110 THERAPEUTIC EXERCISES: CPT

## 2024-07-22 NOTE — PROGRESS NOTES
cont to progress with strenthening exs, able to hold SLR 3-5 sec 10 reps without increased pain, mild discomfort L hip flexor, good form during antigravity exs, occasional vcs for correct form especially hip abd, able to ascend and descend 8 inch step leading with surgical leg with good control with out pain. Overall great progresss, moblity and strength improving. Pain remained the same post session 2-3/10 \"achy\". 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)  Short Term Goals Completed by 1-2 weeks from date of evaluation Current Status Goal Status   Pt reports able to complete his HEP 3-5x per week       Pt reports having 3/10 or less left hip pain with increased activity                                                                           Long Term Goals Completed by 4-6 weeks from date of evaluation Current Status Goal Status   Pt reports having 0-1/10 left hip pain for 75% of his day       Increase left hip strength to 4+/5 or > so that pt can ambulate for longer periods of time with less reports of pain       Pt can ambulate >1320 feet (3 laps) in 6 min or less without AD and no increase in left hip pain post gait       Improve pts Mod LEFS from 61% on evaluation to <25% by time of DC       Pt indep with an advanced HEP to progress with pts strength and endurance so that pt can RTW at the end of August.                                                    TREATMENT PLAN   Plan Frequency: 1-2  Plan weeks: 4-6  Current Treatment Recommendations: Strengthening, Balance training, Functional mobility training, Transfer training, Neuromuscular re-education, Stair training, Gait training, Endurance training, Manual, Pain management, Home exercise program, Modalities, Therapeutic activities   Additional Comments: KT tape       Therapy

## 2024-07-25 ENCOUNTER — HOSPITAL ENCOUNTER (OUTPATIENT)
Dept: PHYSICAL THERAPY | Age: 64
Setting detail: THERAPIES SERIES
Discharge: HOME | End: 2024-07-25
Payer: COMMERCIAL

## 2024-07-25 PROCEDURE — 97110 THERAPEUTIC EXERCISES: CPT

## 2024-07-25 NOTE — PROGRESS NOTES
Physical Therapy  Daily Treatment Note  Date: 2024  Patient Name: Siva Hassan  MRN: [id number]     :   1960    Referring Physician: Macho Churchill PA     PCP: Kyle Diallo DO    Medical Diagnosis: Aftercare following joint replacement surgery [Z47.1]  Presence of left artificial hip joint [Z96.642]    Treatment Diagnosis: s/p Right total hip replacement on 24      Insurance: Payor: [employer] / Plan: [employer] [address] / Product Type: *No Product type* /   Insurance ID: [id number] - (Commercial)    Subjective:   PT Visit Information  Onset Date: 24  Total # of Visits Approved: 16  Total # of Visits to Date: 6  Plan of Care/Certification Expiration Date: 24  No Show: 0  Canceled Appointment: 0  Subjective  Subjective: Pt. states no pain currently mostly stiffness.  Max pain 3/10.  Pt. states he is working on painting his deck and states he is careful following his precautions.  Pt. states he was able to get up and down from the ground carefully.  Pain Screening  Patient Currently in Pain: No       Treatment Activities:  Exercises:      Treatment Reasoning    Exercise 11: butterfly stretch; H30'' x 2  Exercise 12: hooklying hip abd x 10 H 3-5 BTB, bridge with abduction BTB H3-5'' x 10  Exercise 13: SLR x 10 H3-5''  Exercise 14: L hip flexor stretch; supine H30'' x 3 (inc tolerance)  Exercise 15: supine 90/90 hamstring stretch H30'' x 3  Exercise 16: sidelying L hip abduction H5'' x 15  Exercise 17: prone HS curl; GTB H5''x 15  Exercise 18: prone L hip extension H5'' x 15    Limitations addressed: Mobility, Strength, Flexibility, Posture, Pain modulation, Activity tolerance                      Assessment:   Conditions Requiring Skilled Therapeutic Intervention  Body Structures, Functions, Activity Limitations Requiring Skilled Therapeutic Intervention: Decreased functional mobility ;Decreased tolerance to work activity;Decreased endurance;Decreased strength;Increased

## 2024-07-29 ENCOUNTER — APPOINTMENT (OUTPATIENT)
Dept: PHYSICAL THERAPY | Age: 64
End: 2024-07-29
Payer: COMMERCIAL

## 2024-07-30 ENCOUNTER — OFFICE VISIT (OUTPATIENT)
Dept: ORTHOPEDIC SURGERY | Facility: CLINIC | Age: 64
End: 2024-07-30
Payer: COMMERCIAL

## 2024-07-30 ENCOUNTER — HOSPITAL ENCOUNTER (OUTPATIENT)
Dept: PHYSICAL THERAPY | Age: 64
Setting detail: THERAPIES SERIES
Discharge: HOME | End: 2024-07-30
Payer: COMMERCIAL

## 2024-07-30 DIAGNOSIS — Z96.642 AFTERCARE FOLLOWING LEFT HIP JOINT REPLACEMENT SURGERY: Primary | ICD-10-CM

## 2024-07-30 DIAGNOSIS — Z47.1 AFTERCARE FOLLOWING LEFT HIP JOINT REPLACEMENT SURGERY: Primary | ICD-10-CM

## 2024-07-30 PROCEDURE — 99024 POSTOP FOLLOW-UP VISIT: CPT | Performed by: ORTHOPAEDIC SURGERY

## 2024-07-30 PROCEDURE — 97110 THERAPEUTIC EXERCISES: CPT

## 2024-07-30 PROCEDURE — 1036F TOBACCO NON-USER: CPT | Performed by: ORTHOPAEDIC SURGERY

## 2024-07-30 PROCEDURE — 99211 OFF/OP EST MAY X REQ PHY/QHP: CPT | Performed by: ORTHOPAEDIC SURGERY

## 2024-07-30 NOTE — PROGRESS NOTES
Physical Therapy  Physical Therapy: Daily Note   Patient: Siva Hassan (63 y.o. male)   Examination Date: 2024  Plan of Care/Certification Expiration Date: 24    No data recorded   :  1960 # of Visits since SOC:   7   MRN: [id number]  CSN: [id number] Start of Care Date:   2024   Insurance: Payor: [employer] / Plan: [employer] [address] / Product Type: *No Product type* /   Insurance ID: [id number] - (Commercial) Secondary Insurance (if applicable):    Referring Physician: Macho Churchill PA     PCP: Kyle Diallo DO Visits to Date/Visits Approved:     No Show/Cancelled Appts: 0 / 0     Medical Diagnosis: Aftercare following joint replacement surgery [Z47.1]  Presence of left artificial hip joint [Z96.642] s/p Left total hip replacement on 24  Treatment Diagnosis: s/p Right total hip replacement on 24        SUBJECTIVE EXAMINATION   Pain Level:      Patient Comments:      HEP Compliance: Good        OBJECTIVE EXAMINATION   Restrictions:  No data recorded No data recorded No data recorded              TREATMENT     Exercises:      Treatment Reasoning    Exercise 5: Nautilus resistance walking 30#: fwd x 5 laps, retro x 5 laps, L side step x 5 laps, R side step x 5 laps. 2 slight LOB with first rep with retro and side stepping. Pt. able to self right.  Exercise 6: 6 inch step up with 30# resistance with nautilus: fwd x 10', L side step up x 10', R side step up x 10'  Exercise 11: butterfly stretch; H30'' x 2  Exercise 14: L hip flexor stretch; supine H30'' x 2 (inc tolerance)  Exercise 15: supine 90/90 hamstring stretch H30'' x 2 twice due to c/o cramping during hamstring curls.  Exercise 16: Seated LAQ progressive 5# x 10', 8# x 10', 11# x 10'. alternating  Exercise 17: prone HS curl; GTB H3 x 12' (pt. reports mild hamstring cramping.  Exercise 18: prone L hip extension H5'' x 15    Limitations addressed: Mobility, Strength, Flexibility, Posture, Pain modulation,

## 2024-07-31 NOTE — PROGRESS NOTES
History of present illness    63-year-old gentleman here for follow-up left total hip replacement from June 17 states doing very well he is doing physical therapy noticing improved strength he is ambulating without assist device no numbness or tingling no fevers or chills no chest pain or shortness of breath.      Past medical , Surgical, Family and social history reviewed.      Physical exam  General: No acute distress and breathing comfortably.  Patient is pleasant and cooperative with the examination.    Extremity  Incision well-approximated without infection neurologically intact distally brisk cap refill compartments soft calves nontender    Diagnostics      XR hip left with pelvis when performed 2 or 3 views    Result Date: 7/9/2024  Interpreted By:  Seb West, STUDY: XR HIP LEFT WITH PELVIS WHEN PERFORMED 2 OR 3 VIEWS; 7/2/2024 1:15 pm   INDICATION: Signs/Symptoms:s/p THR.   ACCESSION NUMBER(S): [id number]   ORDERING CLINICIAN: SEB WEST   FINDINGS: Two views show patient status post total hip replacement in good alignment.  No signs of fracture dislocation or other bony abnormality       Signed by: Seb West 7/9/2024 7:40 AM Dictation workstation:   [id number]       Procedure  [ none]    Assessment  Healing total hip replacement left    Treatment plan  1.  Continue work on range of motion strengthening continue with his exercise program he is hoping to return to work the end of August she will see me in 1 month with x-ray of everything looks good we will get him to return to work at that time.  2. [   ]  3. [   ]  4.  All of the patient's questions were answered.    No orders of the defined types were placed in this encounter.      This note was prepared using voice recognition software.  The details of this note are correct and have been reviewed, and corrected to the best of my ability.  Some grammatical areas may persist related to the Dragon  software    Jake West MD  Senior Attending Physician  [facility]  Orthopedic [place]    [phone]

## 2024-08-01 ENCOUNTER — HOSPITAL ENCOUNTER (OUTPATIENT)
Dept: PHYSICAL THERAPY | Age: 64
Setting detail: THERAPIES SERIES
Discharge: HOME | End: 2024-08-01
Payer: COMMERCIAL

## 2024-08-01 PROCEDURE — 97110 THERAPEUTIC EXERCISES: CPT

## 2024-08-01 NOTE — PROGRESS NOTES
Daily Treatment Note  Date: 2024  Patient Name: Svia Hassan  MRN: [id number]     :   1960    Referring Physician: Macho Churchill PA     PCP: Kyle Diallo DO    Medical Diagnosis: Aftercare following joint replacement surgery [Z47.1]  Presence of left artificial hip joint [Z96.642]    Treatment Diagnosis: s/p Right total hip replacement on 24      Insurance: Payor: [employer] / Plan: [employer] [address] / Product Type: *No Product type* /   Insurance ID: [id number] - (Commercial)    Subjective:   PT Visit Information  Onset Date: 24  Total # of Visits Approved: 16  Total # of Visits to Date: 8  Plan of Care/Certification Expiration Date: 24  No Show: 0  Canceled Appointment: 0  Subjective  Subjective: Pt had recent F/U with doctor this week who states incision fully healed and able to return to work at week 10.       Treatment Activities:  Exercises:      Treatment Reasoning    Exercise 4: scissor bridge H3'' x 10  Exercise 5: Nautilus 3 way hip; H3'' x 10 20#  Exercise 16: seated LAQ 30# x 15 H3''  Exercise 17: Nautilus hamstring curl H3'' x 15 30#  Exercise 18: clamshells GTB H5'' x 15      Inc str, endurance                          Assessment:   Conditions Requiring Skilled Therapeutic Intervention  Body Structures, Functions, Activity Limitations Requiring Skilled Therapeutic Intervention: Decreased functional mobility ;Decreased tolerance to work activity;Decreased endurance;Decreased strength;Increased pain  Assessment: Pt able to progress with strengthening tolerating inc weight and inc Nautilus ther ex. No c/o pain post. Pt wishes to continue with therapy in order to progress with strengthening for return to work as pt has to lift heavy boxes at times and ambulates inc distances. Recommend continue with therapy 1x per week to inc str, functional mobility.  Treatment Diagnosis: s/p Right total hip replacement on 24  Therapy Prognosis: Good  Decision Making:

## 2024-08-05 ENCOUNTER — HOSPITAL ENCOUNTER (OUTPATIENT)
Dept: PHYSICAL THERAPY | Age: 64
Setting detail: THERAPIES SERIES
Discharge: HOME | End: 2024-08-05
Payer: COMMERCIAL

## 2024-08-05 PROCEDURE — 97116 GAIT TRAINING THERAPY: CPT

## 2024-08-05 PROCEDURE — 97530 THERAPEUTIC ACTIVITIES: CPT

## 2024-08-05 ASSESSMENT — PAIN DESCRIPTION - ORIENTATION: ORIENTATION: LEFT

## 2024-08-05 ASSESSMENT — PAIN DESCRIPTION - PAIN TYPE: TYPE: SURGICAL PAIN

## 2024-08-05 ASSESSMENT — PAIN SCALES - GENERAL: PAINLEVEL_OUTOF10: 2

## 2024-08-05 ASSESSMENT — PAIN DESCRIPTION - DESCRIPTORS: DESCRIPTORS: ACHING

## 2024-08-05 ASSESSMENT — PAIN DESCRIPTION - LOCATION: LOCATION: HIP

## 2024-08-05 NOTE — PROGRESS NOTES
Physical Therapy: Recheck Note   Patient: Siva Hassan (63 y.o. male)   Examination Date: 2024  Plan of Care/Certification Expiration Date: 24    Progress Note Counter: Recheck completed on 24 with plans to continue for 1-2x per week for an additonal 4-6 weeks     :  1960 # of Visits since SOC:      MRN: [id number]  CSN: [id number] Start of Care Date:   2024   Insurance: Payor: [employer] / Plan: MEDICAL [employer] [address] / Product Type: *No Product type* /   Insurance ID: [id number] - (Commercial) Secondary Insurance (if applicable):    Referring Physician: Macho Churchill PA [place]   PCP: Kyle Diallo DO Visits to Date/Visits Approved:     No Show/Cancelled Appts: 0 / 0     Medical Diagnosis: Aftercare following joint replacement surgery [Z47.1]  Presence of left artificial hip joint [Z96.642]    Treatment Diagnosis: s/p Left total hip replacement on 24        SUBJECTIVE EXAMINATION   Pain Level: Pain Screening  Patient Currently in Pain: Yes  Pain Assessment: 0-10  Pain Level: 2  Pain Type: Surgical pain  Pain Location: Hip  Pain Orientation: Left  Pain Descriptors: Aching    Patient Comments: Subjective: Pt reprots 2/10 left achy buttock pain    HEP Compliance: Good        OBJECTIVE EXAMINATION   Restrictions:  No data recorded No data recorded No data recorded          Left Strength  Right Strength         Strength LLE  L Hip Flexion: 4/5  L Hip Extension: 4/5, 4+/5  L Hip ABduction: 4/5, 4+/5  L Hip ADduction: 4/5, 4+/5  L Hip Internal Rotation: 4/5  L Hip External Rotation: 4/5  L Knee Flexion: 4/5, 4+/5  L Knee Extension: 4/5, 4+/5  L Ankle Dorsiflexion: 5/5  L Ankle Plantar Flexion: 5/5            TREATMENT     Gait: (CPT 29414)  Treatment Reasoning    Gait Training 1: 24 Pt. ambulates without AD ~1520 feet in 6 minutes with a good pace. Pt. slight tightness with decrease hip rotation initially and slightly improves as distance increases with larger step

## 2024-08-09 ENCOUNTER — APPOINTMENT (OUTPATIENT)
Dept: PHYSICAL THERAPY | Age: 64
End: 2024-08-09
Payer: COMMERCIAL

## 2024-08-13 ENCOUNTER — HOSPITAL ENCOUNTER (OUTPATIENT)
Dept: PHYSICAL THERAPY | Age: 64
Setting detail: THERAPIES SERIES
Discharge: HOME | End: 2024-08-13
Payer: COMMERCIAL

## 2024-08-13 PROCEDURE — 97140 MANUAL THERAPY 1/> REGIONS: CPT

## 2024-08-13 PROCEDURE — 97110 THERAPEUTIC EXERCISES: CPT

## 2024-08-13 NOTE — PROGRESS NOTES
Physical Therapy  Daily Treatment Note  Date: 2024  Patient Name: Siva Hassan  MRN: [id number]     :   1960    Referring Physician: Macho Churchill PA [place]   PCP: Kyle Diallo DO    Medical Diagnosis: Aftercare following joint replacement surgery [Z47.1]  Presence of left artificial hip joint [Z96.642]    Treatment Diagnosis: s/p Left total hip replacement on 24      Insurance: Payor: [employer] / Plan: [employer] [address] / Product Type: *No Product type* /   Insurance ID: [id number] - (Commercial)    Subjective:   General  Referring Provider (secondary): Gianna  PT Visit Information  Onset Date: 24  Total # of Visits Approved: 16  Total # of Visits to Date: 9  Plan of Care/Certification Expiration Date: 24  No Show: 0  Canceled Appointment: 0  Progress Note Counter: Recheck completed on 24 with plans to continue for 1-2x per week for an additonal 4-6 weeks  Referring Provider (secondary): Gianna  Subjective  Subjective: Pt. states he is not having pain.  Pt. was able to do eliptical x 1 mile.  Pain Screening  Patient Currently in Pain: No       Treatment Activities:  Exercises:      Treatment Reasoning    Exercise 1: wall slides orange ball x 15 hold 3 sec  Exercise 2: Seated Blue PB LAQ  x 20 B  Exercise 3: HS curl with legs on orange PB in supine x 5  Exercise 4: Bridge with feet on orange PB x 10  Exercise 5: Nautilus 3 way hip; H3'' x 10 20#  Exercise 6: 6 inch step up with 30# resistance with nautilus: fwd x 10', L side step up x 10', R side step up x 10'  Exercise 7: Nautilus  HS curl x 10  30# x 10 hold 3 sec  Exercise 8: March x 10  20# Nautilus                          Manual Therapy: (CPT 74762) Treatment Reasoning     Soft Tissue Mobilizaton: STM to R IT band to dec tightness post therex.                       Assessment:   Conditions Requiring Skilled Therapeutic Intervention  Body Structures, Functions, Activity Limitations Requiring Skilled

## 2024-08-15 ENCOUNTER — HOSPITAL ENCOUNTER (OUTPATIENT)
Dept: PHYSICAL THERAPY | Age: 64
Setting detail: THERAPIES SERIES
Discharge: HOME | End: 2024-08-15
Payer: COMMERCIAL

## 2024-08-15 PROCEDURE — 97530 THERAPEUTIC ACTIVITIES: CPT

## 2024-08-15 PROCEDURE — 97110 THERAPEUTIC EXERCISES: CPT

## 2024-08-15 NOTE — PROGRESS NOTES
Physical Therapy: Monthly Recheck/ Discharge Summary   Patient: Siva Hassan (63 y.o. male)   Examination Date: 08/15/2024  Plan of Care/Certification Expiration Date: 24    Progress Note Counter: Recheck completed on 24 with plans to continue for 1-2x per week for an additonal 4-6 weeks     :  1960 # of Visits since SOC:   11   MRN: [id number]  CSN: [id number] Start of Care Date:   2024   Insurance: Payor: [employer] / Plan: MEDICAL [employer] [address] / Product Type: *No Product type* /   Insurance ID: [id number] - (Commercial) Secondary Insurance (if applicable):    Referring Physician: Macho Churchill PA [place]   PCP: Kyle Diallo DO Visits to Date/Visits Approved: 10 /      No Show/Cancelled Appts: 0 / 0     Medical Diagnosis: Aftercare following joint replacement surgery [Z47.1]  Presence of left artificial hip joint [Z96.642] s/p Left total hip replacement on 24  Treatment Diagnosis: s/p Left total hip replacement on 24        SUBJECTIVE EXAMINATION   Pain Level: Pain Screening  Patient Currently in Pain: No    Patient Comments: Subjective: Pt reports overall hip feeling good, no real pain, primary complaint of stiffness. Has been working out in gym on leg machines with in precautions and feeling good, has not tried biking yet.    HEP Compliance: Good        OBJECTIVE EXAMINATION   Restrictions:  No data recorded No data recorded No data recorded        Ambulation/Gait (if applicable):   Ambulation  Surface: Level tile, Carpet  Device: No Device  Assistance: Independent  Quality of Gait: Good pace, near equal step and stride length, good heel strike bilat  Distance: amb 1320 feet in 4 min 55 sec  More Ambulation?: No    Left Strength  Right Strength         Strength LLE  L Hip Flexion: 4+/5, 5/5  L Hip Extension: 4+/5  L Hip ABduction: 4+/5  L Hip ADduction: 4+/5  L Hip Internal Rotation: 4+/5  L Hip External Rotation: 4+/5  L Knee Flexion: 4+/5, 5/5  L Knee Extension:

## 2024-08-20 ENCOUNTER — APPOINTMENT (OUTPATIENT)
Dept: PHYSICAL THERAPY | Age: 64
End: 2024-08-20
Payer: COMMERCIAL

## 2024-08-22 ENCOUNTER — APPOINTMENT (OUTPATIENT)
Dept: PHYSICAL THERAPY | Age: 64
End: 2024-08-22
Payer: COMMERCIAL

## 2024-08-27 ENCOUNTER — OFFICE VISIT (OUTPATIENT)
Dept: ORTHOPEDIC SURGERY | Facility: CLINIC | Age: 64
End: 2024-08-27
Payer: COMMERCIAL

## 2024-08-27 ENCOUNTER — HOSPITAL ENCOUNTER (OUTPATIENT)
Dept: RADIOLOGY | Facility: CLINIC | Age: 64
Discharge: HOME | End: 2024-08-27
Payer: COMMERCIAL

## 2024-08-27 DIAGNOSIS — Z47.1 AFTERCARE FOLLOWING LEFT HIP JOINT REPLACEMENT SURGERY: Primary | ICD-10-CM

## 2024-08-27 DIAGNOSIS — Z47.1 AFTERCARE FOLLOWING LEFT HIP JOINT REPLACEMENT SURGERY: ICD-10-CM

## 2024-08-27 DIAGNOSIS — Z96.642 AFTERCARE FOLLOWING LEFT HIP JOINT REPLACEMENT SURGERY: ICD-10-CM

## 2024-08-27 DIAGNOSIS — Z96.642 AFTERCARE FOLLOWING LEFT HIP JOINT REPLACEMENT SURGERY: Primary | ICD-10-CM

## 2024-08-27 PROCEDURE — 73502 X-RAY EXAM HIP UNI 2-3 VIEWS: CPT | Mod: LT

## 2024-08-27 PROCEDURE — 99024 POSTOP FOLLOW-UP VISIT: CPT | Performed by: ORTHOPAEDIC SURGERY

## 2024-08-27 PROCEDURE — 1036F TOBACCO NON-USER: CPT | Performed by: ORTHOPAEDIC SURGERY

## 2024-08-27 PROCEDURE — 99211 OFF/OP EST MAY X REQ PHY/QHP: CPT | Performed by: ORTHOPAEDIC SURGERY

## 2024-08-27 PROCEDURE — 73502 X-RAY EXAM HIP UNI 2-3 VIEWS: CPT | Mod: LEFT SIDE | Performed by: ORTHOPAEDIC SURGERY

## 2024-08-27 NOTE — LETTER
[facility]  [address] [place]  [facility] [address]  PHONE: [phone]  FAX: [phone]      August 27, 2024    Patient: Luis Alberto Lal   YOB: 1960   Date of Visit: 8/27/2024       To Whom It May Concern,    Luis Alberto Lal was seen in my clinic on 8/27/2024, and has been advised he may return to work on 9/9/2024, with no restrictions.    If you have any questions or concerns, please contact the office by phone or fax.  Phone: [phone] or Fax: [phone]        Sincerely,      Jake West MD

## 2024-08-28 NOTE — PROGRESS NOTES
History of present illness    63-year-old male here for follow-up of his left total hip replacement from June 17 states doing very well very happy with his progress back to pretty much normal activity wants to return to work on September 9      Past medical , Surgical, Family and social history reviewed.      Physical exam  General: No acute distress and breathing comfortably.  Patient is pleasant and cooperative with the examination.    Extremity  He is ambulating without assist device hip incision well-healed no sign infection good range of motion neurologically tact distally brisk cap refill compartments soft calf is nontender    Diagnostics      XR hip left with pelvis when performed 2 or 3 views    Result Date: 8/27/2024  Interpreted By:  Seb West, STUDY: XR HIP LEFT WITH PELVIS WHEN PERFORMED 2 OR 3 VIEWS; 8/27/2024 2:33 pm   INDICATION: Signs/Symptoms:pain.   ACCESSION NUMBER(S): [id number]   ORDERING CLINICIAN: SEB WEST   FINDINGS: AP pelvis and lateral view of the left hip. Patient is status post right hip resurfacing and left total hip replacement components in good position no signs of fracture dislocation or other bony abnormality     Signed by: Seb West 8/27/2024 3:32 PM Dictation workstation:   [id number]       Procedure  [ none]    Assessment  Status post total hip replacement left    Treatment plan  1.  Continue work on range of motion strengthening continue to weight-bear as tolerated patient given note to return to work 9 9 follow-up with me 3 months sooner if today he has increased pain or discomfort.  2. [   ]  3. [   ]  4.  All of the patient's questions were answered.    Orders Placed This Encounter    XR hip left with pelvis when performed 2 or 3 views       This note was prepared using voice recognition software.  The details of this note are correct and have been reviewed, and corrected to the best of my ability.  Some grammatical areas may  persist related to the Dragon software    Jake West MD  Senior Attending Physician  [facility]  Orthopedic [place]    [phone]

## 2024-10-28 DIAGNOSIS — M47.816 OSTEOARTHRITIS OF LUMBAR SPINE, UNSPECIFIED SPINAL OSTEOARTHRITIS COMPLICATION STATUS: ICD-10-CM

## 2024-10-28 RX ORDER — MELOXICAM 15 MG/1
15 TABLET ORAL DAILY
Qty: 30 TABLET | Refills: 11 | Status: SHIPPED | OUTPATIENT
Start: 2024-10-28

## 2024-11-27 ENCOUNTER — APPOINTMENT (OUTPATIENT)
Dept: ORTHOPEDIC SURGERY | Facility: CLINIC | Age: 64
End: 2024-11-27
Payer: COMMERCIAL

## 2024-12-13 ENCOUNTER — OFFICE VISIT (OUTPATIENT)
Dept: ORTHOPEDIC SURGERY | Facility: CLINIC | Age: 64
End: 2024-12-13
Payer: COMMERCIAL

## 2024-12-13 DIAGNOSIS — Z96.642 STATUS POST TOTAL HIP REPLACEMENT, LEFT: Primary | ICD-10-CM

## 2024-12-13 PROCEDURE — 99213 OFFICE O/P EST LOW 20 MIN: CPT | Performed by: PHYSICIAN ASSISTANT

## 2024-12-13 NOTE — PROGRESS NOTES
Subjective    Patient ID: Luis Alberto    Chief Complaint:   Chief Complaint   Patient presents with    Left Hip - Follow-up     LAM THR  DOS: 6/17/24 (6 months out)     History of present illness    64-year-old gentleman presented clinic today for 6-month postop follow-up status post left total hip Lam.  Overall doing extremely well.  He has no pain left hip.  He has been exercising on the elliptical and he is doing 5-minute miles now on the elliptical.  He seems very happy with the overall outcome.  He just had a vein procedure done by Dr. Munoz on the right lower extremity and this seems to be recovering well also.      Past medical , Surgical, Family and social history reviewed.      Physical exam  General: No acute distress and breathing comfortably.  Patient is pleasant and cooperative with the examination.    Extremity  Left hip is neurovascular intact.  Good range of motion.  No sign of infection.  No instability.  Great strength left lower extremity.  No groin pain    Diagnostics  [ none]  No results found.     Procedure  [ none]    Assessment  Status post left total hip Lam    Treatment plan  1.  He was encouraged to continue with weightbearing activity as tolerated including his overall exercise routine  2.  He will follow-up with us in approximately 6 months with new x-rays left hip for his 1 year postop visit.  3. [   ]  4.  All of the patient's questions were answered.    No orders of the defined types were placed in this encounter.      This note was prepared using voice recognition software.  The details of this note are correct and have been reviewed, and corrected to the best of my ability.  Some grammatical areas may persist related to the Dragon software    Bahman Hansen PA-C, [facility]  [facility]  Orthopedic [place]    [phone]

## 2024-12-23 ENCOUNTER — APPOINTMENT (OUTPATIENT)
Dept: PRIMARY CARE | Facility: CLINIC | Age: 64
End: 2024-12-23
Payer: COMMERCIAL

## 2024-12-23 VITALS
DIASTOLIC BLOOD PRESSURE: 78 MMHG | RESPIRATION RATE: 16 BRPM | HEIGHT: 66 IN | OXYGEN SATURATION: 95 % | HEART RATE: 96 BPM | BODY MASS INDEX: 28.73 KG/M2 | WEIGHT: 178.8 LBS | SYSTOLIC BLOOD PRESSURE: 118 MMHG | TEMPERATURE: 98 F

## 2024-12-23 DIAGNOSIS — I10 ESSENTIAL (PRIMARY) HYPERTENSION: ICD-10-CM

## 2024-12-23 PROCEDURE — 3008F BODY MASS INDEX DOCD: CPT | Performed by: FAMILY MEDICINE

## 2024-12-23 PROCEDURE — 3074F SYST BP LT 130 MM HG: CPT | Performed by: FAMILY MEDICINE

## 2024-12-23 PROCEDURE — 99213 OFFICE O/P EST LOW 20 MIN: CPT | Performed by: FAMILY MEDICINE

## 2024-12-23 PROCEDURE — 1036F TOBACCO NON-USER: CPT | Performed by: FAMILY MEDICINE

## 2024-12-23 PROCEDURE — 3078F DIAST BP <80 MM HG: CPT | Performed by: FAMILY MEDICINE

## 2024-12-23 RX ORDER — AMLODIPINE BESYLATE 5 MG/1
5 TABLET ORAL DAILY
Qty: 90 TABLET | Refills: 3 | Status: SHIPPED | OUTPATIENT
Start: 2024-12-23 | End: 2025-12-23

## 2024-12-23 ASSESSMENT — ENCOUNTER SYMPTOMS
ABDOMINAL PAIN: 0
SLEEP DISTURBANCE: 0
BRUISES/BLEEDS EASILY: 0
PALPITATIONS: 0
BLOOD IN STOOL: 0
WOUND: 0
VOMITING: 0
EYE ITCHING: 0
APPETITE CHANGE: 0
HYPERTENSION: 1
DYSPHORIC MOOD: 0
COUGH: 0
CHOKING: 0
SHORTNESS OF BREATH: 0
EYE REDNESS: 0
PHOTOPHOBIA: 0
TROUBLE SWALLOWING: 0
ADENOPATHY: 0
EYE DISCHARGE: 0
HALLUCINATIONS: 0
EYE PAIN: 0
CONSTIPATION: 0
WHEEZING: 0
UNEXPECTED WEIGHT CHANGE: 0
POLYDIPSIA: 0
AGITATION: 0
SINUS PRESSURE: 0
CONFUSION: 0
NERVOUS/ANXIOUS: 0
VOICE CHANGE: 0
ABDOMINAL DISTENTION: 0
NAUSEA: 0
CHEST TIGHTNESS: 0
ACTIVITY CHANGE: 0
SORE THROAT: 0
CHILLS: 0
RHINORRHEA: 0
DIAPHORESIS: 0

## 2024-12-23 NOTE — PROGRESS NOTES
"Subjective   Patient ID: Luis Alberto Lal is a 64 y.o. male who presents for Follow-up (Discuss med (amlodipine, losartan) to lower dosage.).  Hypertension  This is a chronic problem. The current episode started more than 1 year ago. The problem has been resolved since onset. The problem is controlled. Pertinent negatives include no chest pain, palpitations or shortness of breath. There are no associated agents to hypertension.       Review of Systems   Constitutional:  Negative for activity change, appetite change, chills, diaphoresis and unexpected weight change.   HENT:  Negative for congestion, ear pain, hearing loss, nosebleeds, postnasal drip, rhinorrhea, sinus pressure, sneezing, sore throat, tinnitus, trouble swallowing and voice change.    Eyes:  Negative for photophobia, pain, discharge, redness, itching and visual disturbance.   Respiratory:  Negative for cough, choking, chest tightness, shortness of breath and wheezing.    Cardiovascular:  Negative for chest pain, palpitations and leg swelling.   Gastrointestinal:  Negative for abdominal distention, abdominal pain, blood in stool, constipation, nausea and vomiting.   Endocrine: Negative for cold intolerance, heat intolerance, polydipsia and polyuria.   Musculoskeletal:  Positive for arthritis.   Skin:  Negative for wound.   Allergic/Immunologic: Negative for immunocompromised state.   Hematological:  Negative for adenopathy. Does not bruise/bleed easily.   Psychiatric/Behavioral:  Negative for agitation, behavioral problems, confusion, dysphoric mood, hallucinations, self-injury, sleep disturbance and suicidal ideas. The patient is not nervous/anxious.        Objective   /78   Pulse 96   Temp 36.7 °C (98 °F) (Temporal)   Resp 16   Ht 1.676 m (5' 6\")   Wt 81.1 kg (178 lb 12.8 oz)   SpO2 95%   BMI 28.86 kg/m²   Physical Exam  Constitutional:       General: He is not in acute distress.     Appearance: He is not ill-appearing or diaphoretic.   HENT: "      Head: Normocephalic and atraumatic.      Right Ear: External ear normal.      Left Ear: External ear normal.      Nose: Nose normal. No rhinorrhea.   Eyes:      General: Lids are normal. No scleral icterus.        Right eye: No discharge.         Left eye: No discharge.      Conjunctiva/sclera: Conjunctivae normal.   Cardiovascular:      Rate and Rhythm: Normal rate and regular rhythm.      Pulses: Normal pulses.      Heart sounds: No murmur heard.  Pulmonary:      Effort: Pulmonary effort is normal. No respiratory distress.      Breath sounds: No decreased breath sounds, wheezing, rhonchi or rales.   Abdominal:      General: Bowel sounds are normal. There is no distension.      Palpations: Abdomen is soft. There is no mass.      Tenderness: There is no abdominal tenderness. There is no guarding or rebound.   Musculoskeletal:         General: No swelling, tenderness or deformity.      Cervical back: No rigidity or tenderness.      Right lower leg: No edema.      Left lower leg: No edema.   Lymphadenopathy:      Cervical: No cervical adenopathy.      Upper Body:      Right upper body: No supraclavicular adenopathy.      Left upper body: No supraclavicular adenopathy.   Skin:     General: Skin is warm and dry.      Coloration: Skin is not jaundiced or pale.      Findings: No erythema, lesion or rash.   Neurological:      General: No focal deficit present.      Mental Status: He is alert and oriented to person, place, and time.      Sensory: No sensory deficit.      Motor: No weakness or tremor.      Coordination: Coordination normal.      Gait: Gait normal.   Psychiatric:         Mood and Affect: Mood normal. Affect is not inappropriate.         Behavior: Behavior normal.         Assessment/Plan   Problem List Items Addressed This Visit    None  Visit Diagnoses       Essential (primary) hypertension        Relevant Medications    amLODIPine (Norvasc) 5 mg tablet        Reduce amlodipine to 5 mg daily.  Continue  losartan at 100 mg and metoprolol at 25.  Keep next regular scheduled follow-up appointment in June with labs prior.  Continue to monitor blood pressure 3 times weekly.

## 2025-05-06 ENCOUNTER — TELEPHONE (OUTPATIENT)
Dept: PRIMARY CARE | Facility: CLINIC | Age: 65
End: 2025-05-06
Payer: COMMERCIAL

## 2025-05-06 NOTE — TELEPHONE ENCOUNTER
Patient called said he found some ticks on him and he now has some symptoms - joint pain, fatigue and head ache.  Patient wants to know if he should be seen or if Dr. Nowak would suggest some blood work.
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
## 2025-05-14 LAB — B BURGDOR IGG+IGM SER QL IA: <=0.9 INDEX

## 2025-05-27 ENCOUNTER — TELEPHONE (OUTPATIENT)
Dept: PRIMARY CARE | Facility: CLINIC | Age: 65
End: 2025-05-27
Payer: COMMERCIAL

## 2025-06-17 PROBLEM — Z98.41 HISTORY OF YAG LASER CAPSULOTOMY OF LENS OF RIGHT EYE: Status: RESOLVED | Noted: 2024-09-11 | Resolved: 2025-06-17

## 2025-06-17 PROBLEM — Z96.1 PSEUDOPHAKIA, RIGHT EYE: Status: RESOLVED | Noted: 2024-08-09 | Resolved: 2025-06-17

## 2025-06-17 PROBLEM — H25.12 NUCLEAR SCLEROSIS OF LEFT EYE: Status: RESOLVED | Noted: 2024-08-09 | Resolved: 2025-06-17

## 2025-06-18 ENCOUNTER — APPOINTMENT (OUTPATIENT)
Dept: ORTHOPEDIC SURGERY | Facility: CLINIC | Age: 65
End: 2025-06-18
Payer: COMMERCIAL

## 2025-06-18 ENCOUNTER — HOSPITAL ENCOUNTER (OUTPATIENT)
Dept: RADIOLOGY | Facility: HOSPITAL | Age: 65
Discharge: HOME | End: 2025-06-18
Payer: COMMERCIAL

## 2025-06-18 DIAGNOSIS — Z96.642 STATUS POST TOTAL HIP REPLACEMENT, LEFT: ICD-10-CM

## 2025-06-18 DIAGNOSIS — M16.12 PRIMARY OSTEOARTHRITIS OF LEFT HIP: ICD-10-CM

## 2025-06-18 DIAGNOSIS — M16.12 PRIMARY OSTEOARTHRITIS OF LEFT HIP: Primary | ICD-10-CM

## 2025-06-18 PROCEDURE — 73502 X-RAY EXAM HIP UNI 2-3 VIEWS: CPT | Mod: LEFT SIDE | Performed by: STUDENT IN AN ORGANIZED HEALTH CARE EDUCATION/TRAINING PROGRAM

## 2025-06-18 PROCEDURE — 73502 X-RAY EXAM HIP UNI 2-3 VIEWS: CPT | Mod: LT

## 2025-06-19 NOTE — PROGRESS NOTES
History of present illness    64-year-old male follow-up left total hip replacement 1 year out states his left hip is doing great very happy with his progress no complaints at this time.      Past medical , Surgical, Family and social history reviewed.      Physical exam  General: No acute distress and breathing comfortably.  Patient is pleasant and cooperative with the examination.    Extremity  Good range of motion of her left hip no pain with internal ex rotation neurologically intact distally brisk cap refill compartments soft calf is nontender    Diagnostics  X-rays are obtained today personally reviewed and interpreted by myself    Imaging  No results found.    Cardiology, Vascular, and Other Imaging  No other imaging results found for the past 7 days       Procedure  [ none]    Assessment  Status post left total hip replacement    Treatment plan  1.  Continue with his activity as tolerated follow-up if he has increased pain or discomfort otherwise as needed.  2. [   ]  3. [   ]  4.  All of the patient's questions were answered.    Orders Placed This Encounter    XR hip left with pelvis when performed 2 or 3 views       This note was prepared using voice recognition software.  The details of this note are correct and have been reviewed, and corrected to the best of my ability.  Some grammatical areas may persist related to the Dragon software    Jake West MD  Senior Attending Physician  [facility]  Orthopedic [place]    [phone]

## 2025-06-25 ASSESSMENT — PROMIS GLOBAL HEALTH SCALE
RATE_AVERAGE_PAIN: 5
RATE_GENERAL_HEALTH: VERY GOOD
RATE_MENTAL_HEALTH: VERY GOOD
RATE_AVERAGE_FATIGUE: MILD
RATE_QUALITY_OF_LIFE: VERY GOOD
CARRYOUT_PHYSICAL_ACTIVITIES: COMPLETELY
EMOTIONAL_PROBLEMS: RARELY
RATE_SOCIAL_SATISFACTION: VERY GOOD
CARRYOUT_SOCIAL_ACTIVITIES: VERY GOOD
RATE_PHYSICAL_HEALTH: VERY GOOD

## 2025-06-26 DIAGNOSIS — I10 ESSENTIAL (PRIMARY) HYPERTENSION: ICD-10-CM

## 2025-06-26 RX ORDER — METOPROLOL SUCCINATE 25 MG/1
25 TABLET, EXTENDED RELEASE ORAL DAILY
Qty: 90 TABLET | Refills: 0 | Status: SHIPPED | OUTPATIENT
Start: 2025-06-26

## 2025-06-26 RX ORDER — LOSARTAN POTASSIUM 100 MG/1
100 TABLET ORAL DAILY
Qty: 90 TABLET | Refills: 0 | Status: SHIPPED | OUTPATIENT
Start: 2025-06-26

## 2025-06-29 LAB
ALBUMIN SERPL-MCNC: 4.2 G/DL (ref 3.6–5.1)
ALP SERPL-CCNC: 68 U/L (ref 35–144)
ALT SERPL-CCNC: 16 U/L (ref 9–46)
ANION GAP SERPL CALCULATED.4IONS-SCNC: 8 MMOL/L (CALC) (ref 7–17)
AST SERPL-CCNC: 20 U/L (ref 10–35)
BASOPHILS # BLD AUTO: 78 CELLS/UL (ref 0–200)
BASOPHILS NFR BLD AUTO: 1.2 %
BILIRUB SERPL-MCNC: 1.8 MG/DL (ref 0.2–1.2)
BUN SERPL-MCNC: 18 MG/DL (ref 7–25)
CALCIUM SERPL-MCNC: 9 MG/DL (ref 8.6–10.3)
CHLORIDE SERPL-SCNC: 103 MMOL/L (ref 98–110)
CHOLEST SERPL-MCNC: 209 MG/DL
CHOLEST/HDLC SERPL: 3.8 (CALC)
CO2 SERPL-SCNC: 27 MMOL/L (ref 20–32)
CREAT SERPL-MCNC: 0.72 MG/DL (ref 0.7–1.35)
EGFRCR SERPLBLD CKD-EPI 2021: 103 ML/MIN/1.73M2
EOSINOPHIL # BLD AUTO: 273 CELLS/UL (ref 15–500)
EOSINOPHIL NFR BLD AUTO: 4.2 %
ERYTHROCYTE [DISTWIDTH] IN BLOOD BY AUTOMATED COUNT: 12.8 % (ref 11–15)
GLUCOSE SERPL-MCNC: 90 MG/DL (ref 65–99)
HCT VFR BLD AUTO: 47 % (ref 38.5–50)
HDLC SERPL-MCNC: 55 MG/DL
HGB BLD-MCNC: 15.3 G/DL (ref 13.2–17.1)
LDLC SERPL CALC-MCNC: 139 MG/DL (CALC)
LYMPHOCYTES # BLD AUTO: 2002 CELLS/UL (ref 850–3900)
LYMPHOCYTES NFR BLD AUTO: 30.8 %
MAGNESIUM SERPL-MCNC: 2.2 MG/DL (ref 1.5–2.5)
MCH RBC QN AUTO: 28.7 PG (ref 27–33)
MCHC RBC AUTO-ENTMCNC: 32.6 G/DL (ref 32–36)
MCV RBC AUTO: 88.2 FL (ref 80–100)
MONOCYTES # BLD AUTO: 722 CELLS/UL (ref 200–950)
MONOCYTES NFR BLD AUTO: 11.1 %
NEUTROPHILS # BLD AUTO: 3426 CELLS/UL (ref 1500–7800)
NEUTROPHILS NFR BLD AUTO: 52.7 %
NONHDLC SERPL-MCNC: 154 MG/DL (CALC)
PLATELET # BLD AUTO: 261 THOUSAND/UL (ref 140–400)
PMV BLD REES-ECKER: 8.9 FL (ref 7.5–12.5)
POTASSIUM SERPL-SCNC: 4.3 MMOL/L (ref 3.5–5.3)
PROT SERPL-MCNC: 6.8 G/DL (ref 6.1–8.1)
PSA SERPL-MCNC: 0.68 NG/ML
RBC # BLD AUTO: 5.33 MILLION/UL (ref 4.2–5.8)
SODIUM SERPL-SCNC: 138 MMOL/L (ref 135–146)
TRIGL SERPL-MCNC: 63 MG/DL
TSH SERPL-ACNC: 1.01 MIU/L (ref 0.4–4.5)
WBC # BLD AUTO: 6.5 THOUSAND/UL (ref 3.8–10.8)

## 2025-07-02 ENCOUNTER — APPOINTMENT (OUTPATIENT)
Dept: PRIMARY CARE | Facility: CLINIC | Age: 65
End: 2025-07-02
Payer: COMMERCIAL

## 2025-07-02 VITALS
HEART RATE: 62 BPM | HEIGHT: 66 IN | DIASTOLIC BLOOD PRESSURE: 68 MMHG | BODY MASS INDEX: 28.28 KG/M2 | OXYGEN SATURATION: 98 % | SYSTOLIC BLOOD PRESSURE: 116 MMHG | WEIGHT: 176 LBS

## 2025-07-02 DIAGNOSIS — I10 ESSENTIAL (PRIMARY) HYPERTENSION: ICD-10-CM

## 2025-07-02 DIAGNOSIS — Z12.11 ENCOUNTER FOR SCREENING FOR MALIGNANT NEOPLASM OF COLON: ICD-10-CM

## 2025-07-02 DIAGNOSIS — Z00.00 ROUTINE GENERAL MEDICAL EXAMINATION AT A HEALTH CARE FACILITY: Primary | ICD-10-CM

## 2025-07-02 DIAGNOSIS — Z12.5 SPECIAL SCREENING EXAMINATION FOR NEOPLASM OF PROSTATE: ICD-10-CM

## 2025-07-02 PROCEDURE — 3074F SYST BP LT 130 MM HG: CPT | Performed by: FAMILY MEDICINE

## 2025-07-02 PROCEDURE — 3008F BODY MASS INDEX DOCD: CPT | Performed by: FAMILY MEDICINE

## 2025-07-02 PROCEDURE — 99396 PREV VISIT EST AGE 40-64: CPT | Performed by: FAMILY MEDICINE

## 2025-07-02 PROCEDURE — 3078F DIAST BP <80 MM HG: CPT | Performed by: FAMILY MEDICINE

## 2025-07-02 PROCEDURE — 1036F TOBACCO NON-USER: CPT | Performed by: FAMILY MEDICINE

## 2025-07-02 RX ORDER — METOPROLOL SUCCINATE 25 MG/1
25 TABLET, EXTENDED RELEASE ORAL DAILY
Qty: 90 TABLET | Refills: 3 | Status: SHIPPED | OUTPATIENT
Start: 2025-07-02 | End: 2026-07-02

## 2025-07-02 RX ORDER — LOSARTAN POTASSIUM 100 MG/1
100 TABLET ORAL DAILY
Qty: 90 TABLET | Refills: 3 | Status: SHIPPED | OUTPATIENT
Start: 2025-07-02 | End: 2026-07-02

## 2025-07-02 RX ORDER — AMLODIPINE BESYLATE 5 MG/1
5 TABLET ORAL DAILY
Qty: 90 TABLET | Refills: 3 | Status: SHIPPED | OUTPATIENT
Start: 2025-07-02 | End: 2026-07-02

## 2025-07-02 ASSESSMENT — ENCOUNTER SYMPTOMS
ADENOPATHY: 0
FREQUENCY: 0
CONSTIPATION: 0
AGITATION: 0
SPEECH DIFFICULTY: 0
DEPRESSION: 0
CHOKING: 0
NECK STIFFNESS: 0
WOUND: 0
MYALGIAS: 0
CHILLS: 0
HEADACHES: 0
NUMBNESS: 0
ABDOMINAL DISTENTION: 0
APPETITE CHANGE: 0
RHINORRHEA: 0
HEMATURIA: 0
NECK PAIN: 0
ACTIVITY CHANGE: 0
HALLUCINATIONS: 0
PALPITATIONS: 0
CHEST TIGHTNESS: 0
CONFUSION: 0
DYSPHORIC MOOD: 0
TREMORS: 0
TROUBLE SWALLOWING: 0
BLOOD IN STOOL: 0
JOINT WARMTH: 0
EYE PAIN: 0
COUGH: 0
UNEXPECTED WEIGHT CHANGE: 0
FATIGUE: 0
EYE DISCHARGE: 0
NAUSEA: 0
DIARRHEA: 0
EYE ITCHING: 0
OCCASIONAL FEELINGS OF UNSTEADINESS: 0
PHOTOPHOBIA: 0
SHORTNESS OF BREATH: 0
BRUISES/BLEEDS EASILY: 0
EYE REDNESS: 0
FACIAL ASYMMETRY: 0
DYSURIA: 0
HYPERTENSION: 1
NERVOUS/ANXIOUS: 0
LIGHT-HEADEDNESS: 0
FEVER: 0
BACK PAIN: 0
ARTHRALGIAS: 0
ABDOMINAL PAIN: 0
SLEEP DISTURBANCE: 0
SEIZURES: 0
SINUS PRESSURE: 0
SORE THROAT: 0
JOINT SWELLING: 0
STIFFNESS: 1
LOSS OF SENSATION IN FEET: 0
DIZZINESS: 0
WEAKNESS: 0
POLYDIPSIA: 0
DIAPHORESIS: 0
VOMITING: 0
WHEEZING: 0
VOICE CHANGE: 0
FLANK PAIN: 0

## 2025-07-02 ASSESSMENT — PATIENT HEALTH QUESTIONNAIRE - PHQ9
SUM OF ALL RESPONSES TO PHQ9 QUESTIONS 1 AND 2: 0
2. FEELING DOWN, DEPRESSED OR HOPELESS: NOT AT ALL
1. LITTLE INTEREST OR PLEASURE IN DOING THINGS: NOT AT ALL

## 2025-07-02 NOTE — PROGRESS NOTES
Subjective   Patient ID: Luis Alberto Lal is a 64 y.o. male who presents for Annual Exam and Results (Pt presents for his annual exam and to go over blood work from 06/28/25.).  Subjective  Luis Alberto Lal is a 64 y.o. male and is here for a comprehensive physical exam. The patient reports no problems.    Do you take any herbs or supplements that were not prescribed by a doctor? no  Are you taking calcium supplements? no  Are you taking aspirin daily? no      History:  Date last PSA: 6/28/25    Arthritis  Presents for follow-up visit. He complains of pain and stiffness. He reports no joint swelling or joint warmth. The symptoms have been stable. Affected locations include the left knee, right knee, neck, right wrist, left wrist, right foot and left foot (back). His pain is at a severity of 4/10. Pertinent negatives include no diarrhea, dysuria, fatigue, fever or rash.   Hypertension  This is a chronic problem. The current episode started more than 1 year ago. The problem is unchanged. The problem is controlled. Pertinent negatives include no chest pain, headaches, neck pain, palpitations or shortness of breath.       Review of Systems   Constitutional:  Negative for activity change, appetite change, chills, diaphoresis, fatigue, fever and unexpected weight change.   HENT:  Negative for congestion, ear pain, hearing loss, nosebleeds, postnasal drip, rhinorrhea, sinus pressure, sneezing, sore throat, tinnitus, trouble swallowing and voice change.    Eyes:  Negative for photophobia, pain, discharge, redness, itching and visual disturbance.   Respiratory:  Negative for cough, choking, chest tightness, shortness of breath and wheezing.    Cardiovascular:  Negative for chest pain, palpitations and leg swelling.   Gastrointestinal:  Negative for abdominal distention, abdominal pain, blood in stool, constipation, diarrhea, nausea and vomiting.   Endocrine: Negative for cold intolerance, heat intolerance, polydipsia and polyuria.  "  Genitourinary:  Negative for dysuria, flank pain, frequency, hematuria and urgency.   Musculoskeletal:  Positive for stiffness. Negative for arthralgias, back pain, joint swelling, myalgias, neck pain and neck stiffness.   Skin:  Negative for rash and wound.   Allergic/Immunologic: Negative for immunocompromised state.   Neurological:  Negative for dizziness, tremors, seizures, syncope, facial asymmetry, speech difficulty, weakness, light-headedness, numbness and headaches.   Hematological:  Negative for adenopathy. Does not bruise/bleed easily.   Psychiatric/Behavioral:  Negative for agitation, behavioral problems, confusion, dysphoric mood, hallucinations, self-injury, sleep disturbance and suicidal ideas. The patient is not nervous/anxious.        Objective   /68   Pulse 62   Ht 1.676 m (5' 6\")   Wt 79.8 kg (176 lb)   SpO2 98%   BMI 28.41 kg/m²   Physical Exam  Constitutional:       General: He is not in acute distress.     Appearance: He is not ill-appearing or diaphoretic.   HENT:      Head: Normocephalic and atraumatic.      Right Ear: External ear normal.      Left Ear: External ear normal.      Nose: Nose normal. No rhinorrhea.   Eyes:      General: Lids are normal. No scleral icterus.        Right eye: No discharge.         Left eye: No discharge.      Conjunctiva/sclera: Conjunctivae normal.   Cardiovascular:      Rate and Rhythm: Normal rate and regular rhythm.      Pulses: Normal pulses.      Heart sounds: No murmur heard.  Pulmonary:      Effort: Pulmonary effort is normal. No respiratory distress.      Breath sounds: No decreased breath sounds, wheezing, rhonchi or rales.   Abdominal:      General: Bowel sounds are normal. There is no distension.      Palpations: Abdomen is soft. There is no mass.      Tenderness: There is no abdominal tenderness. There is no guarding or rebound.   Musculoskeletal:         General: No swelling, tenderness or deformity.      Cervical back: No rigidity or " tenderness.      Right lower leg: No edema.      Left lower leg: No edema.   Lymphadenopathy:      Cervical: No cervical adenopathy.      Upper Body:      Right upper body: No supraclavicular adenopathy.      Left upper body: No supraclavicular adenopathy.   Skin:     General: Skin is warm and dry.      Coloration: Skin is not jaundiced or pale.      Findings: No erythema, lesion or rash.   Neurological:      General: No focal deficit present.      Mental Status: He is alert and oriented to person, place, and time.      Sensory: No sensory deficit.      Motor: No weakness or tremor.      Coordination: Coordination normal.      Gait: Gait normal.   Psychiatric:         Mood and Affect: Mood normal. Affect is not inappropriate.         Behavior: Behavior normal.         Assessment/Plan   Problem List Items Addressed This Visit    None  Visit Diagnoses         Routine general medical examination at a health care facility    -  Primary    Relevant Orders    Follow Up In [facility] - PCP - Health Maintenance      Essential (primary) hypertension        Relevant Medications    losartan (Cozaar) 100 mg tablet    metoprolol succinate XL (Toprol-XL) 25 mg 24 hr tablet    amLODIPine (Norvasc) 5 mg tablet    Other Relevant Orders    CBC and Auto Differential    Comprehensive Metabolic Panel    Lipid Panel    Magnesium    TSH with reflex to Free T4 if abnormal    Follow Up In [facility] - PCP - Health Maintenance      Encounter for screening for malignant neoplasm of colon        Relevant Orders    Colonoscopy Screening; High Risk Patient; Prior polyps    Follow Up In [facility] - PCP - Health Maintenance      Special screening examination for neoplasm of prostate        Relevant Orders    Prostate Specific Antigen, Screen    Follow Up In [facility] - PCP - Health Maintenance           2. Patient Counseling:  --Nutrition: Stressed importance of moderation in sodium/caffeine intake,  saturated fat and cholesterol, caloric balance, sufficient intake of fresh fruits, vegetables, fiber, calcium, iron.  --Exercise: Stressed the importance of regular exercise.   --Substance Abuse: Discussed cessation/primary prevention of tobacco, alcohol, or other drug use; driving or other dangerous activities under the influence; availability of treatment for abuse.   --Injury prevention: Discussed safety belts, safety helmets, smoke detector, smoking near bedding or upholstery.   --Dental health: Discussed importance of regular tooth brushing, flossing, and dental visits.  --Immunizations reviewed.  --Discussed benefits of screening colonoscopy.  3. Discussed the patient's BMI with him.  The BMI is in the acceptable range.  4. Follow up 1 year with labs

## 2026-07-07 ENCOUNTER — APPOINTMENT (OUTPATIENT)
Dept: PRIMARY CARE | Facility: CLINIC | Age: 66
End: 2026-07-07
Payer: COMMERCIAL

## (undated) DEVICE — SUTURE, ETHILON, 4-0, BLK, MONO, PS-2 18

## (undated) DEVICE — COVER, MAYO STAND, W/PAD, 23 IN, DISPOSABLE, PLASTIC, LF, STERILE

## (undated) DEVICE — MANIFOLD, 4 PORT NEPTUNE STANDARD

## (undated) DEVICE — DRAPE, INCISE, ANTIMICROBIAL, IOBAN 2, LARGE, 17 X 23 IN, DISPOSABLE, STERILE

## (undated) DEVICE — PREP, IODOPHOR, W/ALCOHOL, DURAPREP, W/APPLICATOR, 26 CC

## (undated) DEVICE — BLADE, SAW, RECIPROCATING, SHORT

## (undated) DEVICE — Device

## (undated) DEVICE — SYRINGE, 50 CC, LUER LOCK

## (undated) DEVICE — POSITIONER, CRADLE, HEAD, MEDC, FOAM SLOTTED

## (undated) DEVICE — SUTURE, VICRYL, 1, 36 IN, V-34, VIOLET

## (undated) DEVICE — TUBING, IRRIGATION, HIGH FLOW, HAND PIECE SET

## (undated) DEVICE — DRESSING, MEPILEX BORDER, POST-OP AG, 4 X 10 IN

## (undated) DEVICE — TOWEL PACK 10-PK

## (undated) DEVICE — ADHESIVE, SKIN, LIQUIBAND EXCEED

## (undated) DEVICE — SUTURE, MONOCRYL, 3-0, PS-1 27IN, UNDYED

## (undated) DEVICE — GOWN, SURGICAL, ROYAL SILK, LG, STERILE

## (undated) DEVICE — SOLUTION, INJECTION, USP, SODIUM CHLORIDE 0.9%, .9, NACL, 1000 ML, BAG

## (undated) DEVICE — TRAY, MINOR, SINGLE BASIN, STERILE

## (undated) DEVICE — STRAP, VELCRO, BODY, 4 X 60IN, NS

## (undated) DEVICE — GLOVE, SURGICAL, PROTEXIS PI , 7.5, PF, LF

## (undated) DEVICE — RETRIEVER, SUTURE

## (undated) DEVICE — PILLOW, ABDUCTION, LARGE, 25 X 18 X 6.25 IN

## (undated) DEVICE — DRAPE KIT, RIO

## (undated) DEVICE — PROTECTOR, NERVE, ULNAR, PINK

## (undated) DEVICE — SUTURE, MONOCRYL, 3-0, 36 IN, CT-1, UNDYED

## (undated) DEVICE — WOUND SYSTEM, DEBRIDEMENT & CLEANING, O.R DUOPAK

## (undated) DEVICE — HOOD, SURGICAL, FLYTE, T7

## (undated) DEVICE — NEEDLE, SPINAL, QUINCKE, 18 G X 3.5 IN, PINK HUB

## (undated) DEVICE — BATH BLANKET STERILE

## (undated) DEVICE — SUTURE, ETHIBOND, P2, V-37, 30 IN, GREEN